# Patient Record
Sex: MALE | Race: WHITE | Employment: OTHER | URBAN - METROPOLITAN AREA
[De-identification: names, ages, dates, MRNs, and addresses within clinical notes are randomized per-mention and may not be internally consistent; named-entity substitution may affect disease eponyms.]

---

## 2017-01-04 DIAGNOSIS — E66.09 NON MORBID OBESITY DUE TO EXCESS CALORIES: ICD-10-CM

## 2017-01-04 DIAGNOSIS — E11.9 TYPE 2 DIABETES MELLITUS, CONTROLLED (HCC): ICD-10-CM

## 2017-01-04 DIAGNOSIS — E03.9 HYPOTHYROIDISM, UNSPECIFIED TYPE: ICD-10-CM

## 2017-01-04 DIAGNOSIS — Z00.00 ENCOUNTER FOR ANNUAL HEALTH EXAMINATION: ICD-10-CM

## 2017-01-04 DIAGNOSIS — I48.20 CHRONIC ATRIAL FIBRILLATION (HCC): ICD-10-CM

## 2017-01-04 DIAGNOSIS — G57.01 PIRIFORMIS SYNDROME OF RIGHT SIDE: ICD-10-CM

## 2017-01-04 DIAGNOSIS — K21.00 REFLUX ESOPHAGITIS: ICD-10-CM

## 2017-01-04 DIAGNOSIS — N40.1 BENIGN PROSTATIC HYPERPLASIA WITH LOWER URINARY TRACT SYMPTOMS, UNSPECIFIED MORPHOLOGY: ICD-10-CM

## 2017-01-04 DIAGNOSIS — Z13.31 DEPRESSION SCREENING: ICD-10-CM

## 2017-01-04 DIAGNOSIS — E78.2 MIXED HYPERLIPIDEMIA: ICD-10-CM

## 2017-01-04 DIAGNOSIS — I10 ESSENTIAL HYPERTENSION: ICD-10-CM

## 2017-01-05 RX ORDER — FINASTERIDE 5 MG/1
5 TABLET, FILM COATED ORAL
Qty: 90 TABLET | Refills: 0 | Status: SHIPPED | OUTPATIENT
Start: 2017-01-05 | End: 2017-03-28

## 2017-01-05 NOTE — TELEPHONE ENCOUNTER
From: Kenneth Kumar  To: Martina Bose MD  Sent: 1/4/2017 6:47 PM CST  Subject: Medication Renewal Request    Original authorizing provider: MD Kenneth Lara would like a refill of the following medications:  finasteride 5 MG Oral Tab [

## 2017-01-09 DIAGNOSIS — E78.2 MIXED HYPERLIPIDEMIA: ICD-10-CM

## 2017-01-09 DIAGNOSIS — I10 ESSENTIAL HYPERTENSION: ICD-10-CM

## 2017-01-09 DIAGNOSIS — E03.9 HYPOTHYROIDISM, UNSPECIFIED TYPE: Primary | ICD-10-CM

## 2017-01-09 DIAGNOSIS — E11.9 CONTROLLED TYPE 2 DIABETES MELLITUS WITHOUT COMPLICATION, WITHOUT LONG-TERM CURRENT USE OF INSULIN (HCC): ICD-10-CM

## 2017-01-11 RX ORDER — LEVOTHYROXINE SODIUM 0.07 MG/1
75 TABLET ORAL
Qty: 90 TABLET | Refills: 1 | Status: SHIPPED | OUTPATIENT
Start: 2017-01-11 | End: 2017-07-22

## 2017-01-11 RX ORDER — LOSARTAN POTASSIUM AND HYDROCHLOROTHIAZIDE 25; 100 MG/1; MG/1
1 TABLET ORAL DAILY
Qty: 90 TABLET | Refills: 1 | Status: SHIPPED | OUTPATIENT
Start: 2017-01-11 | End: 2017-07-19

## 2017-01-11 NOTE — TELEPHONE ENCOUNTER
From: Miladis Espinal  To: Brandon Riley MD  Sent: 1/9/2017 2:48 PM CST  Subject: Medication Renewal Request    Original authorizing provider: MD Miladis Day would like a refill of the following medications:  Levothyroxine Sodium 75 MCG

## 2017-01-12 NOTE — TELEPHONE ENCOUNTER
From: Josue Cisneros  To: Aggie Freitas MD  Sent: 1/12/2017 12:25 PM CST  Subject: Medication Renewal Request    Original authorizing provider: MD Josue Roblero would like a refill of the following medications:  TraMADol HCl 50 MG Oral T

## 2017-01-13 RX ORDER — TRAMADOL HYDROCHLORIDE 50 MG/1
50 TABLET ORAL 2 TIMES DAILY PRN
Qty: 30 TABLET | Refills: 5 | Status: SHIPPED | OUTPATIENT
Start: 2017-01-13 | End: 2017-01-16

## 2017-02-20 ENCOUNTER — APPOINTMENT (OUTPATIENT)
Dept: LAB | Age: 82
End: 2017-02-20
Attending: INTERNAL MEDICINE
Payer: MEDICARE

## 2017-02-20 DIAGNOSIS — E55.9 VITAMIN D DEFICIENCY: ICD-10-CM

## 2017-02-20 DIAGNOSIS — E03.9 HYPOTHYROIDISM, UNSPECIFIED TYPE: ICD-10-CM

## 2017-02-20 DIAGNOSIS — I10 ESSENTIAL HYPERTENSION: ICD-10-CM

## 2017-02-20 DIAGNOSIS — E11.9 CONTROLLED TYPE 2 DIABETES MELLITUS WITHOUT COMPLICATION, WITHOUT LONG-TERM CURRENT USE OF INSULIN (HCC): ICD-10-CM

## 2017-02-20 DIAGNOSIS — I10 ESSENTIAL HYPERTENSION WITH GOAL BLOOD PRESSURE LESS THAN 140/90: ICD-10-CM

## 2017-02-20 DIAGNOSIS — I48.20 CHRONIC ATRIAL FIBRILLATION (HCC): ICD-10-CM

## 2017-02-20 DIAGNOSIS — E78.2 MIXED HYPERLIPIDEMIA: ICD-10-CM

## 2017-02-20 LAB
ALT SERPL-CCNC: 38 U/L (ref 17–63)
AST SERPL-CCNC: 22 U/L (ref 15–41)
BUN BLD-MCNC: 20 MG/DL (ref 8–20)
CALCIUM BLD-MCNC: 9.8 MG/DL (ref 8.3–10.3)
CHLORIDE: 101 MMOL/L (ref 101–111)
CO2: 33 MMOL/L (ref 22–32)
CREAT BLD-MCNC: 0.89 MG/DL (ref 0.7–1.3)
CREAT UR-SCNC: 52.5 MG/DL
EST. AVERAGE GLUCOSE BLD GHB EST-MCNC: 157 MG/DL (ref 68–126)
GLUCOSE BLD-MCNC: 123 MG/DL (ref 70–99)
HBA1C MFR BLD HPLC: 7.1 % (ref ?–5.7)
MICROALBUMIN UR-MCNC: <0.5 MG/DL
POTASSIUM SERPL-SCNC: 4.1 MMOL/L (ref 3.6–5.1)
SODIUM SERPL-SCNC: 141 MMOL/L (ref 136–144)
TSI SER-ACNC: 3.43 MIU/ML (ref 0.35–5.5)

## 2017-02-20 PROCEDURE — 80048 BASIC METABOLIC PNL TOTAL CA: CPT

## 2017-02-20 PROCEDURE — 84443 ASSAY THYROID STIM HORMONE: CPT

## 2017-02-20 PROCEDURE — 83036 HEMOGLOBIN GLYCOSYLATED A1C: CPT

## 2017-02-20 PROCEDURE — 82043 UR ALBUMIN QUANTITATIVE: CPT

## 2017-02-20 PROCEDURE — 36415 COLL VENOUS BLD VENIPUNCTURE: CPT

## 2017-02-20 PROCEDURE — 82570 ASSAY OF URINE CREATININE: CPT

## 2017-02-20 PROCEDURE — 82306 VITAMIN D 25 HYDROXY: CPT

## 2017-02-20 PROCEDURE — 84460 ALANINE AMINO (ALT) (SGPT): CPT

## 2017-02-20 PROCEDURE — 84450 TRANSFERASE (AST) (SGOT): CPT

## 2017-02-21 LAB — 25-HYDROXYVITAMIN D (TOTAL): 35.5 NG/ML (ref 30–100)

## 2017-02-27 RX ORDER — DOCUSATE SODIUM 100 MG/1
100 CAPSULE, LIQUID FILLED ORAL 2 TIMES DAILY PRN
Qty: 60 CAPSULE | Refills: 1 | Status: SHIPPED | OUTPATIENT
Start: 2017-02-27 | End: 2018-01-15

## 2017-03-06 ENCOUNTER — TELEPHONE (OUTPATIENT)
Dept: INTERNAL MEDICINE CLINIC | Facility: CLINIC | Age: 82
End: 2017-03-06

## 2017-03-27 RX ORDER — GLIPIZIDE 5 MG/1
TABLET ORAL
Qty: 90 TABLET | Refills: 0 | Status: SHIPPED | OUTPATIENT
Start: 2017-03-27 | End: 2017-07-07

## 2017-03-28 DIAGNOSIS — I10 ESSENTIAL HYPERTENSION: ICD-10-CM

## 2017-03-28 DIAGNOSIS — E78.2 MIXED HYPERLIPIDEMIA: ICD-10-CM

## 2017-03-28 DIAGNOSIS — E66.09 NON MORBID OBESITY DUE TO EXCESS CALORIES: ICD-10-CM

## 2017-03-28 DIAGNOSIS — G57.01 PIRIFORMIS SYNDROME OF RIGHT SIDE: ICD-10-CM

## 2017-03-28 DIAGNOSIS — E03.9 HYPOTHYROIDISM, UNSPECIFIED TYPE: ICD-10-CM

## 2017-03-28 DIAGNOSIS — K21.00 REFLUX ESOPHAGITIS: ICD-10-CM

## 2017-03-28 DIAGNOSIS — I48.20 CHRONIC ATRIAL FIBRILLATION (HCC): ICD-10-CM

## 2017-03-28 DIAGNOSIS — Z13.31 DEPRESSION SCREENING: ICD-10-CM

## 2017-03-28 DIAGNOSIS — N40.1 BENIGN PROSTATIC HYPERPLASIA WITH LOWER URINARY TRACT SYMPTOMS, UNSPECIFIED MORPHOLOGY: ICD-10-CM

## 2017-03-28 DIAGNOSIS — Z00.00 ENCOUNTER FOR ANNUAL HEALTH EXAMINATION: ICD-10-CM

## 2017-03-28 DIAGNOSIS — E11.9 TYPE 2 DIABETES MELLITUS, CONTROLLED (HCC): ICD-10-CM

## 2017-03-28 RX ORDER — FINASTERIDE 5 MG/1
5 TABLET, FILM COATED ORAL
Qty: 90 TABLET | Refills: 1 | Status: SHIPPED | OUTPATIENT
Start: 2017-03-28 | End: 2017-09-25

## 2017-03-28 NOTE — TELEPHONE ENCOUNTER
From: Kenneth Kumar  To: Martina Bose MD  Sent: 3/28/2017 12:01 PM CDT  Subject: Medication Renewal Request    Original authorizing provider: MD Kenneth Lara would like a refill of the following medications:  finasteride 5 MG Oral Tab

## 2017-04-24 ENCOUNTER — OFFICE VISIT (OUTPATIENT)
Dept: INTERNAL MEDICINE CLINIC | Facility: CLINIC | Age: 82
End: 2017-04-24

## 2017-04-24 VITALS
SYSTOLIC BLOOD PRESSURE: 152 MMHG | BODY MASS INDEX: 33.45 KG/M2 | DIASTOLIC BLOOD PRESSURE: 70 MMHG | TEMPERATURE: 98 F | RESPIRATION RATE: 13 BRPM | HEART RATE: 60 BPM | HEIGHT: 69.5 IN | WEIGHT: 231 LBS | OXYGEN SATURATION: 94 %

## 2017-04-24 DIAGNOSIS — E78.2 MIXED HYPERLIPIDEMIA: ICD-10-CM

## 2017-04-24 DIAGNOSIS — I48.20 CHRONIC ATRIAL FIBRILLATION (HCC): ICD-10-CM

## 2017-04-24 DIAGNOSIS — I10 ESSENTIAL HYPERTENSION: ICD-10-CM

## 2017-04-24 DIAGNOSIS — G47.33 OSA (OBSTRUCTIVE SLEEP APNEA): ICD-10-CM

## 2017-04-24 DIAGNOSIS — E11.9 CONTROLLED TYPE 2 DIABETES MELLITUS WITHOUT COMPLICATION, WITHOUT LONG-TERM CURRENT USE OF INSULIN (HCC): ICD-10-CM

## 2017-04-24 DIAGNOSIS — Z13.31 DEPRESSION SCREENING: ICD-10-CM

## 2017-04-24 DIAGNOSIS — N40.1 BENIGN PROSTATIC HYPERPLASIA WITH LOWER URINARY TRACT SYMPTOMS, UNSPECIFIED MORPHOLOGY: ICD-10-CM

## 2017-04-24 DIAGNOSIS — M48.061 SPINAL STENOSIS OF LUMBAR REGION: ICD-10-CM

## 2017-04-24 DIAGNOSIS — E66.01 MORBID OBESITY DUE TO EXCESS CALORIES (HCC): ICD-10-CM

## 2017-04-24 DIAGNOSIS — Z00.00 ENCOUNTER FOR ANNUAL HEALTH EXAMINATION: Primary | ICD-10-CM

## 2017-04-24 DIAGNOSIS — K21.00 REFLUX ESOPHAGITIS: ICD-10-CM

## 2017-04-24 DIAGNOSIS — E03.9 HYPOTHYROIDISM, UNSPECIFIED TYPE: ICD-10-CM

## 2017-04-24 PROCEDURE — G0444 DEPRESSION SCREEN ANNUAL: HCPCS | Performed by: INTERNAL MEDICINE

## 2017-04-24 PROCEDURE — G0439 PPPS, SUBSEQ VISIT: HCPCS | Performed by: INTERNAL MEDICINE

## 2017-04-24 PROCEDURE — 99214 OFFICE O/P EST MOD 30 MIN: CPT | Performed by: INTERNAL MEDICINE

## 2017-04-24 NOTE — PROGRESS NOTES
HPI:   Geoff Lorenzo is a 80year old male who presents for a medicare wellness exam and addditional issues noted below. 1. HTN: home SBP are 140-150s over past few months.  He has also gained about 10 lbs but not sure why he gained the weight as no hypertension     Obesity     Spinal stenosis of lumbar region     Encounter for monitoring coumadin therapy     S/P lumbar spinal fusion     Piriformis syndrome of right side     Monitoring for long-term anticoagulant use     BPH (benign prostatic hyperpla (N/A, 4/27/2015); fluor gid & loclzj ndl/cath spi dx/ther njx (N/A, 4/27/2015); drain/inject large joint/bursa (N/A, 4/27/2015); fluoroscopic guidance needle placement (N/A, 4/27/2015); patient withough preoperative order for iv antibiotic surgical site in history includes Cancer in his father and mother. There is no history of Heart Disease or Stroke. SOCIAL HISTORY:   He  reports that he quit smoking about 36 years ago. His smoking use included Cigarettes. He has a 80 pack-year smoking history.  He has ne and the appearance is normal.  Bilateral monofilament/sensation of both feet is normal.  Pulsation pedal pulse exam of both lower legs/feet is normal as well.          Visual Acuity  Right Eye Visual Acuity: Corrected Right Eye Chart Acuity: 20/40   Left Ey Fair    How do you maintain positive mental well-being?: Social Interaction; Visiting Friends; Visiting Family    If you are a male age 38-65 or a female age 47-67, do you take aspirin?: No    Have you had any immunizations at another office such as Josep Stephenson progress note to see your input here.   Cognitive Assessment     What day of the week is this?: Correct    What month is it?: Correct    What year is it?: Correct    Recall \"Ball\": Correct    Recall \"Flag\": Correct    Recall \"Tree\": Correct       This or any previous visit. Tetanus No orders found for this or any previous visit.          SPECIFIC DISEASE MONITORING Internal Lab or Procedure External Lab or Procedure   Annual Monitoring of Persistent     Medications (ACE/ARB, digoxin diuretics, antico 2/2017 but his home FBS are slightly high on glipizide 5 mg once daily, metformin 500 BID. Repeat A1C in May and further titration if needed. Discussed importance of weight loss.    - no diabetic retinopathy b/l by Dr. En Lima on 3/6/2017  - Foot Exam: 4/

## 2017-04-24 NOTE — PATIENT INSTRUCTIONS
Please consider further memory evaluation through neurology and then likely neuro-psych testing. Please bring in a copy of your healthcare living will and medical power of .      If you do not hear from me in 1 week regarding your blood pressure, clutter. ? Make sure carpets and area rugs have skid proof backing. ? Do not use slippery wax on bare floors. ? Keep furniture in its accustomed place. ? If you have pets, be careful that you don’t trip over them. OUTSIDE SAFETY TIPS  ?  Always wear

## 2017-05-08 RX ORDER — OMEPRAZOLE 20 MG/1
20 CAPSULE, DELAYED RELEASE ORAL EVERY MORNING
Qty: 90 CAPSULE | Refills: 1 | Status: SHIPPED | OUTPATIENT
Start: 2017-05-08 | End: 2017-08-14

## 2017-05-08 RX ORDER — TRAMADOL HYDROCHLORIDE 50 MG/1
50 TABLET ORAL 2 TIMES DAILY PRN
Qty: 90 TABLET | Refills: 1 | Status: SHIPPED | OUTPATIENT
Start: 2017-05-08 | End: 2017-08-14

## 2017-05-08 NOTE — TELEPHONE ENCOUNTER
From: Jian Tan  To: Megan Aleman MD  Sent: 5/5/2017 10:22 PM CDT  Subject: Medication Renewal Request    Original authorizing provider: MD Jian Medina would like a refill of the following medications:  omeprazole 20 MG Oral Caps

## 2017-05-09 DIAGNOSIS — M48.00 SPINAL STENOSIS, UNSPECIFIED SPINAL REGION: Primary | ICD-10-CM

## 2017-05-09 RX ORDER — CELECOXIB 200 MG/1
200 CAPSULE ORAL DAILY
Qty: 90 CAPSULE | Refills: 1 | Status: SHIPPED | COMMUNITY
Start: 2017-05-09 | End: 2017-10-26 | Stop reason: ALTCHOICE

## 2017-05-22 RX ORDER — PRAVASTATIN SODIUM 40 MG
TABLET ORAL
Qty: 90 TABLET | Refills: 3 | Status: SHIPPED | OUTPATIENT
Start: 2017-05-22 | End: 2018-02-08

## 2017-06-16 ENCOUNTER — APPOINTMENT (OUTPATIENT)
Dept: LAB | Age: 82
End: 2017-06-16
Attending: INTERNAL MEDICINE
Payer: MEDICARE

## 2017-06-16 DIAGNOSIS — E66.9 OBESITY (BMI 30-39.9): ICD-10-CM

## 2017-06-16 DIAGNOSIS — Z13.31 DEPRESSION SCREENING: ICD-10-CM

## 2017-06-16 DIAGNOSIS — I10 ESSENTIAL HYPERTENSION: ICD-10-CM

## 2017-06-16 DIAGNOSIS — G47.33 OSA (OBSTRUCTIVE SLEEP APNEA): ICD-10-CM

## 2017-06-16 DIAGNOSIS — E03.9 HYPOTHYROIDISM, UNSPECIFIED TYPE: ICD-10-CM

## 2017-06-16 DIAGNOSIS — E11.9 CONTROLLED TYPE 2 DIABETES MELLITUS WITHOUT COMPLICATION, WITHOUT LONG-TERM CURRENT USE OF INSULIN (HCC): ICD-10-CM

## 2017-06-16 DIAGNOSIS — I48.20 CHRONIC ATRIAL FIBRILLATION (HCC): ICD-10-CM

## 2017-06-16 DIAGNOSIS — E78.2 MIXED HYPERLIPIDEMIA: ICD-10-CM

## 2017-06-16 DIAGNOSIS — Z00.00 ENCOUNTER FOR ANNUAL HEALTH EXAMINATION: ICD-10-CM

## 2017-06-16 PROCEDURE — 83036 HEMOGLOBIN GLYCOSYLATED A1C: CPT

## 2017-06-16 PROCEDURE — 80053 COMPREHEN METABOLIC PANEL: CPT

## 2017-06-16 PROCEDURE — 36415 COLL VENOUS BLD VENIPUNCTURE: CPT

## 2017-06-20 PROBLEM — E87.6 HYPOKALEMIA: Status: ACTIVE | Noted: 2017-06-20

## 2017-07-10 RX ORDER — GLIPIZIDE 5 MG/1
TABLET ORAL
Qty: 90 TABLET | Refills: 3 | Status: SHIPPED | OUTPATIENT
Start: 2017-07-10 | End: 2018-01-13

## 2017-07-19 ENCOUNTER — TELEPHONE (OUTPATIENT)
Dept: INTERNAL MEDICINE CLINIC | Facility: CLINIC | Age: 82
End: 2017-07-19

## 2017-07-19 DIAGNOSIS — E11.9 CONTROLLED TYPE 2 DIABETES MELLITUS WITHOUT COMPLICATION, WITHOUT LONG-TERM CURRENT USE OF INSULIN (HCC): ICD-10-CM

## 2017-07-19 DIAGNOSIS — E78.2 MIXED HYPERLIPIDEMIA: ICD-10-CM

## 2017-07-19 DIAGNOSIS — I10 ESSENTIAL HYPERTENSION: ICD-10-CM

## 2017-07-19 DIAGNOSIS — E03.9 HYPOTHYROIDISM, UNSPECIFIED TYPE: ICD-10-CM

## 2017-07-19 NOTE — TELEPHONE ENCOUNTER
Pt called in stating that he started potassium chloride and since then has been experiencing constipation. Thins it could be d/t medication. Informed that potassium chloride can actually cause diarrhea not constipation.  Advised to increased water, fiber

## 2017-07-21 RX ORDER — LOSARTAN POTASSIUM AND HYDROCHLOROTHIAZIDE 25; 100 MG/1; MG/1
1 TABLET ORAL DAILY
Qty: 90 TABLET | Refills: 0 | Status: SHIPPED | OUTPATIENT
Start: 2017-07-21 | End: 2017-11-14

## 2017-07-22 DIAGNOSIS — I10 ESSENTIAL HYPERTENSION: ICD-10-CM

## 2017-07-22 DIAGNOSIS — E03.9 HYPOTHYROIDISM, UNSPECIFIED TYPE: ICD-10-CM

## 2017-07-22 DIAGNOSIS — E11.9 CONTROLLED TYPE 2 DIABETES MELLITUS WITHOUT COMPLICATION, WITHOUT LONG-TERM CURRENT USE OF INSULIN (HCC): ICD-10-CM

## 2017-07-22 DIAGNOSIS — E78.2 MIXED HYPERLIPIDEMIA: ICD-10-CM

## 2017-07-24 RX ORDER — LEVOTHYROXINE SODIUM 0.07 MG/1
75 TABLET ORAL
Qty: 90 TABLET | Refills: 1 | Status: SHIPPED
Start: 2017-07-24 | End: 2018-01-16

## 2017-07-24 NOTE — TELEPHONE ENCOUNTER
From: Geoff Lorenzo  Sent: 7/22/2017 10:48 AM CDT  Subject: Medication Renewal Request    PEDRO Wells would like a refill of the following medications:  Levothyroxine Sodium 75 MCG Oral Tab Rudolph Baumgarten, MD]    Preferred pharmacy: [place] [address]

## 2017-08-03 PROBLEM — K59.00 CONSTIPATION: Status: ACTIVE | Noted: 2017-08-03

## 2017-08-14 NOTE — TELEPHONE ENCOUNTER
From: Barrera Roth  Sent: 8/14/2017 11:42 AM CDT  Subject: Medication Renewal Request    PEDRO Gutierrez would like a refill of the following medications:  TraMADol HCl 50 MG Oral Tab Karishma Reyes MD]    Preferred pharmacy: [place] [id number] -

## 2017-08-14 NOTE — TELEPHONE ENCOUNTER
From: Josue Cisneros  Sent: 8/14/2017 11:38 AM CDT  Subject: Medication Renewal Request    PEDRO Jimenez would like a refill of the following medications:  omeprazole 20 MG Oral Capsule Delayed Release Sylvia Martinez MD]    Preferred pharmacy: [facility]

## 2017-08-15 RX ORDER — TRAMADOL HYDROCHLORIDE 50 MG/1
50 TABLET ORAL 2 TIMES DAILY PRN
Qty: 90 TABLET | Refills: 1
Start: 2017-08-15 | End: 2017-09-25

## 2017-08-16 RX ORDER — TRAMADOL HYDROCHLORIDE 50 MG/1
50 TABLET ORAL 2 TIMES DAILY PRN
Qty: 90 TABLET | Refills: 1
Start: 2017-08-16

## 2017-08-16 NOTE — TELEPHONE ENCOUNTER
From: Danni Quijano  Sent: 8/15/2017 2:57 PM CDT  Subject: Medication Renewal Request    PEDRO Melitonlisa Nievess would like a refill of the following medications:  TraMADol HCl 50 MG Oral Tab Fina Leon MD]    Preferred pharmacy: [place] [id number] -

## 2017-08-19 RX ORDER — OMEPRAZOLE 20 MG/1
20 CAPSULE, DELAYED RELEASE ORAL EVERY MORNING
Qty: 90 CAPSULE | Refills: 1 | Status: SHIPPED
Start: 2017-08-19 | End: 2017-11-20

## 2017-09-21 ENCOUNTER — TELEPHONE (OUTPATIENT)
Dept: INTERNAL MEDICINE CLINIC | Facility: CLINIC | Age: 82
End: 2017-09-21

## 2017-09-21 NOTE — TELEPHONE ENCOUNTER
We do not carry the high dose flu vaccine in our clinic. He needs to get the high dose one since he is older than 72. He can get this from any other pharmacy and some [facility] also carry the high dose flu shot.

## 2017-09-25 DIAGNOSIS — K21.00 REFLUX ESOPHAGITIS: ICD-10-CM

## 2017-09-25 DIAGNOSIS — E11.9 TYPE 2 DIABETES MELLITUS, CONTROLLED (HCC): ICD-10-CM

## 2017-09-25 DIAGNOSIS — E78.2 MIXED HYPERLIPIDEMIA: ICD-10-CM

## 2017-09-25 DIAGNOSIS — E66.09 NON MORBID OBESITY DUE TO EXCESS CALORIES: ICD-10-CM

## 2017-09-25 DIAGNOSIS — I48.20 CHRONIC ATRIAL FIBRILLATION (HCC): ICD-10-CM

## 2017-09-25 DIAGNOSIS — Z00.00 ENCOUNTER FOR ANNUAL HEALTH EXAMINATION: ICD-10-CM

## 2017-09-25 DIAGNOSIS — G57.01 PIRIFORMIS SYNDROME OF RIGHT SIDE: ICD-10-CM

## 2017-09-25 DIAGNOSIS — Z13.31 DEPRESSION SCREENING: ICD-10-CM

## 2017-09-25 DIAGNOSIS — E03.9 HYPOTHYROIDISM, UNSPECIFIED TYPE: ICD-10-CM

## 2017-09-25 DIAGNOSIS — I10 ESSENTIAL HYPERTENSION: ICD-10-CM

## 2017-09-25 NOTE — TELEPHONE ENCOUNTER
From: Geoff Lorenzo  Sent: 9/25/2017 3:08 PM CDT  Subject: Medication Renewal Request    PEDRO Wells would like a refill of the following medications:     finasteride 5 MG Oral Tab Sue Pedersen MD]     TraMADol HCl 50 MG Oral Tab Sue Pedersen MD]

## 2017-09-26 RX ORDER — FINASTERIDE 5 MG/1
5 TABLET, FILM COATED ORAL
Qty: 90 TABLET | Refills: 1 | Status: SHIPPED
Start: 2017-09-26 | End: 2018-01-13

## 2017-09-27 RX ORDER — TRAMADOL HYDROCHLORIDE 50 MG/1
50 TABLET ORAL 2 TIMES DAILY PRN
Qty: 90 TABLET | Refills: 1 | Status: SHIPPED | OUTPATIENT
Start: 2017-09-27 | End: 2017-11-14

## 2017-09-27 RX ORDER — TRAMADOL HYDROCHLORIDE 50 MG/1
50 TABLET ORAL 2 TIMES DAILY PRN
Qty: 90 TABLET | Refills: 1
Start: 2017-09-27 | End: 2017-09-27

## 2017-10-29 ENCOUNTER — PATIENT MESSAGE (OUTPATIENT)
Dept: INTERNAL MEDICINE CLINIC | Facility: CLINIC | Age: 82
End: 2017-10-29

## 2017-10-30 NOTE — TELEPHONE ENCOUNTER
From: Yesenia Levy  To: Jeremy Lugo MD  Sent: 10/29/2017 11:25 AM CDT  Subject: Non-Urgent Medical Question    My Chart. Giovany Goins FIT test overdue. .I don't remember discussing this. Giovany Goins Do we need a face to face discussion?

## 2017-11-14 DIAGNOSIS — E03.9 HYPOTHYROIDISM, UNSPECIFIED TYPE: ICD-10-CM

## 2017-11-14 DIAGNOSIS — I10 ESSENTIAL HYPERTENSION: ICD-10-CM

## 2017-11-14 DIAGNOSIS — E78.2 MIXED HYPERLIPIDEMIA: ICD-10-CM

## 2017-11-14 DIAGNOSIS — E11.9 CONTROLLED TYPE 2 DIABETES MELLITUS WITHOUT COMPLICATION, WITHOUT LONG-TERM CURRENT USE OF INSULIN (HCC): ICD-10-CM

## 2017-11-15 RX ORDER — LOSARTAN POTASSIUM AND HYDROCHLOROTHIAZIDE 25; 100 MG/1; MG/1
1 TABLET ORAL DAILY
Qty: 90 TABLET | Refills: 0
Start: 2017-11-15 | End: 2017-11-16

## 2017-11-15 RX ORDER — TRAMADOL HYDROCHLORIDE 50 MG/1
50 TABLET ORAL 2 TIMES DAILY PRN
Qty: 45 TABLET | Refills: 0
Start: 2017-11-15 | End: 2017-12-03

## 2017-11-15 NOTE — TELEPHONE ENCOUNTER
From: Nadya Luna  Sent: 11/14/2017 10:06 PM CST  Subject: Medication Renewal Request    PEDRO Graham would like a refill of the following medications:     LOSARTAN POTASSIUM-HCTZ 100-25 MG Oral Tab Ruperto Zarco MD]   Patient Comment: 90 day supply

## 2017-11-16 ENCOUNTER — TELEPHONE (OUTPATIENT)
Dept: INTERNAL MEDICINE CLINIC | Facility: CLINIC | Age: 82
End: 2017-11-16

## 2017-11-16 DIAGNOSIS — E11.9 CONTROLLED TYPE 2 DIABETES MELLITUS WITHOUT COMPLICATION, WITHOUT LONG-TERM CURRENT USE OF INSULIN (HCC): ICD-10-CM

## 2017-11-16 DIAGNOSIS — I10 ESSENTIAL HYPERTENSION: ICD-10-CM

## 2017-11-16 DIAGNOSIS — E78.2 MIXED HYPERLIPIDEMIA: ICD-10-CM

## 2017-11-16 DIAGNOSIS — E03.9 HYPOTHYROIDISM, UNSPECIFIED TYPE: ICD-10-CM

## 2017-11-16 RX ORDER — LOSARTAN POTASSIUM AND HYDROCHLOROTHIAZIDE 25; 100 MG/1; MG/1
1 TABLET ORAL DAILY
Qty: 90 TABLET | Refills: 0 | Status: SHIPPED | OUTPATIENT
Start: 2017-11-16 | End: 2018-02-08

## 2017-11-22 RX ORDER — OMEPRAZOLE 20 MG/1
20 CAPSULE, DELAYED RELEASE ORAL EVERY MORNING
Qty: 90 CAPSULE | Refills: 1 | Status: SHIPPED
Start: 2017-11-22 | End: 2018-01-10

## 2017-11-22 NOTE — TELEPHONE ENCOUNTER
From: Dillon Maldonado  Sent: 11/20/2017 10:10 PM CST  Subject: Medication Renewal Request    PEDRO Benson would like a refill of the following medications:     omeprazole 20 MG Oral Capsule Delayed Release Davis Edgar MD]   Patient Comment: 90 daysupp

## 2017-12-05 RX ORDER — TRAMADOL HYDROCHLORIDE 50 MG/1
50 TABLET ORAL 2 TIMES DAILY PRN
Qty: 180 TABLET | Refills: 0
Start: 2017-12-05 | End: 2017-12-05

## 2017-12-05 RX ORDER — TRAMADOL HYDROCHLORIDE 50 MG/1
50 TABLET ORAL 2 TIMES DAILY PRN
Qty: 180 TABLET | Refills: 0 | Status: SHIPPED | OUTPATIENT
Start: 2017-12-05 | End: 2018-03-04

## 2017-12-05 NOTE — TELEPHONE ENCOUNTER
Requesting Tramadol  LOV: 04/24/17  RTC: 04/24/2018  Last Relevant Labs: 2/20/17  Filled: 11/15/17 #45 with 0 refills    Future Appointments  Date Time Provider Pam Mandujano   5/17/2018 8:30 AM [facility]

## 2017-12-05 NOTE — TELEPHONE ENCOUNTER
From: Gianna Case  Sent: 12/3/2017 9:58 PM CST  Subject: Medication Renewal Request    PEDRO Calloway would like a refill of the following medications:     TraMADol HCl 50 MG Oral Tab Boone South MD]   Patient Comment: Itake 2 pr day Qty45 MAKES NO

## 2017-12-06 ENCOUNTER — TELEPHONE (OUTPATIENT)
Dept: INTERNAL MEDICINE CLINIC | Facility: CLINIC | Age: 82
End: 2017-12-06

## 2017-12-06 NOTE — TELEPHONE ENCOUNTER
Patient left message for triage nurse, returned call but no answer or voice mail.  Tramadol script faxed to [facility] @ 11:22 am

## 2018-01-04 ENCOUNTER — OFFICE VISIT (OUTPATIENT)
Dept: SLEEP CENTER | Facility: HOSPITAL | Age: 83
End: 2018-01-04
Attending: INTERNAL MEDICINE
Payer: MEDICARE

## 2018-01-04 PROCEDURE — 95806 SLEEP STUDY UNATT&RESP EFFT: CPT

## 2018-01-05 ENCOUNTER — LAB ENCOUNTER (OUTPATIENT)
Dept: LAB | Age: 83
End: 2018-01-05
Attending: INTERNAL MEDICINE
Payer: MEDICARE

## 2018-01-05 DIAGNOSIS — E03.9 HYPOTHYROIDISM, UNSPECIFIED TYPE: ICD-10-CM

## 2018-01-05 DIAGNOSIS — I48.91 ATRIAL FIBRILLATION, UNSPECIFIED TYPE (HCC): ICD-10-CM

## 2018-01-05 DIAGNOSIS — E87.6 HYPOKALEMIA: ICD-10-CM

## 2018-01-05 DIAGNOSIS — E11.9 CONTROLLED TYPE 2 DIABETES MELLITUS WITHOUT COMPLICATION, WITHOUT LONG-TERM CURRENT USE OF INSULIN (HCC): ICD-10-CM

## 2018-01-05 DIAGNOSIS — E78.2 MIXED HYPERLIPIDEMIA: ICD-10-CM

## 2018-01-05 DIAGNOSIS — I10 ESSENTIAL HYPERTENSION: ICD-10-CM

## 2018-01-05 LAB
ALBUMIN SERPL-MCNC: 3.7 G/DL (ref 3.5–4.8)
ALP LIVER SERPL-CCNC: 71 U/L (ref 45–117)
ALT SERPL-CCNC: 36 U/L (ref 17–63)
AST SERPL-CCNC: 24 U/L (ref 15–41)
BILIRUB SERPL-MCNC: 0.4 MG/DL (ref 0.1–2)
BUN BLD-MCNC: 17 MG/DL (ref 8–20)
CALCIUM BLD-MCNC: 9.5 MG/DL (ref 8.3–10.3)
CHLORIDE: 103 MMOL/L (ref 101–111)
CHOLEST SMN-MCNC: 195 MG/DL (ref ?–200)
CO2: 29 MMOL/L (ref 22–32)
CREAT BLD-MCNC: 0.88 MG/DL (ref 0.7–1.3)
CREAT UR-SCNC: 91.6 MG/DL
EST. AVERAGE GLUCOSE BLD GHB EST-MCNC: 143 MG/DL (ref 68–126)
FREE T4: 1 NG/DL (ref 0.9–1.8)
GLUCOSE BLD-MCNC: 131 MG/DL (ref 70–99)
HBA1C MFR BLD HPLC: 6.6 % (ref ?–5.7)
HDLC SERPL-MCNC: 45 MG/DL (ref 45–?)
HDLC SERPL: 4.33 {RATIO} (ref ?–4.97)
LDLC SERPL CALC-MCNC: 104 MG/DL (ref ?–130)
M PROTEIN MFR SERPL ELPH: 7.1 G/DL (ref 6.1–8.3)
MICROALBUMIN UR-MCNC: 0.86 MG/DL
MICROALBUMIN/CREAT 24H UR-RTO: 9.4 UG/MG (ref ?–30)
NONHDLC SERPL-MCNC: 150 MG/DL (ref ?–130)
POTASSIUM SERPL-SCNC: 4.1 MMOL/L (ref 3.6–5.1)
SODIUM SERPL-SCNC: 139 MMOL/L (ref 136–144)
TRIGL SERPL-MCNC: 230 MG/DL (ref ?–150)
TSI SER-ACNC: 5.79 MIU/ML (ref 0.35–5.5)
VLDLC SERPL CALC-MCNC: 46 MG/DL (ref 5–40)

## 2018-01-05 PROCEDURE — 80053 COMPREHEN METABOLIC PANEL: CPT

## 2018-01-05 PROCEDURE — 83036 HEMOGLOBIN GLYCOSYLATED A1C: CPT

## 2018-01-05 PROCEDURE — 84443 ASSAY THYROID STIM HORMONE: CPT

## 2018-01-05 PROCEDURE — 82570 ASSAY OF URINE CREATININE: CPT

## 2018-01-05 PROCEDURE — 84439 ASSAY OF FREE THYROXINE: CPT

## 2018-01-05 PROCEDURE — 80061 LIPID PANEL: CPT

## 2018-01-05 PROCEDURE — 36415 COLL VENOUS BLD VENIPUNCTURE: CPT

## 2018-01-05 PROCEDURE — 82043 UR ALBUMIN QUANTITATIVE: CPT

## 2018-01-10 ENCOUNTER — LAB ENCOUNTER (OUTPATIENT)
Dept: LAB | Age: 83
End: 2018-01-10
Attending: INTERNAL MEDICINE
Payer: MEDICARE

## 2018-01-10 ENCOUNTER — OFFICE VISIT (OUTPATIENT)
Dept: INTERNAL MEDICINE CLINIC | Facility: CLINIC | Age: 83
End: 2018-01-10

## 2018-01-10 VITALS
RESPIRATION RATE: 16 BRPM | WEIGHT: 216.5 LBS | DIASTOLIC BLOOD PRESSURE: 72 MMHG | BODY MASS INDEX: 31.34 KG/M2 | SYSTOLIC BLOOD PRESSURE: 132 MMHG | TEMPERATURE: 99 F | OXYGEN SATURATION: 94 % | HEIGHT: 69.5 IN | HEART RATE: 80 BPM

## 2018-01-10 DIAGNOSIS — K21.00 REFLUX ESOPHAGITIS: ICD-10-CM

## 2018-01-10 DIAGNOSIS — K59.03 DRUG-INDUCED CONSTIPATION: ICD-10-CM

## 2018-01-10 DIAGNOSIS — R26.81 GAIT INSTABILITY: ICD-10-CM

## 2018-01-10 DIAGNOSIS — I10 ESSENTIAL HYPERTENSION: ICD-10-CM

## 2018-01-10 DIAGNOSIS — E78.2 MIXED HYPERLIPIDEMIA: ICD-10-CM

## 2018-01-10 DIAGNOSIS — I48.91 ATRIAL FIBRILLATION, UNSPECIFIED TYPE (HCC): Primary | ICD-10-CM

## 2018-01-10 DIAGNOSIS — E03.9 HYPOTHYROIDISM, UNSPECIFIED TYPE: ICD-10-CM

## 2018-01-10 DIAGNOSIS — E11.9 CONTROLLED TYPE 2 DIABETES MELLITUS WITHOUT COMPLICATION, WITHOUT LONG-TERM CURRENT USE OF INSULIN (HCC): ICD-10-CM

## 2018-01-10 DIAGNOSIS — R29.898 WEAKNESS OF BOTH LOWER EXTREMITIES: ICD-10-CM

## 2018-01-10 DIAGNOSIS — I48.91 ATRIAL FIBRILLATION, UNSPECIFIED TYPE (HCC): ICD-10-CM

## 2018-01-10 LAB
FOLATE (FOLIC ACID), SERUM: 36.3 NG/ML (ref 8.7–24)
HAV AB SERPL IA-ACNC: 648 PG/ML (ref 193–986)

## 2018-01-10 PROCEDURE — 99214 OFFICE O/P EST MOD 30 MIN: CPT | Performed by: INTERNAL MEDICINE

## 2018-01-10 PROCEDURE — 82746 ASSAY OF FOLIC ACID SERUM: CPT

## 2018-01-10 PROCEDURE — 82607 VITAMIN B-12: CPT

## 2018-01-10 PROCEDURE — 36415 COLL VENOUS BLD VENIPUNCTURE: CPT

## 2018-01-10 RX ORDER — POTASSIUM CHLORIDE 750 MG/1
10 TABLET, FILM COATED, EXTENDED RELEASE ORAL DAILY
Qty: 90 TABLET | Refills: 0 | COMMUNITY
Start: 2018-01-10 | End: 2018-02-08

## 2018-01-10 RX ORDER — RANITIDINE 150 MG/1
150 TABLET ORAL EVERY MORNING
Qty: 90 TABLET | Refills: 1 | Status: SHIPPED | OUTPATIENT
Start: 2018-01-10 | End: 2018-05-01

## 2018-01-10 NOTE — PATIENT INSTRUCTIONS
For your heartburn, please HOLD the omeprazole.  If you develop symptoms of heartburn off the omeprazole, please try ranitidine (zantac) 150 mg 30 minutes prior to breakfast. If this is not effective you CAN take ranitidine (zantac) 150 mg 30 minutes prior

## 2018-01-10 NOTE — PROGRESS NOTES
Mikey Sanchez is a 80year old male. HPI:   Patient presents for the following issues. 1. HTN: home pressures are showing systolics in 963O but here, they are much better. 2. DM: home FBS average is 139. No hypoglycemia.   3. Afib: doing well on war denies edema     Wt Readings from Last 6 Encounters:  01/10/18 : 216 lb 8 oz  11/17/17 : 212 lb  10/26/17 : 211 lb  08/03/17 : 220 lb  06/07/17 : 221 lb  05/16/17 : 227 lb        Hydralazine             Other (See Comments)    Comment:Constipation  Lovasta LOCLZJ NDL/CATH SPI DX/THER UFR N/A      Comment: Procedure: LUMBAR EPIDURAL;  Surgeon:                Candice Harvey MD;  Location: [place] [address] MANAGEMENT  4/7/2015: Riana HERRING & Devoria Talbot NDL/CATH SPI DX/THER GLV N/A      Comment: Pr Right      Comment: Procedure: PIRIFORMIS/SCIATIC NERVE INJECTION;               Surgeon: Vito Lovelace MD;  Location: [facility] [address] FOR PAIN MANAGEMENT  10/2003: Alisa Carter      Comment: L3-L4  4/2013: OTHER SURGICAL HISTORY      C Paola Garrison MD;                 Location: [address] [place] [address] [place] MANAGEMENT  6/9/2015: PATIENT Berthoud Miguel A PREOPERATIVE ORDER FOR IV ANT* Left      Comment: Procedure: SI JOINT INJECTION;  Surgeon:                Suellen Lombardo MD;  Location: [address] [place] [address]  barefoot skin diabetic exam is normal, visualized feet and the appearance is normal.  Bilateral monofilament/sensation of both feet is decreased over pads of R toe only. Sensation is normal otherwise. No wounds or ulcers.    Pulsation pedal pulse exam of clotilde GERD: stopping omeprazole, dietary discretion discussed, and will try zantac if sx recur. # Health Maintenance: medicare wellness exam 4/24/2017  Colon Cancer Screening: colonoscopy in 3/2014 with hyperplastic and adenomatous polyps.  He declines repeatin

## 2018-01-12 NOTE — PROCEDURES
[address] [place] [address]       Accredited by the [facility] of [facility] ([facility])    PATIENT'S NAME:        Toi Harry  ATTENDING PHYSICIAN:   Carroll Briggs M.D.   REFERRING PHYSICIAN:   Carroll Briggs M.D.  PA overnight oximetry test on room air as this oximetry suggested potential hypoxemia, but we should reconfirm that with a repeat overnight oximetry.     Dictated By Olaf Murphy M.D.  d: 01/11/2018 16:26:42  t: 01/11/2018 18:02:56  Job [id number]  RN/

## 2018-01-13 DIAGNOSIS — E11.9 TYPE 2 DIABETES MELLITUS, CONTROLLED (HCC): ICD-10-CM

## 2018-01-13 DIAGNOSIS — E78.2 MIXED HYPERLIPIDEMIA: ICD-10-CM

## 2018-01-13 DIAGNOSIS — I10 ESSENTIAL HYPERTENSION: ICD-10-CM

## 2018-01-13 DIAGNOSIS — E03.9 HYPOTHYROIDISM, UNSPECIFIED TYPE: ICD-10-CM

## 2018-01-13 DIAGNOSIS — E11.9 CONTROLLED TYPE 2 DIABETES MELLITUS WITHOUT COMPLICATION, WITHOUT LONG-TERM CURRENT USE OF INSULIN (HCC): ICD-10-CM

## 2018-01-13 DIAGNOSIS — Z00.00 ENCOUNTER FOR ANNUAL HEALTH EXAMINATION: ICD-10-CM

## 2018-01-13 DIAGNOSIS — G57.01 PIRIFORMIS SYNDROME OF RIGHT SIDE: ICD-10-CM

## 2018-01-13 DIAGNOSIS — Z13.31 DEPRESSION SCREENING: ICD-10-CM

## 2018-01-13 DIAGNOSIS — K21.00 REFLUX ESOPHAGITIS: ICD-10-CM

## 2018-01-13 DIAGNOSIS — I48.20 CHRONIC ATRIAL FIBRILLATION (HCC): ICD-10-CM

## 2018-01-13 DIAGNOSIS — E66.09 NON MORBID OBESITY DUE TO EXCESS CALORIES: ICD-10-CM

## 2018-01-13 RX ORDER — LEVOTHYROXINE SODIUM 0.07 MG/1
75 TABLET ORAL
Qty: 90 TABLET | Refills: 1 | Status: CANCELLED
Start: 2018-01-13

## 2018-01-13 RX ORDER — DOCUSATE SODIUM 100 MG/1
100 CAPSULE, LIQUID FILLED ORAL 2 TIMES DAILY PRN
Qty: 60 CAPSULE | Refills: 1 | Status: CANCELLED
Start: 2018-01-13

## 2018-01-13 RX ORDER — FINASTERIDE 5 MG/1
5 TABLET, FILM COATED ORAL
Qty: 90 TABLET | Refills: 1
Start: 2018-01-13

## 2018-01-16 DIAGNOSIS — E11.9 CONTROLLED TYPE 2 DIABETES MELLITUS WITHOUT COMPLICATION, WITHOUT LONG-TERM CURRENT USE OF INSULIN (HCC): ICD-10-CM

## 2018-01-16 DIAGNOSIS — E78.2 MIXED HYPERLIPIDEMIA: ICD-10-CM

## 2018-01-16 DIAGNOSIS — E03.9 HYPOTHYROIDISM, UNSPECIFIED TYPE: ICD-10-CM

## 2018-01-16 DIAGNOSIS — I10 ESSENTIAL HYPERTENSION: ICD-10-CM

## 2018-01-16 RX ORDER — LEVOTHYROXINE SODIUM 0.07 MG/1
75 TABLET ORAL
Qty: 90 TABLET | Refills: 1 | OUTPATIENT
Start: 2018-01-16

## 2018-01-16 RX ORDER — LEVOTHYROXINE SODIUM 0.07 MG/1
75 TABLET ORAL
Qty: 90 TABLET | Refills: 0 | Status: SHIPPED
Start: 2018-01-16 | End: 2018-02-08

## 2018-01-16 RX ORDER — FINASTERIDE 5 MG/1
5 TABLET, FILM COATED ORAL
Qty: 90 TABLET | Refills: 1 | Status: SHIPPED
Start: 2018-01-16 | End: 2018-02-08

## 2018-01-16 RX ORDER — GLIPIZIDE 5 MG/1
5 TABLET ORAL
Qty: 90 TABLET | Refills: 3 | Status: SHIPPED
Start: 2018-01-16 | End: 2018-02-08

## 2018-01-16 RX ORDER — DOCUSATE SODIUM 100 MG/1
100 CAPSULE, LIQUID FILLED ORAL 2 TIMES DAILY PRN
Qty: 60 CAPSULE | Refills: 1 | Status: SHIPPED | OUTPATIENT
Start: 2018-01-16 | End: 2018-07-06

## 2018-01-16 NOTE — TELEPHONE ENCOUNTER
From: Nasima Key  Sent: 1/16/2018 4:12 PM CST  Subject: Medication Renewal Request    J Amelia Paula would like a refill of the following medications:     Levothyroxine Sodium 75 MCG Oral Tab Adan Núñez MD]    Preferred pharmacy: [address]

## 2018-01-17 RX ORDER — LEVOTHYROXINE SODIUM 0.07 MG/1
TABLET ORAL
Qty: 90 TABLET | Refills: 0 | OUTPATIENT
Start: 2018-01-17

## 2018-01-23 NOTE — TELEPHONE ENCOUNTER
Patient calling in stated his blood sugar has been high and needs his medication as soon as possible.     Patient is in [place] and will like his medication to be sent to: [facility] [address]

## 2018-02-08 ENCOUNTER — TELEPHONE (OUTPATIENT)
Dept: INTERNAL MEDICINE CLINIC | Facility: CLINIC | Age: 83
End: 2018-02-08

## 2018-02-08 DIAGNOSIS — I48.91 ATRIAL FIBRILLATION, UNSPECIFIED TYPE (HCC): ICD-10-CM

## 2018-02-08 DIAGNOSIS — K59.03 DRUG-INDUCED CONSTIPATION: ICD-10-CM

## 2018-02-08 DIAGNOSIS — Z00.00 ENCOUNTER FOR ANNUAL HEALTH EXAMINATION: ICD-10-CM

## 2018-02-08 DIAGNOSIS — E03.9 HYPOTHYROIDISM, UNSPECIFIED TYPE: ICD-10-CM

## 2018-02-08 DIAGNOSIS — E11.9 CONTROLLED TYPE 2 DIABETES MELLITUS WITHOUT COMPLICATION, WITHOUT LONG-TERM CURRENT USE OF INSULIN (HCC): ICD-10-CM

## 2018-02-08 DIAGNOSIS — G57.01 PIRIFORMIS SYNDROME OF RIGHT SIDE: ICD-10-CM

## 2018-02-08 DIAGNOSIS — K21.00 REFLUX ESOPHAGITIS: ICD-10-CM

## 2018-02-08 DIAGNOSIS — E66.09 NON MORBID OBESITY DUE TO EXCESS CALORIES: ICD-10-CM

## 2018-02-08 DIAGNOSIS — Z13.31 DEPRESSION SCREENING: ICD-10-CM

## 2018-02-08 DIAGNOSIS — R29.898 WEAKNESS OF BOTH LOWER EXTREMITIES: ICD-10-CM

## 2018-02-08 DIAGNOSIS — E78.2 MIXED HYPERLIPIDEMIA: ICD-10-CM

## 2018-02-08 DIAGNOSIS — R26.81 GAIT INSTABILITY: ICD-10-CM

## 2018-02-08 DIAGNOSIS — I10 ESSENTIAL HYPERTENSION: ICD-10-CM

## 2018-02-08 DIAGNOSIS — I48.20 CHRONIC ATRIAL FIBRILLATION (HCC): ICD-10-CM

## 2018-02-08 DIAGNOSIS — E11.9 DIABETES MELLITUS WITHOUT COMPLICATION (HCC): Primary | ICD-10-CM

## 2018-02-08 NOTE — TELEPHONE ENCOUNTER
Patient was calling about RX refill if he gets snowed in from [place] - then pt call dropped /disconnected no further information

## 2018-02-08 NOTE — TELEPHONE ENCOUNTER
Patient in [place] , possibly delay in returning home on Saturday due to weather. Has enough medication until Saturday, he is concerned if he is unable to return, please advise.  Patient instructed if weather delay occurs call derik for a courtesy draw

## 2018-02-08 NOTE — TELEPHONE ENCOUNTER
Patient calling in inquiring about (all)  Medications he is currently taking. Patient is currently in [place] and was wondering if he does not take his meds for a couple days, will there be any health conflicts that occur. Patient will be back in town Saturday. Please call patient to discuss his question/concern.

## 2018-02-08 NOTE — TELEPHONE ENCOUNTER
Please find the pharmacy, pend the medications with the amount they want, and PEND the orders to me for more efficient refills.

## 2018-02-09 DIAGNOSIS — E78.2 MIXED HYPERLIPIDEMIA: ICD-10-CM

## 2018-02-09 DIAGNOSIS — E03.9 HYPOTHYROIDISM, UNSPECIFIED TYPE: ICD-10-CM

## 2018-02-09 DIAGNOSIS — E11.9 CONTROLLED TYPE 2 DIABETES MELLITUS WITHOUT COMPLICATION, WITHOUT LONG-TERM CURRENT USE OF INSULIN (HCC): ICD-10-CM

## 2018-02-09 DIAGNOSIS — I10 ESSENTIAL HYPERTENSION: ICD-10-CM

## 2018-02-09 RX ORDER — LEVOTHYROXINE SODIUM 0.07 MG/1
75 TABLET ORAL
Qty: 5 TABLET | Refills: 0 | Status: SHIPPED | OUTPATIENT
Start: 2018-02-09 | End: 2018-04-27

## 2018-02-09 RX ORDER — PRAVASTATIN SODIUM 40 MG
TABLET ORAL
Qty: 5 TABLET | Refills: 0 | Status: SHIPPED | OUTPATIENT
Start: 2018-02-09 | End: 2018-06-02

## 2018-02-09 RX ORDER — LOSARTAN POTASSIUM AND HYDROCHLOROTHIAZIDE 25; 100 MG/1; MG/1
1 TABLET ORAL DAILY
Qty: 5 TABLET | Refills: 0 | Status: SHIPPED | OUTPATIENT
Start: 2018-02-09 | End: 2018-02-17

## 2018-02-09 RX ORDER — GLIPIZIDE 5 MG/1
5 TABLET ORAL
Qty: 5 TABLET | Refills: 3 | Status: SHIPPED | OUTPATIENT
Start: 2018-02-09 | End: 2018-07-06

## 2018-02-09 RX ORDER — POTASSIUM CHLORIDE 750 MG/1
10 TABLET, FILM COATED, EXTENDED RELEASE ORAL DAILY
Qty: 5 TABLET | Refills: 0 | Status: SHIPPED | OUTPATIENT
Start: 2018-02-09 | End: 2018-05-26

## 2018-02-09 RX ORDER — FINASTERIDE 5 MG/1
5 TABLET, FILM COATED ORAL
Qty: 5 TABLET | Refills: 0 | Status: SHIPPED | OUTPATIENT
Start: 2018-02-09 | End: 2018-05-17

## 2018-02-16 RX ORDER — LEVOTHYROXINE SODIUM 0.07 MG/1
TABLET ORAL
Qty: 90 TABLET | Refills: 0 | OUTPATIENT
Start: 2018-02-16

## 2018-02-16 RX ORDER — LOSARTAN POTASSIUM AND HYDROCHLOROTHIAZIDE 25; 100 MG/1; MG/1
1 TABLET ORAL DAILY
Qty: 90 TABLET | Refills: 0 | OUTPATIENT
Start: 2018-02-16

## 2018-02-16 RX ORDER — POTASSIUM CHLORIDE 750 MG/1
TABLET, FILM COATED, EXTENDED RELEASE ORAL
Qty: 90 TABLET | Refills: 0 | OUTPATIENT
Start: 2018-02-16

## 2018-02-16 RX ORDER — PRAVASTATIN SODIUM 40 MG
TABLET ORAL
Qty: 90 TABLET | Refills: 0 | OUTPATIENT
Start: 2018-02-16

## 2018-02-16 RX ORDER — LOSARTAN POTASSIUM AND HYDROCHLOROTHIAZIDE 25; 100 MG/1; MG/1
TABLET ORAL
Qty: 90 TABLET | Refills: 0 | OUTPATIENT
Start: 2018-02-16

## 2018-02-16 NOTE — TELEPHONE ENCOUNTER
Losartan hctz 100-25 mg 1 tab daily filled     Levothyroxine 75 mcg 1 tab daily filled 2-9-18 5     potassium 10 1 tab daily filled     Pravastatin 40 mg 1 tab daily filled     Per patient not requesting refills at this time.

## 2018-02-17 DIAGNOSIS — I10 ESSENTIAL HYPERTENSION: ICD-10-CM

## 2018-02-19 RX ORDER — LOSARTAN POTASSIUM AND HYDROCHLOROTHIAZIDE 25; 100 MG/1; MG/1
1 TABLET ORAL DAILY
Qty: 90 TABLET | Refills: 0 | Status: SHIPPED
Start: 2018-02-19 | End: 2018-05-14

## 2018-02-19 NOTE — TELEPHONE ENCOUNTER
From: Tushar Covington  Sent: 2/17/2018 2:10 PM CST  Subject: Medication Renewal Request    J Mary Richard would like a refill of the following medications:     Losartan Potassium-HCTZ 100-25 MG Oral Tab Li Leong MD]    Preferred pharmacy: [facility]

## 2018-03-05 ENCOUNTER — PATIENT MESSAGE (OUTPATIENT)
Dept: INTERNAL MEDICINE CLINIC | Facility: CLINIC | Age: 83
End: 2018-03-05

## 2018-03-05 RX ORDER — TRAMADOL HYDROCHLORIDE 50 MG/1
50 TABLET ORAL 2 TIMES DAILY PRN
Qty: 180 TABLET | Refills: 0
Start: 2018-03-05 | End: 2018-06-10

## 2018-03-05 NOTE — TELEPHONE ENCOUNTER
Requesting Tramadol  LOV: 1/10/18  RTC: 5/10/18  Last Relevant Labs: 1/1018  Filled: 12/5/17 #180 with Erna Lobo    Future Appointments  Date Time Provider Pam Mandujano   5/17/2018 8:30 AM Cristino Berry MD [facility]

## 2018-03-05 NOTE — TELEPHONE ENCOUNTER
From: Jared Willett  Sent: 3/4/2018 3:34 PM CST  Subject: Medication Renewal Request    PEDRO Shea would like a refill of the following medications:     TraMADol HCl 50 MG Oral Tab Suzie Pacheco MD]    Preferred pharmacy: [place] [phone]

## 2018-04-21 DIAGNOSIS — E03.9 HYPOTHYROIDISM, UNSPECIFIED TYPE: ICD-10-CM

## 2018-04-21 RX ORDER — LEVOTHYROXINE SODIUM 0.07 MG/1
75 TABLET ORAL
Qty: 5 TABLET | Refills: 0 | Status: CANCELLED
Start: 2018-04-21

## 2018-04-23 NOTE — TELEPHONE ENCOUNTER
Medication(s) to Refill:   Pending Prescriptions Disp Refills    Levothyroxine Sodium 75 MCG Oral Tab 5 tablet 0     Sig: Take 1 tablet (75 mcg total) by mouth before breakfast.             Reason for Medication Refill being sent to Provider / Reason Protocol Failed:  [] 90 day lissy period ended  [] Blood Pressure out of range  [] Labs Abnormal  [] Medication not previously prescribed by Provider  [] Non-Protocol Medication  [] Prescription needs to be printed at site and faxed to pharmacy  [] Controlled Substance - needs to be printed at the site, site to notify patient when ready    Last Time Medication was Filled:  2-9-18      Last Office Visit with PCP: 1/10/2018    When Patient was Due Back to the Office:  5-10-18  (from when PCP last addressed condition)    Future Appointments:  Future Appointments  Date Time Provider Pam Mandujano   5/17/2018 8:30 AM Avinash Mccain MD [facility]         Last Blood Pressures:  BP Readings from Last 2 Encounters:  01/10/18 : 132/72  11/17/17 : 126/68        Recent Labs: 1-5-18

## 2018-04-23 NOTE — TELEPHONE ENCOUNTER
From: Margarito Gould  Sent: 4/21/2018 2:16 PM CDT  Subject: Medication Renewal Request    PEDRO Charles would like a refill of the following medications:     Levothyroxine Sodium 75 MCG Oral Tab Claritza Tello MD]    Preferred pharmacy: [address] [phone], [phone]

## 2018-04-25 ENCOUNTER — APPOINTMENT (OUTPATIENT)
Dept: LAB | Age: 83
End: 2018-04-25
Attending: INTERNAL MEDICINE
Payer: MEDICARE

## 2018-04-25 DIAGNOSIS — I48.91 ATRIAL FIBRILLATION, UNSPECIFIED TYPE (HCC): ICD-10-CM

## 2018-04-25 DIAGNOSIS — E03.9 HYPOTHYROIDISM, UNSPECIFIED TYPE: ICD-10-CM

## 2018-04-25 DIAGNOSIS — I10 ESSENTIAL HYPERTENSION: ICD-10-CM

## 2018-04-25 DIAGNOSIS — R29.898 WEAKNESS OF BOTH LOWER EXTREMITIES: ICD-10-CM

## 2018-04-25 DIAGNOSIS — R26.81 GAIT INSTABILITY: ICD-10-CM

## 2018-04-25 DIAGNOSIS — K59.03 DRUG-INDUCED CONSTIPATION: ICD-10-CM

## 2018-04-25 DIAGNOSIS — E11.9 CONTROLLED TYPE 2 DIABETES MELLITUS WITHOUT COMPLICATION, WITHOUT LONG-TERM CURRENT USE OF INSULIN (HCC): ICD-10-CM

## 2018-04-25 DIAGNOSIS — E78.2 MIXED HYPERLIPIDEMIA: ICD-10-CM

## 2018-04-25 PROCEDURE — 84439 ASSAY OF FREE THYROXINE: CPT

## 2018-04-25 PROCEDURE — 36415 COLL VENOUS BLD VENIPUNCTURE: CPT

## 2018-04-25 PROCEDURE — 83036 HEMOGLOBIN GLYCOSYLATED A1C: CPT

## 2018-04-25 PROCEDURE — 84443 ASSAY THYROID STIM HORMONE: CPT

## 2018-04-25 PROCEDURE — 84132 ASSAY OF SERUM POTASSIUM: CPT

## 2018-04-27 ENCOUNTER — OFFICE VISIT (OUTPATIENT)
Dept: INTERNAL MEDICINE CLINIC | Facility: CLINIC | Age: 83
End: 2018-04-27

## 2018-04-27 ENCOUNTER — TELEPHONE (OUTPATIENT)
Dept: INTERNAL MEDICINE CLINIC | Facility: CLINIC | Age: 83
End: 2018-04-27

## 2018-04-27 VITALS
OXYGEN SATURATION: 98 % | HEART RATE: 82 BPM | SYSTOLIC BLOOD PRESSURE: 130 MMHG | DIASTOLIC BLOOD PRESSURE: 60 MMHG | RESPIRATION RATE: 16 BRPM | WEIGHT: 221 LBS | HEIGHT: 69.5 IN | TEMPERATURE: 98 F | BODY MASS INDEX: 32 KG/M2

## 2018-04-27 DIAGNOSIS — Z13.31 SCREENING FOR DEPRESSION: ICD-10-CM

## 2018-04-27 DIAGNOSIS — K21.00 REFLUX ESOPHAGITIS: ICD-10-CM

## 2018-04-27 DIAGNOSIS — E78.2 MIXED HYPERLIPIDEMIA: ICD-10-CM

## 2018-04-27 DIAGNOSIS — E11.9 CONTROLLED TYPE 2 DIABETES MELLITUS WITHOUT COMPLICATION, WITHOUT LONG-TERM CURRENT USE OF INSULIN (HCC): ICD-10-CM

## 2018-04-27 DIAGNOSIS — G47.33 OSA (OBSTRUCTIVE SLEEP APNEA): ICD-10-CM

## 2018-04-27 DIAGNOSIS — I10 ESSENTIAL HYPERTENSION: ICD-10-CM

## 2018-04-27 DIAGNOSIS — Z00.00 ROUTINE GENERAL MEDICAL EXAMINATION AT A HEALTH CARE FACILITY: Primary | ICD-10-CM

## 2018-04-27 DIAGNOSIS — D12.6 TUBULAR ADENOMA OF COLON: ICD-10-CM

## 2018-04-27 DIAGNOSIS — M48.062 SPINAL STENOSIS OF LUMBAR REGION WITH NEUROGENIC CLAUDICATION: ICD-10-CM

## 2018-04-27 DIAGNOSIS — N40.1 BENIGN PROSTATIC HYPERPLASIA WITH URINARY OBSTRUCTION: ICD-10-CM

## 2018-04-27 DIAGNOSIS — N13.8 BENIGN PROSTATIC HYPERPLASIA WITH URINARY OBSTRUCTION: ICD-10-CM

## 2018-04-27 DIAGNOSIS — R26.89 NEED FOR ASSISTANCE DUE TO UNSTEADY GAIT: ICD-10-CM

## 2018-04-27 DIAGNOSIS — E03.9 HYPOTHYROIDISM, UNSPECIFIED TYPE: ICD-10-CM

## 2018-04-27 DIAGNOSIS — Z12.11 SCREEN FOR COLON CANCER: ICD-10-CM

## 2018-04-27 DIAGNOSIS — I48.91 ATRIAL FIBRILLATION, UNSPECIFIED TYPE (HCC): ICD-10-CM

## 2018-04-27 PROBLEM — E87.6 HYPOKALEMIA: Status: RESOLVED | Noted: 2017-06-20 | Resolved: 2018-04-27

## 2018-04-27 PROCEDURE — G0444 DEPRESSION SCREEN ANNUAL: HCPCS | Performed by: INTERNAL MEDICINE

## 2018-04-27 PROCEDURE — G0439 PPPS, SUBSEQ VISIT: HCPCS | Performed by: INTERNAL MEDICINE

## 2018-04-27 PROCEDURE — 99214 OFFICE O/P EST MOD 30 MIN: CPT | Performed by: INTERNAL MEDICINE

## 2018-04-27 RX ORDER — GABAPENTIN 300 MG/1
300 CAPSULE ORAL SEE ADMIN INSTRUCTIONS
Qty: 60 CAPSULE | Refills: 1 | Status: SHIPPED | OUTPATIENT
Start: 2018-04-27 | End: 2018-06-11

## 2018-04-27 RX ORDER — LEVOTHYROXINE SODIUM 0.07 MG/1
75 TABLET ORAL
Qty: 90 TABLET | Refills: 3 | Status: SHIPPED | OUTPATIENT
Start: 2018-04-27 | End: 2019-02-22

## 2018-04-27 NOTE — PATIENT INSTRUCTIONS
For your heartburn, please STOP the omeprazole and change to ranitidine 150 mg 30 minutes prior to breakfast and dinner. For your spinal stenosis and leg aching, please start gabapentin 300 mg nightly for 7 nights then increase to 300 mg twice daily.  O

## 2018-04-27 NOTE — PROGRESS NOTES
Angelica Lopez is a 80year old male. HPI:   Patient presents for medicare wellness exam and additional issues noted below. 1. HTN: doing well on losartan-hctz. 2. GERD: he forgot to trial ranitidine instead of omeprazole. willing to try.    3. Chroni SYSTEMS:   GENERAL HEALTH: feels well otherwise.  No f/c  NEURO: denies any headaches, LH, dizzyness, LOC  VISION: denies any blurred or double vision  RESPIRATORY: denies shortness of breath, cough, or congestion  CARDIOVASCULAR: denies chest pain, pressur DRAIN/INJECT LARGE JOINT/BURSA N/A      Comment: Procedure: LUMBAR EPIDURAL;  Surgeon:                Isabela Bacon MD;  Location: [address] [place] [address] FOR               PAIN MANAGEMENT  7/7/2015: DRAIN/INJECT LARGE JOINT/BURSA Right      Comment: Procedure: SI EPIDURAL;  Surgeon:                Suellen Lombardo MD;  Location:  [address] MANAGEMENT  4/7/2015: INJECTION, W/WO CONTRAST, DX/THERAPEUTIC SUBST* N/A      Comment: Procedure: LUMBAR EPIDURAL;  Surgeon:                Kristin Sanchez Location: [facility] FOR PAIN [facility]NAGEMENT  8/24/2015: PATIENT DOCUMENTED NOT TO HAVE EXPERIENCED ANY* Right      Comment: Procedure: PIRIFORMIS/SCIATIC NERVE INJECTION;               Surgeon: Dylon Soriano MD;  Location: [facility] (Oral)   Resp 16   Ht 69.5\"   Wt 221 lb   SpO2 98%   BMI 32.17 kg/m²   Medicare Hearing Evaluation: b/l rub test is equal  GENERAL: A&O well developed, well nourished,in no apparent distress  SKIN: no rashes,no suspicious lesions  HEENT: atraumatic, MMM, 0 in 1/2018  - Not on ASA b/c on coumadin  # HTN: controlled. Cont with losartan/hctz. # Hypothyroidism: normal TSH in 4/2018. Cont levothyroxine.    # HLP: well controlled on pravastatin  # GERD: stopping omeprazole, dietary discretion discussed, and stanislaw

## 2018-04-30 ENCOUNTER — TELEPHONE (OUTPATIENT)
Dept: INTERNAL MEDICINE CLINIC | Facility: CLINIC | Age: 83
End: 2018-04-30

## 2018-05-01 ENCOUNTER — TELEPHONE (OUTPATIENT)
Dept: INTERNAL MEDICINE CLINIC | Facility: CLINIC | Age: 83
End: 2018-05-01

## 2018-05-01 DIAGNOSIS — E78.2 MIXED HYPERLIPIDEMIA: ICD-10-CM

## 2018-05-01 DIAGNOSIS — I48.91 ATRIAL FIBRILLATION, UNSPECIFIED TYPE (HCC): ICD-10-CM

## 2018-05-01 DIAGNOSIS — E11.9 CONTROLLED TYPE 2 DIABETES MELLITUS WITHOUT COMPLICATION, WITHOUT LONG-TERM CURRENT USE OF INSULIN (HCC): ICD-10-CM

## 2018-05-01 DIAGNOSIS — R29.898 WEAKNESS OF BOTH LOWER EXTREMITIES: ICD-10-CM

## 2018-05-01 DIAGNOSIS — I10 ESSENTIAL HYPERTENSION: ICD-10-CM

## 2018-05-01 DIAGNOSIS — R26.81 GAIT INSTABILITY: ICD-10-CM

## 2018-05-01 DIAGNOSIS — K59.03 DRUG-INDUCED CONSTIPATION: ICD-10-CM

## 2018-05-01 RX ORDER — RANITIDINE 150 MG/1
150 TABLET ORAL EVERY MORNING
Qty: 90 TABLET | Refills: 1 | Status: SHIPPED | OUTPATIENT
Start: 2018-05-01 | End: 2018-06-11

## 2018-05-06 ENCOUNTER — APPOINTMENT (OUTPATIENT)
Dept: LAB | Age: 83
End: 2018-05-06
Attending: INTERNAL MEDICINE
Payer: MEDICARE

## 2018-05-06 PROCEDURE — 82272 OCCULT BLD FECES 1-3 TESTS: CPT

## 2018-05-07 ENCOUNTER — LAB ENCOUNTER (OUTPATIENT)
Dept: LAB | Age: 83
End: 2018-05-07
Attending: INTERNAL MEDICINE
Payer: MEDICARE

## 2018-05-07 DIAGNOSIS — D12.6 BENIGN NEOPLASM OF COLON: ICD-10-CM

## 2018-05-07 DIAGNOSIS — Z12.11 SPECIAL SCREENING FOR MALIGNANT NEOPLASMS, COLON: ICD-10-CM

## 2018-05-07 PROCEDURE — 82272 OCCULT BLD FECES 1-3 TESTS: CPT

## 2018-05-08 ENCOUNTER — LAB ENCOUNTER (OUTPATIENT)
Dept: LAB | Age: 83
End: 2018-05-08
Attending: INTERNAL MEDICINE
Payer: MEDICARE

## 2018-05-08 DIAGNOSIS — Z12.11 SPECIAL SCREENING FOR MALIGNANT NEOPLASMS, COLON: ICD-10-CM

## 2018-05-08 DIAGNOSIS — D12.6 BENIGN NEOPLASM OF COLON: ICD-10-CM

## 2018-05-08 PROCEDURE — 82272 OCCULT BLD FECES 1-3 TESTS: CPT

## 2018-05-14 DIAGNOSIS — I10 ESSENTIAL HYPERTENSION: ICD-10-CM

## 2018-05-14 RX ORDER — LOSARTAN POTASSIUM AND HYDROCHLOROTHIAZIDE 25; 100 MG/1; MG/1
1 TABLET ORAL DAILY
Qty: 90 TABLET | Refills: 0 | Status: SHIPPED | OUTPATIENT
Start: 2018-05-14 | End: 2018-08-04

## 2018-05-14 NOTE — TELEPHONE ENCOUNTER
Refill requested: Ronaldo HCTZ 100-25     Passed protocol      Last refill: 2/19/18 #90 NR  Relevant Labs: - Complete labs end of July      BP Readings from Last 3 Encounters:  04/27/18 : 130/60  01/10/18 : 132/72  11/17/17 : 126/68      Last OV / RTC advi

## 2018-05-17 PROBLEM — Z87.448 HISTORY OF URETHRAL STRICTURE: Status: ACTIVE | Noted: 2018-05-17

## 2018-05-23 ENCOUNTER — TELEPHONE (OUTPATIENT)
Dept: INTERNAL MEDICINE CLINIC | Facility: CLINIC | Age: 83
End: 2018-05-23

## 2018-05-23 NOTE — TELEPHONE ENCOUNTER
Patient seen his urologist (Dr. Jasbir Van) and he requested patient have a PSA level, what is Dr. Alana Isbell opinion?

## 2018-05-24 NOTE — TELEPHONE ENCOUNTER
This is not something I can answer over phone. PSA testing at his age is complex. I can discuss in person. This is NON urgent.

## 2018-05-26 DIAGNOSIS — I10 ESSENTIAL HYPERTENSION: ICD-10-CM

## 2018-05-29 NOTE — TELEPHONE ENCOUNTER
From: Carry Close  Sent: 5/26/2018 3:25 PM CDT  Subject: Medication Renewal Request    PEDRO Ruano would like a refill of the following medications:     Potassium Chloride ER 10 MEQ Oral Tab CR Gali Claudio MD]    Preferred pharmacy: [facility]

## 2018-05-30 RX ORDER — POTASSIUM CHLORIDE 750 MG/1
10 TABLET, FILM COATED, EXTENDED RELEASE ORAL DAILY
Qty: 90 TABLET | Refills: 1 | Status: SHIPPED
Start: 2018-05-30 | End: 2018-11-24

## 2018-05-30 NOTE — TELEPHONE ENCOUNTER
Potassium er 10 meq 1 tab daily filled 2/9/18 5 tab with 0 refills     LOV 4/27/18     return in 6 months for DM.     Next apt 6/29/18    Labs 4/25/18    Potassium 3.6 - 5.1 mmol/L 3.7

## 2018-06-02 DIAGNOSIS — E78.2 MIXED HYPERLIPIDEMIA: ICD-10-CM

## 2018-06-04 RX ORDER — PRAVASTATIN SODIUM 40 MG
40 TABLET ORAL DAILY
Qty: 90 TABLET | Refills: 0 | Status: SHIPPED | OUTPATIENT
Start: 2018-06-04 | End: 2018-08-04

## 2018-06-04 NOTE — TELEPHONE ENCOUNTER
Refill requested: Pravastatin 40 mg     Passed protocol    Last refill: 2/9/18 #5 NR   Relevant Labs:  1/5/18 Lipid Panel   Last OV / RTC advised: 4/27/18 RTC in 6 months for DM   Appt Scheduled: yes   Your appointments     Date & Time Appointment Departme

## 2018-06-04 NOTE — TELEPHONE ENCOUNTER
From: Gianna Case  Sent: 6/2/2018 8:55 AM CDT  Subject: Medication Renewal Request    PEDRO Calloway would like a refill of the following medications:     Pravastatin Sodium 40 MG Oral Tab Boone South MD]    Preferred pharmacy: [place] [address]

## 2018-06-10 ENCOUNTER — PATIENT MESSAGE (OUTPATIENT)
Dept: INTERNAL MEDICINE CLINIC | Facility: CLINIC | Age: 83
End: 2018-06-10

## 2018-06-11 RX ORDER — NICOTINE POLACRILEX 4 MG/1
20 GUM, CHEWING ORAL DAILY
Qty: 30 TABLET | Refills: 0 | COMMUNITY
Start: 2018-06-11 | End: 2018-06-18

## 2018-06-11 NOTE — TELEPHONE ENCOUNTER
From: Miladis Espinal  Sent: 6/10/2018 10:16 AM CDT  Subject: Medication Renewal Request    PEDRO Swain would like a refill of the following medications:     TraMADol HCl 50 MG Oral Tab Rebeca Mckenzie MD]    Preferred pharmacy: [place] [phone]

## 2018-06-11 NOTE — TELEPHONE ENCOUNTER
From: Sara Bingham  To: Sydney Montes MD  Sent: 6/10/2018 10:29 AM CDT  Subject: Prescription Question    You agreed with my request to discontinue ranitidine and gabapentin please remove them from my active drug list and add omeprazole 20 mg

## 2018-06-11 NOTE — TELEPHONE ENCOUNTER
TraMADol HCl 50 MG Oral Tab 180 tablet 0 3/5/2018    Sig :  Take 1 tablet (50 mg total) by mouth 2 (two) times daily as needed for Pain. Route:   Oral       Patient has appointment 6/24/18.

## 2018-06-13 NOTE — TELEPHONE ENCOUNTER
Spoke to pt and pt states he has enough until Tramadol to hold him off until the 18th. Pt wants to restart Tramadol and Omeprazole and dc Gabapentin and Ranitidine.

## 2018-06-18 RX ORDER — TRAMADOL HYDROCHLORIDE 50 MG/1
50 TABLET ORAL 2 TIMES DAILY PRN
Qty: 180 TABLET | Refills: 0 | Status: SHIPPED | OUTPATIENT
Start: 2018-06-18 | End: 2018-09-16

## 2018-06-18 NOTE — TELEPHONE ENCOUNTER
From: Shawn Larsen  Sent: 6/18/2018 2:31 PM CDT  Subject: Medication Renewal Request    PEDRO Purvis would like a refill of the following medications:     Omeprazole 20 MG Oral Tab EC    Preferred pharmacy: [place] [address] AT [address], [phone], [phone]

## 2018-06-21 ENCOUNTER — PATIENT MESSAGE (OUTPATIENT)
Dept: INTERNAL MEDICINE CLINIC | Facility: CLINIC | Age: 83
End: 2018-06-21

## 2018-06-21 RX ORDER — OMEPRAZOLE 20 MG/1
CAPSULE, DELAYED RELEASE ORAL
Qty: 90 CAPSULE | Refills: 0 | OUTPATIENT
Start: 2018-06-21

## 2018-06-21 RX ORDER — NICOTINE POLACRILEX 4 MG/1
20 GUM, CHEWING ORAL DAILY
Qty: 30 TABLET | Refills: 0 | Status: SHIPPED
Start: 2018-06-21 | End: 2018-06-21

## 2018-06-21 RX ORDER — NICOTINE POLACRILEX 4 MG/1
20 GUM, CHEWING ORAL DAILY
Qty: 30 TABLET | Refills: 0
Start: 2018-06-21

## 2018-06-21 RX ORDER — NICOTINE POLACRILEX 4 MG/1
20 GUM, CHEWING ORAL DAILY
Qty: 90 TABLET | Refills: 3 | Status: SHIPPED | OUTPATIENT
Start: 2018-06-21 | End: 2018-07-10

## 2018-06-21 NOTE — TELEPHONE ENCOUNTER
Omeprazole 20 mg 1 cap daily filled 6-21-18 30 with 0 refills     LOV 4-27-18     GERD: he forgot to trial ranitidine instead of omeprazole. willing to try     GERD: stopping omeprazole, dietary discretion discussed, and will try zantac if sx recur.

## 2018-06-21 NOTE — TELEPHONE ENCOUNTER
Omeprazole 20 MG Oral Tab EC 30 tablet 0 6/11/2018     Sig - Route:  Take 20 mg by mouth daily. - Oral      Pharmacy did not receive script

## 2018-06-29 ENCOUNTER — OFFICE VISIT (OUTPATIENT)
Dept: INTERNAL MEDICINE CLINIC | Facility: CLINIC | Age: 83
End: 2018-06-29

## 2018-06-29 VITALS
HEIGHT: 69 IN | TEMPERATURE: 98 F | DIASTOLIC BLOOD PRESSURE: 80 MMHG | SYSTOLIC BLOOD PRESSURE: 126 MMHG | RESPIRATION RATE: 16 BRPM | OXYGEN SATURATION: 96 % | HEART RATE: 60 BPM | WEIGHT: 218.75 LBS | BODY MASS INDEX: 32.4 KG/M2

## 2018-06-29 DIAGNOSIS — M48.062 SPINAL STENOSIS OF LUMBAR REGION WITH NEUROGENIC CLAUDICATION: Primary | ICD-10-CM

## 2018-06-29 DIAGNOSIS — E78.2 MIXED HYPERLIPIDEMIA: ICD-10-CM

## 2018-06-29 DIAGNOSIS — E03.9 HYPOTHYROIDISM, UNSPECIFIED TYPE: ICD-10-CM

## 2018-06-29 DIAGNOSIS — E11.9 CONTROLLED TYPE 2 DIABETES MELLITUS WITHOUT COMPLICATION, WITHOUT LONG-TERM CURRENT USE OF INSULIN (HCC): ICD-10-CM

## 2018-06-29 DIAGNOSIS — K21.00 REFLUX ESOPHAGITIS: ICD-10-CM

## 2018-06-29 DIAGNOSIS — I48.91 ATRIAL FIBRILLATION, UNSPECIFIED TYPE (HCC): ICD-10-CM

## 2018-06-29 PROCEDURE — 99214 OFFICE O/P EST MOD 30 MIN: CPT | Performed by: INTERNAL MEDICINE

## 2018-06-29 NOTE — PROGRESS NOTES
Kevon Allen is a 80year old male. HPI:   Patient presents for the following issues. 1. BPH with stricture: rapaflo was very expensive so Dr. Robb Brooke changed him to finasteride and flomax. Symptoms are manageable and more affordable med regimen.   2. OTHER (SEE COMMENTS)    Comment:Constipation  Lovastatin              MYALGIA    Family History   Problem Relation Age of Onset   • Cancer Father    • Cancer Mother    • Heart Disease Neg    • Stroke Neg       Past Medical History:   Diagnosis Date   • Arr & LOCLZJ NDL/CATH SPI DX/THER HHJ N/A      Comment: Procedure: LUMBAR EPIDURAL;  Surgeon:                Tobin Mares MD;  Location: [place] [address] MANAGEMENT  4/27/2015: FLUOROSCOPIC GUIDANCE NEEDLE PLACEMENT N/A      Comment: Proce Comment: L3-L4  4/2013: OTHER SURGICAL HISTORY      Comment: right lumbar facet steroid injection   8/28/2014: OTHER SURGICAL HISTORY      Comment: L3-5 post LIF  4/27/2015: PATIENT DOCUMENTED NOT TO HAVE EXPERIENCED ANY* N/A      Comment: Procedure: Nikia German Mu Adames MD;  Location: [address] [place] [address] Drive MANAGEMENT  7/7/2015: PATIENT Elly Lind PREOPERATIVE ORDER FOR IV ANT* Right      Comment: Procedure: SI JOINT INJECTION;  Surgeon:                Mu Adames MD;  Location: [address] [place] [address] chiropractic care. Using tramadol. His joint ortho surgeon, Dr. Rima Cloud, has ordered MRI lumbar spine for further evaluation.   - gabapentin was not effective.   - Dr. Joseph Lindo recommended surgery but patient declines.  Wants to focus on quality of life Afsaneh Wong is medical POA. Next would be son, Lev Espinoza. Full Code, but no prolonged life support.   Healthcare Living Will: has one, will bring in copy     Care Team:  Dr. Keshav Meza (cardiology)  Dr. Louisa Villalba (Eye)  Dr. Charlotte Israel (pain)  Dr. Siomara Pastor

## 2018-07-02 ENCOUNTER — LAB ENCOUNTER (OUTPATIENT)
Dept: LAB | Age: 83
End: 2018-07-02
Attending: INTERNAL MEDICINE
Payer: MEDICARE

## 2018-07-02 DIAGNOSIS — M48.062 SPINAL STENOSIS OF LUMBAR REGION WITH NEUROGENIC CLAUDICATION: ICD-10-CM

## 2018-07-02 DIAGNOSIS — D64.9 NORMOCYTIC ANEMIA: ICD-10-CM

## 2018-07-02 DIAGNOSIS — E03.9 HYPOTHYROIDISM, UNSPECIFIED TYPE: ICD-10-CM

## 2018-07-02 DIAGNOSIS — Z79.01 MONITORING FOR LONG-TERM ANTICOAGULANT USE: ICD-10-CM

## 2018-07-02 DIAGNOSIS — I10 ESSENTIAL HYPERTENSION: ICD-10-CM

## 2018-07-02 DIAGNOSIS — E78.2 MIXED HYPERLIPIDEMIA: ICD-10-CM

## 2018-07-02 DIAGNOSIS — Z51.81 MONITORING FOR LONG-TERM ANTICOAGULANT USE: ICD-10-CM

## 2018-07-02 DIAGNOSIS — Z00.00 ROUTINE GENERAL MEDICAL EXAMINATION AT A HEALTH CARE FACILITY: ICD-10-CM

## 2018-07-02 DIAGNOSIS — R26.89 NEED FOR ASSISTANCE DUE TO UNSTEADY GAIT: ICD-10-CM

## 2018-07-02 DIAGNOSIS — E11.9 CONTROLLED TYPE 2 DIABETES MELLITUS WITHOUT COMPLICATION, WITHOUT LONG-TERM CURRENT USE OF INSULIN (HCC): ICD-10-CM

## 2018-07-02 DIAGNOSIS — I48.91 ATRIAL FIBRILLATION, UNSPECIFIED TYPE (HCC): ICD-10-CM

## 2018-07-02 LAB
ALT SERPL-CCNC: 38 U/L (ref 17–63)
AST SERPL-CCNC: 22 U/L (ref 15–41)
BASOPHILS # BLD AUTO: 0.06 X10(3) UL (ref 0–0.1)
BASOPHILS NFR BLD AUTO: 0.8 %
BUN BLD-MCNC: 19 MG/DL (ref 8–20)
CALCIUM BLD-MCNC: 9.1 MG/DL (ref 8.3–10.3)
CHLORIDE: 103 MMOL/L (ref 101–111)
CO2: 29 MMOL/L (ref 22–32)
CREAT BLD-MCNC: 0.9 MG/DL (ref 0.7–1.3)
DEPRECATED HBV CORE AB SER IA-ACNC: 79.1 NG/ML (ref 30–530)
EOSINOPHIL # BLD AUTO: 0.15 X10(3) UL (ref 0–0.3)
EOSINOPHIL NFR BLD AUTO: 2.1 %
ERYTHROCYTE [DISTWIDTH] IN BLOOD BY AUTOMATED COUNT: 13.7 % (ref 11.5–16)
GLUCOSE BLD-MCNC: 190 MG/DL (ref 70–99)
HCT VFR BLD AUTO: 39.5 % (ref 37–53)
HGB BLD-MCNC: 12.8 G/DL (ref 13–17)
IMMATURE GRANULOCYTE COUNT: 0.04 X10(3) UL (ref 0–1)
IMMATURE GRANULOCYTE RATIO %: 0.6 %
IRON SATURATION: 18 % (ref 20–50)
IRON: 51 UG/DL (ref 45–182)
LYMPHOCYTES # BLD AUTO: 1.35 X10(3) UL (ref 0.9–4)
LYMPHOCYTES NFR BLD AUTO: 18.9 %
MCH RBC QN AUTO: 30.2 PG (ref 27–33.2)
MCHC RBC AUTO-ENTMCNC: 32.4 G/DL (ref 31–37)
MCV RBC AUTO: 93.2 FL (ref 80–99)
MONOCYTES # BLD AUTO: 0.57 X10(3) UL (ref 0.1–1)
MONOCYTES NFR BLD AUTO: 8 %
NEUTROPHIL ABS PRELIM: 4.97 X10 (3) UL (ref 1.3–6.7)
NEUTROPHILS # BLD AUTO: 4.97 X10(3) UL (ref 1.3–6.7)
NEUTROPHILS NFR BLD AUTO: 69.6 %
PLATELET # BLD AUTO: 228 10(3)UL (ref 150–450)
POTASSIUM SERPL-SCNC: 4 MMOL/L (ref 3.6–5.1)
RBC # BLD AUTO: 4.24 X10(6)UL (ref 3.8–5.8)
RED CELL DISTRIBUTION WIDTH-SD: 46.5 FL (ref 35.1–46.3)
SODIUM SERPL-SCNC: 139 MMOL/L (ref 136–144)
TOTAL IRON BINDING CAPACITY: 286 UG/DL (ref 240–450)
TRANSFERRIN: 192 MG/DL (ref 200–360)
WBC # BLD AUTO: 7.1 X10(3) UL (ref 4–13)

## 2018-07-02 PROCEDURE — 84460 ALANINE AMINO (ALT) (SGPT): CPT

## 2018-07-02 PROCEDURE — 85025 COMPLETE CBC W/AUTO DIFF WBC: CPT

## 2018-07-02 PROCEDURE — 83550 IRON BINDING TEST: CPT

## 2018-07-02 PROCEDURE — 80048 BASIC METABOLIC PNL TOTAL CA: CPT

## 2018-07-02 PROCEDURE — 84450 TRANSFERASE (AST) (SGOT): CPT

## 2018-07-02 PROCEDURE — 36415 COLL VENOUS BLD VENIPUNCTURE: CPT

## 2018-07-02 PROCEDURE — 82728 ASSAY OF FERRITIN: CPT

## 2018-07-02 PROCEDURE — 83540 ASSAY OF IRON: CPT

## 2018-07-02 NOTE — PROGRESS NOTES
Dr. Sabine Garrett,  [facility], this was ordered per [facility] protocol for annual CBC.

## 2018-07-06 DIAGNOSIS — M48.062 SPINAL STENOSIS OF LUMBAR REGION WITH NEUROGENIC CLAUDICATION: ICD-10-CM

## 2018-07-06 DIAGNOSIS — E11.9 CONTROLLED TYPE 2 DIABETES MELLITUS WITHOUT COMPLICATION, WITHOUT LONG-TERM CURRENT USE OF INSULIN (HCC): ICD-10-CM

## 2018-07-06 DIAGNOSIS — E11.9 DIABETES MELLITUS WITHOUT COMPLICATION (HCC): ICD-10-CM

## 2018-07-06 DIAGNOSIS — E03.9 HYPOTHYROIDISM, UNSPECIFIED TYPE: ICD-10-CM

## 2018-07-06 DIAGNOSIS — I48.91 ATRIAL FIBRILLATION, UNSPECIFIED TYPE (HCC): ICD-10-CM

## 2018-07-06 DIAGNOSIS — I10 ESSENTIAL HYPERTENSION: ICD-10-CM

## 2018-07-06 DIAGNOSIS — Z00.00 ROUTINE GENERAL MEDICAL EXAMINATION AT A HEALTH CARE FACILITY: ICD-10-CM

## 2018-07-06 DIAGNOSIS — E78.2 MIXED HYPERLIPIDEMIA: ICD-10-CM

## 2018-07-06 DIAGNOSIS — R26.89 NEED FOR ASSISTANCE DUE TO UNSTEADY GAIT: ICD-10-CM

## 2018-07-06 RX ORDER — LEVOTHYROXINE SODIUM 0.07 MG/1
75 TABLET ORAL
Qty: 90 TABLET | Refills: 3 | Status: CANCELLED
Start: 2018-07-06

## 2018-07-06 RX ORDER — NICOTINE POLACRILEX 4 MG/1
20 GUM, CHEWING ORAL DAILY
Qty: 90 TABLET | Refills: 3 | Status: CANCELLED
Start: 2018-07-06

## 2018-07-09 RX ORDER — DOCUSATE SODIUM 100 MG/1
100 CAPSULE, LIQUID FILLED ORAL 2 TIMES DAILY PRN
Qty: 180 CAPSULE | Refills: 3 | Status: SHIPPED | OUTPATIENT
Start: 2018-07-09 | End: 2018-11-26

## 2018-07-09 RX ORDER — GLIPIZIDE 5 MG/1
5 TABLET ORAL
Qty: 90 TABLET | Refills: 1 | Status: SHIPPED | OUTPATIENT
Start: 2018-07-09 | End: 2018-12-31

## 2018-07-09 NOTE — TELEPHONE ENCOUNTER
Refill requested: Docusate - Glipizide - Levothyroxine - Omeprazole     Failed protocol - Omeprazole + Docusate   Passed protocol - Glipizide + Levothyroxine       Last refill:  6/21/2018 Omeprezole #90 3R         01/16/2018 Docusate 100 mg #60 1R        0

## 2018-07-09 NOTE — TELEPHONE ENCOUNTER
From: Soledad Lilly  Sent: 7/6/2018 9:54 PM CDT  Subject: Medication Renewal Request    PEDRO Mccord would like a refill of the following medications:     docusate sodium (DOCQLACE) 100 MG Oral Cap Marly Arechiga MD]     glipiZIDE 5 MG Oral Tab Zuleika Mckenzie

## 2018-07-10 ENCOUNTER — APPOINTMENT (OUTPATIENT)
Dept: LAB | Age: 83
End: 2018-07-10
Attending: INTERNAL MEDICINE
Payer: MEDICARE

## 2018-07-10 DIAGNOSIS — D64.9 NORMOCYTIC ANEMIA: ICD-10-CM

## 2018-07-10 PROCEDURE — 83021 HEMOGLOBIN CHROMOTOGRAPHY: CPT

## 2018-07-10 PROCEDURE — 36415 COLL VENOUS BLD VENIPUNCTURE: CPT

## 2018-07-12 LAB
HEMOGLOBIN - OTHER: 0 %
HEMOGLOBIN A2: 2.5 %
HEMOGLOBIN A: 97 %
HEMOGLOBIN C: 0 %
HEMOGLOBIN E: 0 %
HEMOGLOBIN F: 0.5 %
HEMOGLOBIN S: 0 %

## 2018-07-27 ENCOUNTER — HOSPITAL ENCOUNTER (OUTPATIENT)
Dept: MRI IMAGING | Age: 83
Discharge: HOME | End: 2018-07-27
Attending: ORTHOPAEDIC SURGERY
Payer: MEDICARE

## 2018-07-27 DIAGNOSIS — M48.061 LUMBAR STENOSIS: ICD-10-CM

## 2018-07-27 PROCEDURE — 72158 MRI LUMBAR SPINE W/O & W/DYE: CPT | Performed by: ORTHOPAEDIC SURGERY

## 2018-07-27 PROCEDURE — A9576 INJ PROHANCE MULTIPACK: HCPCS | Performed by: RADIOLOGY

## 2018-08-04 DIAGNOSIS — I10 ESSENTIAL HYPERTENSION: ICD-10-CM

## 2018-08-04 DIAGNOSIS — E78.2 MIXED HYPERLIPIDEMIA: ICD-10-CM

## 2018-08-04 DIAGNOSIS — E11.9 DIABETES MELLITUS WITHOUT COMPLICATION (HCC): ICD-10-CM

## 2018-08-06 RX ORDER — LOSARTAN POTASSIUM AND HYDROCHLOROTHIAZIDE 25; 100 MG/1; MG/1
1 TABLET ORAL DAILY
Qty: 90 TABLET | Refills: 0 | Status: SHIPPED
Start: 2018-08-06 | End: 2018-11-10

## 2018-08-06 RX ORDER — PRAVASTATIN SODIUM 40 MG
40 TABLET ORAL DAILY
Qty: 90 TABLET | Refills: 0 | Status: SHIPPED
Start: 2018-08-06 | End: 2018-11-10

## 2018-08-06 NOTE — TELEPHONE ENCOUNTER
From: Treva Chisholm  Sent: 8/4/2018 3:32 PM CDT  Subject: Medication Renewal Request    PEDRO Pardo would like a refill of the following medications:     MetFORMIN HCl 500 MG Oral Tab Leeann Pike MD]     Losartan Potassium-HCTZ 100-25 MG Oral Tab [

## 2018-08-18 DIAGNOSIS — I10 ESSENTIAL HYPERTENSION: ICD-10-CM

## 2018-08-20 RX ORDER — POTASSIUM CHLORIDE 750 MG/1
10 TABLET, FILM COATED, EXTENDED RELEASE ORAL DAILY
Qty: 90 TABLET | Refills: 1 | OUTPATIENT
Start: 2018-08-20

## 2018-08-20 NOTE — TELEPHONE ENCOUNTER
From: Gianna Case  Sent: 8/18/2018 9:50 PM CDT  Subject: Medication Renewal Request    PEDRO Calloway would like a refill of the following medications:     Potassium Chloride ER 10 MEQ Oral Tab CR Navjot Adame MD]    Preferred pharmacy: [facility]

## 2018-08-20 NOTE — TELEPHONE ENCOUNTER
Declined to pharmacy - rx not due for refill until 11/2018.     Refill requested: Potassium Chloride ER 10 meq    Failed protocol    Last refill: 5/30/18 #90 1R - Due 11/2018 (Dex [place])    Relevant Labs:  Last OV / RTC advised: 6/29/18 Dr Laurie Nova

## 2018-09-11 ENCOUNTER — TELEPHONE (OUTPATIENT)
Dept: INTERNAL MEDICINE CLINIC | Facility: CLINIC | Age: 83
End: 2018-09-11

## 2018-09-11 NOTE — TELEPHONE ENCOUNTER
Patient c/o chronic constipation and flatus, patient does have GI (Dr. Chicho Lin) recommended patient contact GI, patient verbalized

## 2018-09-17 RX ORDER — TRAMADOL HYDROCHLORIDE 50 MG/1
50 TABLET ORAL 2 TIMES DAILY PRN
Qty: 180 TABLET | Refills: 0 | Status: SHIPPED | OUTPATIENT
Start: 2018-09-17 | End: 2018-10-29

## 2018-09-17 NOTE — TELEPHONE ENCOUNTER
Refill requested:   Requested Prescriptions     Pending Prescriptions Disp Refills   • TraMADol HCl 50 MG Oral Tab 180 tablet 0     Sig: Take 1 tablet (50 mg total) by mouth 2 (two) times daily as needed for Pain.      Passed protocol    Last refill: 6/18/1

## 2018-09-24 ENCOUNTER — PATIENT MESSAGE (OUTPATIENT)
Dept: INTERNAL MEDICINE CLINIC | Facility: CLINIC | Age: 83
End: 2018-09-24

## 2018-09-25 RX ORDER — OMEPRAZOLE 20 MG/1
20 CAPSULE, DELAYED RELEASE ORAL
Qty: 90 CAPSULE | Refills: 3 | Status: SHIPPED | OUTPATIENT
Start: 2018-09-25 | End: 2019-09-24

## 2018-09-25 NOTE — TELEPHONE ENCOUNTER
Omeprazole oral tab last filled 7/10/18 #90 with 3 refills -     Please see TE from 7/9/18 - No sure which medication should be filled Tablet or capsule

## 2018-10-19 ENCOUNTER — TELEPHONE (OUTPATIENT)
Dept: INTERNAL MEDICINE CLINIC | Facility: CLINIC | Age: 83
End: 2018-10-19

## 2018-10-23 NOTE — TELEPHONE ENCOUNTER
Spoke to patient has appointment with Dr. Song Hidalgo, Friday 10/26/18 for consultation re: constipation but patient concerned about colonoscopy date as Dr. Sharonda Bolaños schedule is very difficult to schedule time, assured patient if colonoscopy needed soon Dr. Alisha Chao

## 2018-11-10 DIAGNOSIS — E78.2 MIXED HYPERLIPIDEMIA: ICD-10-CM

## 2018-11-10 DIAGNOSIS — I10 ESSENTIAL HYPERTENSION: ICD-10-CM

## 2018-11-12 RX ORDER — LOSARTAN POTASSIUM AND HYDROCHLOROTHIAZIDE 25; 100 MG/1; MG/1
1 TABLET ORAL DAILY
Qty: 90 TABLET | Refills: 0 | Status: SHIPPED | OUTPATIENT
Start: 2018-11-12 | End: 2019-02-11

## 2018-11-12 RX ORDER — PRAVASTATIN SODIUM 40 MG
TABLET ORAL
Qty: 90 TABLET | Refills: 0 | Status: SHIPPED | OUTPATIENT
Start: 2018-11-12 | End: 2019-02-11

## 2018-11-12 NOTE — TELEPHONE ENCOUNTER
Refill requested:   Requested Prescriptions     Pending Prescriptions Disp Refills   • PRAVASTATIN SODIUM 40 MG Oral Tab [Pharmacy Med Name: PRAVASTATIN 40MG TABLETS] 90 tablet 0     Sig: TAKE 1 TABLET(40 MG) BY MOUTH DAILY   • LOSARTAN POTASSIUM-HCTZ 100-

## 2018-11-19 ENCOUNTER — OFFICE VISIT (OUTPATIENT)
Dept: INTERNAL MEDICINE CLINIC | Facility: CLINIC | Age: 83
End: 2018-11-19
Payer: MEDICARE

## 2018-11-19 VITALS
DIASTOLIC BLOOD PRESSURE: 62 MMHG | RESPIRATION RATE: 14 BRPM | SYSTOLIC BLOOD PRESSURE: 124 MMHG | HEIGHT: 68.5 IN | WEIGHT: 224 LBS | TEMPERATURE: 98 F | HEART RATE: 62 BPM | OXYGEN SATURATION: 96 % | BODY MASS INDEX: 33.56 KG/M2

## 2018-11-19 DIAGNOSIS — K59.04 CHRONIC IDIOPATHIC CONSTIPATION: ICD-10-CM

## 2018-11-19 DIAGNOSIS — I10 ESSENTIAL HYPERTENSION: ICD-10-CM

## 2018-11-19 DIAGNOSIS — E03.9 ACQUIRED HYPOTHYROIDISM: ICD-10-CM

## 2018-11-19 DIAGNOSIS — M48.062 SPINAL STENOSIS OF LUMBAR REGION WITH NEUROGENIC CLAUDICATION: Primary | ICD-10-CM

## 2018-11-19 DIAGNOSIS — E11.9 CONTROLLED TYPE 2 DIABETES MELLITUS WITHOUT COMPLICATION, WITHOUT LONG-TERM CURRENT USE OF INSULIN (HCC): ICD-10-CM

## 2018-11-19 PROCEDURE — 83036 HEMOGLOBIN GLYCOSYLATED A1C: CPT | Performed by: INTERNAL MEDICINE

## 2018-11-19 PROCEDURE — 99215 OFFICE O/P EST HI 40 MIN: CPT | Performed by: INTERNAL MEDICINE

## 2018-11-19 RX ORDER — MULTIVIT-MIN/IRON/FOLIC ACID/K 18-600-40
2000 CAPSULE ORAL DAILY
Qty: 90 CAPSULE | Refills: 3 | Status: SHIPPED | OUTPATIENT
Start: 2018-11-19 | End: 2018-12-19

## 2018-11-19 RX ORDER — TRAMADOL HYDROCHLORIDE 50 MG/1
50 TABLET ORAL 2 TIMES DAILY PRN
Qty: 60 TABLET | Refills: 0 | Status: SHIPPED | OUTPATIENT
Start: 2018-11-19 | End: 2018-12-10

## 2018-11-19 RX ORDER — TRAMADOL HYDROCHLORIDE 50 MG/1
50 TABLET ORAL 2 TIMES DAILY PRN
Qty: 60 TABLET | Refills: 0 | Status: SHIPPED | OUTPATIENT
Start: 2018-11-19 | End: 2018-11-19

## 2018-11-19 NOTE — PROGRESS NOTES
Sara Bingham is a 80year old male. HPI:   Patient presents for the following issues. 1. Lumbar spinal stenosis wit neurogenic claudication: pain is well controlled on tramadol however, he is concerned about side effects.  He did get another opinion abdominal pain, diarrhea, n/v, BRBPR, melena. : no dysuria, frequency, or hematuria  PSYCH: mood is stable. Denies depression or anxiety.      Wt Readings from Last 6 Encounters:  11/19/18 : 224 lb  10/26/18 : 221 lb  06/29/18 : 218 lb 12 oz  04/27/18 : L3-5 post LIF   • PIRIFORMIS/SCIATIC NERVE INJECTION Right 8/24/2015    Performed by Kathy Tai MD at [address] [facility]   • PIRIFORMIS/SCIATIC NERVE INJECTION Right 4/7/2015    Performed by Tobin Mares MD at [address] [facility] Difficulties: repeat MMSE today is 27/30 (it was 24/30 in 4/2017). We discussed options but he agrees with continued monitoring for now.    # Lumbar Spinal Stenosis s/p L3-5 fusion (8/28/14) with neurogenic claudication: chronic, pain is very well controlle for quality of life. # Health Maintenance: medicare wellness exam 4/27/2018  Colon Cancer Screening: colonoscopy in 3/2014 with hyperplastic and adenomatous polyps. He declines repeating a colonoscopy. FIT testing negative in 2018.    Bone Health: Does no

## 2018-11-19 NOTE — PATIENT INSTRUCTIONS
Please bring your machine into your next office visit to ensure it is accurate. Please measure your blood pressure and pulse daily and record these values.  Please measure your blood pressure in the afternoon or evening when you have been relaxing for 15 m

## 2018-11-24 DIAGNOSIS — I10 ESSENTIAL HYPERTENSION: ICD-10-CM

## 2018-11-26 RX ORDER — POTASSIUM CHLORIDE 750 MG/1
TABLET, FILM COATED, EXTENDED RELEASE ORAL
Qty: 90 TABLET | Refills: 3 | Status: SHIPPED | OUTPATIENT
Start: 2018-11-26 | End: 2019-02-14

## 2018-11-26 NOTE — TELEPHONE ENCOUNTER
Refill requested:   Requested Prescriptions     Pending Prescriptions Disp Refills   • POTASSIUM CHLORIDE ER 10 MEQ Oral Tab CR [Pharmacy Med Name: POTASSIUM CL 10MEQ ER TABLETS] 90 tablet 0     Sig: TAKE 1 TABLET BY MOUTH DAILY     Non protocol medication

## 2018-11-27 RX ORDER — DOCUSATE SODIUM 100 MG/1
100 CAPSULE, LIQUID FILLED ORAL 2 TIMES DAILY PRN
Qty: 180 CAPSULE | Refills: 3 | Status: SHIPPED | OUTPATIENT
Start: 2018-11-27 | End: 2019-09-21

## 2018-11-28 NOTE — TELEPHONE ENCOUNTER
Refill requested:   Requested Prescriptions     Pending Prescriptions Disp Refills   • docusate sodium (DOCQLACE) 100 MG Oral Cap 180 capsule 3     Sig: Take 1 capsule (100 mg total) by mouth 2 (two) times daily as needed for constipation.        Non protoc

## 2018-12-10 ENCOUNTER — PATIENT MESSAGE (OUTPATIENT)
Dept: INTERNAL MEDICINE CLINIC | Facility: CLINIC | Age: 83
End: 2018-12-10

## 2018-12-10 DIAGNOSIS — M48.062 SPINAL STENOSIS OF LUMBAR REGION WITH NEUROGENIC CLAUDICATION: ICD-10-CM

## 2018-12-10 DIAGNOSIS — K59.04 CHRONIC IDIOPATHIC CONSTIPATION: ICD-10-CM

## 2018-12-10 DIAGNOSIS — I10 ESSENTIAL HYPERTENSION: ICD-10-CM

## 2018-12-10 DIAGNOSIS — E11.9 CONTROLLED TYPE 2 DIABETES MELLITUS WITHOUT COMPLICATION, WITHOUT LONG-TERM CURRENT USE OF INSULIN (HCC): ICD-10-CM

## 2018-12-10 DIAGNOSIS — E03.9 ACQUIRED HYPOTHYROIDISM: ICD-10-CM

## 2018-12-10 RX ORDER — TRAMADOL HYDROCHLORIDE 50 MG/1
50 TABLET ORAL 2 TIMES DAILY PRN
Qty: 180 TABLET | Refills: 0 | Status: SHIPPED | OUTPATIENT
Start: 2018-12-10 | End: 2019-03-24

## 2018-12-10 NOTE — TELEPHONE ENCOUNTER
From: Morteza Leonard  To: Hiren Scott MD  Sent: 12/10/2018 12:39 PM CST  Subject: Prescription Question    DR Kimmy Voss. ASs discussed on my last visit [address] [facility] is limiting Tramadol refills to a 30 day supply effective   January 1 2019.  You gave me

## 2018-12-31 DIAGNOSIS — E11.9 DIABETES MELLITUS WITHOUT COMPLICATION (HCC): ICD-10-CM

## 2019-01-01 RX ORDER — GLIPIZIDE 5 MG/1
TABLET ORAL
Qty: 90 TABLET | Refills: 0 | Status: SHIPPED | OUTPATIENT
Start: 2019-01-01 | End: 2019-03-25

## 2019-01-01 NOTE — TELEPHONE ENCOUNTER
Refill requested:   Requested Prescriptions     Pending Prescriptions Disp Refills   • GLIPIZIDE 5 MG Oral Tab [Pharmacy Med Name: GLIPIZIDE 5MG TABLETS] 90 tablet 0     Sig: TAKE 1 TABLET(5 MG) BY MOUTH EVERY MORNING BEFORE BREAKFAST          Passed stephy

## 2019-01-11 ENCOUNTER — PATIENT MESSAGE (OUTPATIENT)
Dept: INTERNAL MEDICINE CLINIC | Facility: CLINIC | Age: 84
End: 2019-01-11

## 2019-01-12 DIAGNOSIS — E11.9 DIABETES MELLITUS WITHOUT COMPLICATION (HCC): ICD-10-CM

## 2019-01-14 NOTE — TELEPHONE ENCOUNTER
Refill requested:   Requested Prescriptions     Pending Prescriptions Disp Refills   • MetFORMIN HCl 500 MG Oral Tab 180 tablet 1     Sig: TAKE 1 TABLET(500 MG) BY MOUTH TWICE DAILY WITH MEALS       Failed protocol    Diabetic Medication Protocol Failed1/1

## 2019-01-14 NOTE — TELEPHONE ENCOUNTER
From: Soledad Lilly  To: Gladis Hudson MD  Sent: 1/11/2019 10:23 PM CST  Subject: Visit Follow-up Question    DR Ambar Jara  per your instructions I have taken my blood pressure daily in the afternoon for :15to:20  Resting - 2 feet on the floor - Smooth music

## 2019-02-11 DIAGNOSIS — I10 ESSENTIAL HYPERTENSION: ICD-10-CM

## 2019-02-11 DIAGNOSIS — E78.2 MIXED HYPERLIPIDEMIA: ICD-10-CM

## 2019-02-11 RX ORDER — PRAVASTATIN SODIUM 40 MG
TABLET ORAL
Qty: 90 TABLET | Refills: 0 | Status: SHIPPED | OUTPATIENT
Start: 2019-02-11 | End: 2019-06-15

## 2019-02-11 RX ORDER — LOSARTAN POTASSIUM AND HYDROCHLOROTHIAZIDE 25; 100 MG/1; MG/1
1 TABLET ORAL DAILY
Qty: 90 TABLET | Refills: 0 | Status: SHIPPED | OUTPATIENT
Start: 2019-02-11 | End: 2019-03-21

## 2019-02-12 ENCOUNTER — OFFICE VISIT (OUTPATIENT)
Dept: INTERNAL MEDICINE CLINIC | Facility: CLINIC | Age: 84
End: 2019-02-12
Payer: MEDICARE

## 2019-02-12 VITALS
BODY MASS INDEX: 32.59 KG/M2 | OXYGEN SATURATION: 96 % | WEIGHT: 217.5 LBS | RESPIRATION RATE: 16 BRPM | DIASTOLIC BLOOD PRESSURE: 70 MMHG | HEART RATE: 80 BPM | HEIGHT: 68.5 IN | SYSTOLIC BLOOD PRESSURE: 126 MMHG | TEMPERATURE: 98 F

## 2019-02-12 DIAGNOSIS — I87.2 VENOUS INSUFFICIENCY OF BOTH LOWER EXTREMITIES: Primary | ICD-10-CM

## 2019-02-12 DIAGNOSIS — E11.42 CONTROLLED TYPE 2 DIABETES MELLITUS WITH DIABETIC POLYNEUROPATHY, WITHOUT LONG-TERM CURRENT USE OF INSULIN (HCC): ICD-10-CM

## 2019-02-12 DIAGNOSIS — I48.91 ATRIAL FIBRILLATION, UNSPECIFIED TYPE (HCC): ICD-10-CM

## 2019-02-12 DIAGNOSIS — I10 ESSENTIAL HYPERTENSION: ICD-10-CM

## 2019-02-12 PROCEDURE — 99214 OFFICE O/P EST MOD 30 MIN: CPT | Performed by: INTERNAL MEDICINE

## 2019-02-12 RX ORDER — ACETAMINOPHEN 160 MG
2000 TABLET,DISINTEGRATING ORAL DAILY
COMMUNITY

## 2019-02-12 NOTE — PATIENT INSTRUCTIONS
Please have your fasting labs completed as soon as possbile. You will also need to give a urine sample at that time. THe orders were placed on 11/19/2018.

## 2019-02-12 NOTE — PROGRESS NOTES
Austin Xiao is a 80year old male. HPI:   Patient presents for the following issues. 1. HTN and new LE swelling: we started norvasc at the end of 11/2018 and had mild swelling on it but it was controlling his HTN so he wanted to continue it.  Home Past Medical History:   Diagnosis Date   • Arrhythmia     HX AFIB DX 3 YRS AGO   • Arthritis    • Atrial fibrillation (HCC)    • Esophageal reflux    • HIGH BLOOD PRESSURE    • HIGH CHOLESTEROL    • Hypothyroid    • Obstructive sleep apnea (adult) (pedia Isabela Bacon MD at [address]   • SI JOINT INJECTION Right 5/19/2015    Performed by Isabela Bacon MD at [facility] History:    Social History    Tobacco Use      Smoking status: Former Smoker now.   # Lumbar Spinal Stenosis s/p L3-5 fusion (8/28/14) with neurogenic claudication: chronic, pain is very well controlled on tramadol.  - he did get another opinion from Dr. Shalini Cotton (surgeon) who also agrees with conservative therapy.     - again discusse Maintenance: medicare wellness exam 4/27/2018  Colon Cancer Screening: colonoscopy in 3/2014 with hyperplastic and adenomatous polyps. He declines repeating a colonoscopy. FIT testing negative in 2018. Bone Health: Does not like dairy.  Educated on [employer]

## 2019-02-14 ENCOUNTER — TELEPHONE (OUTPATIENT)
Dept: INTERNAL MEDICINE CLINIC | Facility: CLINIC | Age: 84
End: 2019-02-14

## 2019-02-14 NOTE — TELEPHONE ENCOUNTER
Brandon Riley MD  [facility] Clinical Staff             I sent him 2 [employer] messages regarding a change in his blood pressure regimen. Please call and make sure he understands the followin. STOP amlodipine (norvasc) and potassium supplement.    2. STAR

## 2019-02-22 ENCOUNTER — LAB ENCOUNTER (OUTPATIENT)
Dept: LAB | Age: 84
End: 2019-02-22
Attending: INTERNAL MEDICINE
Payer: MEDICARE

## 2019-02-22 DIAGNOSIS — E11.9 CONTROLLED TYPE 2 DIABETES MELLITUS WITHOUT COMPLICATION, WITHOUT LONG-TERM CURRENT USE OF INSULIN (HCC): ICD-10-CM

## 2019-02-22 DIAGNOSIS — E11.42 CONTROLLED TYPE 2 DIABETES MELLITUS WITH DIABETIC POLYNEUROPATHY, WITHOUT LONG-TERM CURRENT USE OF INSULIN (HCC): ICD-10-CM

## 2019-02-22 DIAGNOSIS — I10 ESSENTIAL HYPERTENSION: ICD-10-CM

## 2019-02-22 DIAGNOSIS — E03.9 ACQUIRED HYPOTHYROIDISM: ICD-10-CM

## 2019-02-22 DIAGNOSIS — Z00.00 ROUTINE GENERAL MEDICAL EXAMINATION AT A HEALTH CARE FACILITY: ICD-10-CM

## 2019-02-22 DIAGNOSIS — E03.9 HYPOTHYROIDISM, UNSPECIFIED TYPE: ICD-10-CM

## 2019-02-22 DIAGNOSIS — I48.91 ATRIAL FIBRILLATION, UNSPECIFIED TYPE (HCC): ICD-10-CM

## 2019-02-22 DIAGNOSIS — R26.89 NEED FOR ASSISTANCE DUE TO UNSTEADY GAIT: ICD-10-CM

## 2019-02-22 DIAGNOSIS — M48.062 SPINAL STENOSIS OF LUMBAR REGION WITH NEUROGENIC CLAUDICATION: ICD-10-CM

## 2019-02-22 DIAGNOSIS — E78.2 MIXED HYPERLIPIDEMIA: ICD-10-CM

## 2019-02-22 LAB
ALT SERPL-CCNC: 34 U/L (ref 16–61)
ANION GAP SERPL CALC-SCNC: 7 MMOL/L (ref 0–18)
AST SERPL-CCNC: 28 U/L (ref 15–37)
BASOPHILS # BLD AUTO: 0.03 X10(3) UL (ref 0–0.2)
BASOPHILS NFR BLD AUTO: 0.5 %
BUN BLD-MCNC: 17 MG/DL (ref 7–18)
BUN/CREAT SERPL: 17.9 (ref 10–20)
CALCIUM BLD-MCNC: 9.1 MG/DL (ref 8.5–10.1)
CHLORIDE SERPL-SCNC: 104 MMOL/L (ref 98–107)
CHOLEST SMN-MCNC: 166 MG/DL (ref ?–200)
CO2 SERPL-SCNC: 31 MMOL/L (ref 21–32)
CREAT BLD-MCNC: 0.95 MG/DL (ref 0.7–1.3)
CREAT UR-SCNC: 90.6 MG/DL
DEPRECATED RDW RBC AUTO: 48.2 FL (ref 35.1–46.3)
EOSINOPHIL # BLD AUTO: 0.15 X10(3) UL (ref 0–0.7)
EOSINOPHIL NFR BLD AUTO: 2.3 %
ERYTHROCYTE [DISTWIDTH] IN BLOOD BY AUTOMATED COUNT: 13.9 % (ref 11–15)
EST. AVERAGE GLUCOSE BLD GHB EST-MCNC: 160 MG/DL (ref 68–126)
GLUCOSE BLD-MCNC: 145 MG/DL (ref 70–99)
HBA1C MFR BLD HPLC: 7.2 % (ref ?–5.7)
HCT VFR BLD AUTO: 38.4 % (ref 39–53)
HDLC SERPL-MCNC: 41 MG/DL (ref 40–59)
HGB BLD-MCNC: 12.5 G/DL (ref 13–17.5)
IMM GRANULOCYTES # BLD AUTO: 0.05 X10(3) UL (ref 0–1)
IMM GRANULOCYTES NFR BLD: 0.8 %
LDLC SERPL CALC-MCNC: 80 MG/DL (ref ?–100)
LYMPHOCYTES # BLD AUTO: 1.22 X10(3) UL (ref 1–4)
LYMPHOCYTES NFR BLD AUTO: 18.4 %
MCH RBC QN AUTO: 30.8 PG (ref 26–34)
MCHC RBC AUTO-ENTMCNC: 32.6 G/DL (ref 31–37)
MCV RBC AUTO: 94.6 FL (ref 80–100)
MICROALBUMIN UR-MCNC: <0.5 MG/DL
MONOCYTES # BLD AUTO: 0.63 X10(3) UL (ref 0.1–1)
MONOCYTES NFR BLD AUTO: 9.5 %
NEUTROPHILS # BLD AUTO: 4.55 X10 (3) UL (ref 1.5–7.7)
NEUTROPHILS # BLD AUTO: 4.55 X10(3) UL (ref 1.5–7.7)
NEUTROPHILS NFR BLD AUTO: 68.5 %
NONHDLC SERPL-MCNC: 125 MG/DL (ref ?–130)
OSMOLALITY SERPL CALC.SUM OF ELEC: 298 MOSM/KG (ref 275–295)
PLATELET # BLD AUTO: 224 10(3)UL (ref 150–450)
POTASSIUM SERPL-SCNC: 4.1 MMOL/L (ref 3.5–5.1)
RBC # BLD AUTO: 4.06 X10(6)UL (ref 3.8–5.8)
SODIUM SERPL-SCNC: 142 MMOL/L (ref 136–145)
T4 FREE SERPL-MCNC: 1 NG/DL (ref 0.8–1.7)
TRIGL SERPL-MCNC: 223 MG/DL (ref 30–149)
TSI SER-ACNC: 5.87 MIU/ML (ref 0.36–3.74)
VLDLC SERPL CALC-MCNC: 45 MG/DL (ref 0–30)
WBC # BLD AUTO: 6.6 X10(3) UL (ref 4–11)

## 2019-02-22 PROCEDURE — 80061 LIPID PANEL: CPT

## 2019-02-22 PROCEDURE — 36415 COLL VENOUS BLD VENIPUNCTURE: CPT

## 2019-02-22 PROCEDURE — 84439 ASSAY OF FREE THYROXINE: CPT

## 2019-02-22 PROCEDURE — 80048 BASIC METABOLIC PNL TOTAL CA: CPT

## 2019-02-22 PROCEDURE — 85025 COMPLETE CBC W/AUTO DIFF WBC: CPT

## 2019-02-22 PROCEDURE — 83036 HEMOGLOBIN GLYCOSYLATED A1C: CPT

## 2019-02-22 PROCEDURE — 82570 ASSAY OF URINE CREATININE: CPT

## 2019-02-22 PROCEDURE — 84450 TRANSFERASE (AST) (SGOT): CPT

## 2019-02-22 PROCEDURE — 84443 ASSAY THYROID STIM HORMONE: CPT

## 2019-02-22 PROCEDURE — 84460 ALANINE AMINO (ALT) (SGPT): CPT

## 2019-02-22 PROCEDURE — 82043 UR ALBUMIN QUANTITATIVE: CPT

## 2019-02-25 RX ORDER — LEVOTHYROXINE SODIUM 88 UG/1
TABLET ORAL
Qty: 90 TABLET | Refills: 1 | OUTPATIENT
Start: 2019-02-25

## 2019-02-25 NOTE — TELEPHONE ENCOUNTER
Refill requested:   Requested Prescriptions     Pending Prescriptions Disp Refills   • LEVOTHYROXINE SODIUM 88 MCG Oral Tab [Pharmacy Med Name: LEVOTHYROXINE 0.088MG (88MCG) TAB] 90 tablet 1     Sig: TAKE 1 TABLET(88 MCG) BY MOUTH BEFORE BREAKFAST       Fa

## 2019-02-25 NOTE — TELEPHONE ENCOUNTER
We have to make sure the new dose of levothyroxine 88 mcg is effective. So I can only send 30 tablets with 1 refill to local pharmacy. He then needs to repeat his TSH in 6 weeks. 90 day supply is inappropriate.

## 2019-03-04 ENCOUNTER — TELEPHONE (OUTPATIENT)
Dept: INTERNAL MEDICINE CLINIC | Facility: CLINIC | Age: 84
End: 2019-03-04

## 2019-03-04 NOTE — TELEPHONE ENCOUNTER
Patient called responding to triage nurse call from Feb 14, 2019 regarding questions about his medications.   Patient scheduled his wellness for Medicare in April and still would like a call from nurse to discuss swelling in his legs and he is not sure why?

## 2019-03-06 ENCOUNTER — OFFICE VISIT (OUTPATIENT)
Dept: INTERNAL MEDICINE CLINIC | Facility: CLINIC | Age: 84
End: 2019-03-06
Payer: MEDICARE

## 2019-03-06 VITALS
RESPIRATION RATE: 12 BRPM | DIASTOLIC BLOOD PRESSURE: 60 MMHG | HEIGHT: 68.5 IN | WEIGHT: 209.25 LBS | HEART RATE: 72 BPM | SYSTOLIC BLOOD PRESSURE: 114 MMHG | TEMPERATURE: 97 F | BODY MASS INDEX: 31.35 KG/M2

## 2019-03-06 DIAGNOSIS — E03.9 ACQUIRED HYPOTHYROIDISM: ICD-10-CM

## 2019-03-06 DIAGNOSIS — E78.2 MIXED HYPERLIPIDEMIA: ICD-10-CM

## 2019-03-06 DIAGNOSIS — I48.91 ATRIAL FIBRILLATION, UNSPECIFIED TYPE (HCC): ICD-10-CM

## 2019-03-06 DIAGNOSIS — I10 ESSENTIAL HYPERTENSION: ICD-10-CM

## 2019-03-06 DIAGNOSIS — R22.43 LOCALIZED SWELLING OF BOTH LOWER LEGS: Primary | ICD-10-CM

## 2019-03-06 PROCEDURE — 99214 OFFICE O/P EST MOD 30 MIN: CPT | Performed by: INTERNAL MEDICINE

## 2019-03-06 NOTE — PROGRESS NOTES
Lorenza Mistry is a 80year old male. HPI:   Patient presents with: Follow - Up: HTN + bilateral lower leg swelling   Patient presents for follow up on several issues. Hypertension - high in the morning before he takes his medications.   By the after MetFORMIN HCl 500 MG Oral Tab, TAKE 1 TABLET(500 MG) BY MOUTH TWICE DAILY WITH MEALS, Disp: 180 tablet, Rfl: 1  •  GLIPIZIDE 5 MG Oral Tab, TAKE 1 TABLET(5 MG) BY MOUTH EVERY MORNING BEFORE BREAKFAST, Disp: 90 tablet, Rfl: 0  •  TraMADol HCl 50 MG Oral Tab laminectomy,lumbar (10/2003); hip replacement surgery (8/1/2005, 8/15/2008); back surgery (20004); back surgery (8/28/14); dil urethra stric,male,gen anesth (8/28/14); other surgical history (4/2013); other surgical history (8/28/2014);  PIRIFORMIS/SCIATIC previous notes. Switching off amlodipine has not helped symptoms much. Asymmetrical swelling as right leg more swollen than right - however, no calf tenderness, DVT seems less likely as he is chronically anticoagulated on Coumadin for afib.   Regardless,

## 2019-03-06 NOTE — PATIENT INSTRUCTIONS
- Blood pressure looks good. We will continue your current regimen.   - We will check an ultrasound of your legs to rule out blood clots in the leg as explanation of swelling  - If there are no blood clots, we may refer you to a vein specialist.    - Your

## 2019-03-12 ENCOUNTER — HOSPITAL ENCOUNTER (OUTPATIENT)
Dept: ULTRASOUND IMAGING | Age: 84
Discharge: HOME | End: 2019-03-12
Attending: INTERNAL MEDICINE
Payer: MEDICARE

## 2019-03-12 ENCOUNTER — PATIENT MESSAGE (OUTPATIENT)
Dept: INTERNAL MEDICINE CLINIC | Facility: CLINIC | Age: 84
End: 2019-03-12

## 2019-03-12 DIAGNOSIS — R22.43 LOCALIZED SWELLING OF BOTH LOWER LEGS: ICD-10-CM

## 2019-03-12 DIAGNOSIS — E03.9 ACQUIRED HYPOTHYROIDISM: Primary | ICD-10-CM

## 2019-03-12 PROCEDURE — 93970 EXTREMITY STUDY: CPT | Performed by: INTERNAL MEDICINE

## 2019-03-15 ENCOUNTER — HOSPITAL ENCOUNTER (OUTPATIENT)
Facility: HOSPITAL | Age: 84
Setting detail: OBSERVATION
Discharge: HOME | End: 2019-03-17
Attending: EMERGENCY MEDICINE | Admitting: INTERNAL MEDICINE
Payer: MEDICARE

## 2019-03-15 ENCOUNTER — APPOINTMENT (OUTPATIENT)
Dept: GENERAL RADIOLOGY | Facility: HOSPITAL | Age: 84
End: 2019-03-15
Payer: MEDICARE

## 2019-03-15 ENCOUNTER — APPOINTMENT (OUTPATIENT)
Dept: GENERAL RADIOLOGY | Facility: HOSPITAL | Age: 84
End: 2019-03-15
Attending: NURSE PRACTITIONER
Payer: MEDICARE

## 2019-03-15 DIAGNOSIS — R05.9 COUGH: ICD-10-CM

## 2019-03-15 DIAGNOSIS — B34.9 VIRAL SYNDROME: Primary | ICD-10-CM

## 2019-03-15 DIAGNOSIS — R53.1 WEAKNESS GENERALIZED: ICD-10-CM

## 2019-03-15 PROBLEM — R50.9 FEVER: Status: ACTIVE | Noted: 2019-03-15

## 2019-03-15 PROBLEM — E87.1 HYPONATREMIA: Status: ACTIVE | Noted: 2019-03-15

## 2019-03-15 PROBLEM — R73.9 HYPERGLYCEMIA: Status: ACTIVE | Noted: 2019-03-15

## 2019-03-15 PROCEDURE — 99220 INITIAL OBSERVATION CARE,LEVL III: CPT | Performed by: INTERNAL MEDICINE

## 2019-03-15 PROCEDURE — 71046 X-RAY EXAM CHEST 2 VIEWS: CPT | Performed by: NURSE PRACTITIONER

## 2019-03-15 RX ORDER — SPIRONOLACTONE 25 MG/1
25 TABLET ORAL DAILY
Status: DISCONTINUED | OUTPATIENT
Start: 2019-03-15 | End: 2019-03-17

## 2019-03-15 RX ORDER — SODIUM CHLORIDE 9 MG/ML
INJECTION, SOLUTION INTRAVENOUS CONTINUOUS
Status: DISCONTINUED | OUTPATIENT
Start: 2019-03-15 | End: 2019-03-17

## 2019-03-15 RX ORDER — ENOXAPARIN SODIUM 100 MG/ML
40 INJECTION SUBCUTANEOUS DAILY
Status: DISCONTINUED | OUTPATIENT
Start: 2019-03-15 | End: 2019-03-15

## 2019-03-15 RX ORDER — WARFARIN SODIUM 5 MG/1
5 TABLET ORAL
Status: DISCONTINUED | OUTPATIENT
Start: 2019-03-16 | End: 2019-03-16

## 2019-03-15 RX ORDER — PANTOPRAZOLE SODIUM 20 MG/1
20 TABLET, DELAYED RELEASE ORAL
Status: DISCONTINUED | OUTPATIENT
Start: 2019-03-16 | End: 2019-03-17

## 2019-03-15 RX ORDER — ACETAMINOPHEN 500 MG
1000 TABLET ORAL ONCE
Status: COMPLETED | OUTPATIENT
Start: 2019-03-15 | End: 2019-03-15

## 2019-03-15 RX ORDER — ACETAMINOPHEN 325 MG/1
650 TABLET ORAL EVERY 6 HOURS PRN
Status: DISCONTINUED | OUTPATIENT
Start: 2019-03-15 | End: 2019-03-17

## 2019-03-15 RX ORDER — WARFARIN SODIUM 7.5 MG/1
7.5 TABLET ORAL
Status: DISCONTINUED | OUTPATIENT
Start: 2019-03-15 | End: 2019-03-16

## 2019-03-15 RX ORDER — LEVOTHYROXINE SODIUM 88 UG/1
88 TABLET ORAL
Status: DISCONTINUED | OUTPATIENT
Start: 2019-03-15 | End: 2019-03-17

## 2019-03-15 RX ORDER — DEXTROSE MONOHYDRATE 25 G/50ML
50 INJECTION, SOLUTION INTRAVENOUS
Status: DISCONTINUED | OUTPATIENT
Start: 2019-03-15 | End: 2019-03-17

## 2019-03-15 RX ORDER — ONDANSETRON 2 MG/ML
4 INJECTION INTRAMUSCULAR; INTRAVENOUS EVERY 6 HOURS PRN
Status: DISCONTINUED | OUTPATIENT
Start: 2019-03-15 | End: 2019-03-17

## 2019-03-15 RX ORDER — FINASTERIDE 5 MG/1
5 TABLET, FILM COATED ORAL
Status: DISCONTINUED | OUTPATIENT
Start: 2019-03-16 | End: 2019-03-17

## 2019-03-15 RX ORDER — OSELTAMIVIR PHOSPHATE 30 MG/1
30 CAPSULE ORAL EVERY 12 HOURS SCHEDULED
Status: DISCONTINUED | OUTPATIENT
Start: 2019-03-15 | End: 2019-03-17

## 2019-03-15 RX ORDER — ALFUZOSIN HYDROCHLORIDE 10 MG/1
10 TABLET, EXTENDED RELEASE ORAL
Status: DISCONTINUED | OUTPATIENT
Start: 2019-03-16 | End: 2019-03-17

## 2019-03-15 RX ORDER — DOCUSATE SODIUM 100 MG/1
100 CAPSULE, LIQUID FILLED ORAL 2 TIMES DAILY PRN
Status: DISCONTINUED | OUTPATIENT
Start: 2019-03-15 | End: 2019-03-17

## 2019-03-15 RX ORDER — TRAMADOL HYDROCHLORIDE 50 MG/1
50 TABLET ORAL 2 TIMES DAILY PRN
Status: DISCONTINUED | OUTPATIENT
Start: 2019-03-15 | End: 2019-03-17

## 2019-03-15 NOTE — H&P
[facility]                                                               History & Physical         Josue Cisneros Patient Status:  Emergency    3/22/1935 MRN [id number]   Location [address] [facility] Attending Elias Belle MD at [facility] MAIN OR   [facility]  [facility]    L3-L4   • LUMBAR EPIDURAL N/A 4/27/2015    Performed by Candice Harvey MD at [address] [place] [address] N/A 4/7/2015    Performed by Becka Rain MD at [address] [place] [address] Temporal, resp. rate 22, height 5' 10\" (1.778 m), weight 219 lb (99.3 kg), SpO2 93 %. General: No acute distress. HEENT: Moist mucous membranes. EOM-I. PERRL. Nasal congestion present  Neck: No lymphadenopathy. No JVD. No carotid bruits.   Respiratory consolidation. CARDIAC:  Normal size cardiac silhouette. MEDIASTINUM:  Normal.  PLEURA:  No pneumothorax. No pleural effusions. BONES:  Normal for age.     =====  CONCLUSION:  No acute cardiopulmonary process.            Dictated by: Esvin Caruso MD o

## 2019-03-15 NOTE — ED INITIAL ASSESSMENT (HPI)
Patient brought in via EMS. Patient complaining of generalized weakness, fatigue, and dry cough that started last night. Denies chest pain or dyspnea. Unknown if fevers at home. Denies falls.

## 2019-03-16 PROBLEM — J10.1 INFLUENZA A (H1N1): Status: ACTIVE | Noted: 2019-03-16

## 2019-03-16 PROCEDURE — 99225 SUBSEQUENT OBSERVATION CARE: CPT | Performed by: HOSPITALIST

## 2019-03-16 RX ORDER — MAGNESIUM OXIDE 400 MG (241.3 MG MAGNESIUM) TABLET
400 TABLET ONCE
Status: COMPLETED | OUTPATIENT
Start: 2019-03-16 | End: 2019-03-16

## 2019-03-16 RX ORDER — WARFARIN SODIUM 7.5 MG/1
7.5 TABLET ORAL
Status: COMPLETED | OUTPATIENT
Start: 2019-03-16 | End: 2019-03-16

## 2019-03-16 RX ORDER — TRAZODONE HYDROCHLORIDE 50 MG/1
50 TABLET ORAL NIGHTLY PRN
Status: DISCONTINUED | OUTPATIENT
Start: 2019-03-16 | End: 2019-03-17

## 2019-03-16 RX ORDER — POTASSIUM CHLORIDE 20 MEQ/1
40 TABLET, EXTENDED RELEASE ORAL EVERY 4 HOURS
Status: COMPLETED | OUTPATIENT
Start: 2019-03-16 | End: 2019-03-16

## 2019-03-16 NOTE — ED PROVIDER NOTES
Patient Seen in: [facility] 3nw-a    History   Patient presents with:  Cough/URI  Fatigue (constitutional, neurologic)    Stated Complaint: generalized weakness and cough    80-year-old male who presents to the emergency room with complaints of gene ENDOSCOPY   • DIL URETHRA STRIC,MALE,GEN ANESTH  8/28/14    EDW, JESUSITA   • HIP REPLACEMENT SURGERY  8/1/2005, 8/15/2008    L in 2005, R in 2008   • Stefan 38 N/A 8/28/2014    Performed by Stevan Fowler MD at [address] [address] Negative. Gastrointestinal: Negative. Skin: Negative. Neurological: Positive for weakness. Hematological: Negative. Psychiatric/Behavioral: Negative. All other systems reviewed and are negative.       Positive for stated complaint: generali All other components within normal limits   PTT, ACTIVATED - Abnormal; Notable for the following components:    PTT 41.0 (*)     All other components within normal limits   RESPIRATORY PANEL FLU EXPANDED - Abnormal; Notable for the following components: Normal size cardiac silhouette. MEDIASTINUM:  Normal. PLEURA:  No pneumothorax. No pleural effusions. BONES:  Normal for age. CONCLUSION:  No acute cardiopulmonary process.     Dictated by: Bee Molina MD on 3/15/2019 at 17:42     Approved by: Kevon Oliveira Reviewed: 3/7/2019          ICD-10-CM Noted POA    * (Principal) Viral syndrome B34.9 3/15/2019 Unknown    Cough R05 3/15/2019     Fever R50.9 3/15/2019 Unknown    Hyperglycemia R73.9 3/15/2019 Yes    Hyponatremia E87.1 3/15/2019 Yes    Weakness generalize

## 2019-03-16 NOTE — RESPIRATORY THERAPY NOTE
Pt is on KRISTIAN protocol and does not wear cpap at home.  Will continue to monitor pt with pulse ox at night

## 2019-03-16 NOTE — PROGRESS NOTES
[facility]  Progress Note     Gianna Quick Patient Status:  Observation    3/22/1935 MRN [id number]   [facility] 3NW-A Attending Cindi Bender [address] Day # 0 PCP Navjot Adame MD     Chief Complaint: weakness    S: Shahnaz Cha TID AC and HS   • Oseltamivir Phosphate  30 mg Oral Q12H   • finasteride  5 mg Oral Daily   • Levothyroxine Sodium  88 mcg Oral Before breakfast   • losartan-hydrochlorothiazide (HYZAAR 100/12. 5) combination tablet ([facility] only)   Oral Daily   • Pantoprazole

## 2019-03-17 VITALS
BODY MASS INDEX: 32.21 KG/M2 | WEIGHT: 225 LBS | TEMPERATURE: 98 F | HEART RATE: 73 BPM | OXYGEN SATURATION: 96 % | RESPIRATION RATE: 18 BRPM | DIASTOLIC BLOOD PRESSURE: 75 MMHG | HEIGHT: 70 IN | SYSTOLIC BLOOD PRESSURE: 132 MMHG

## 2019-03-17 PROCEDURE — 99217 OBSERVATION CARE DISCHARGE: CPT | Performed by: HOSPITALIST

## 2019-03-17 RX ORDER — OSELTAMIVIR PHOSPHATE 30 MG/1
30 CAPSULE ORAL EVERY 12 HOURS SCHEDULED
Qty: 6 CAPSULE | Refills: 0 | Status: ON HOLD | OUTPATIENT
Start: 2019-03-17 | End: 2019-03-20

## 2019-03-17 RX ORDER — MAGNESIUM OXIDE 400 MG (241.3 MG MAGNESIUM) TABLET
400 TABLET ONCE
Status: COMPLETED | OUTPATIENT
Start: 2019-03-17 | End: 2019-03-17

## 2019-03-17 RX ORDER — IPRATROPIUM BROMIDE AND ALBUTEROL SULFATE 2.5; .5 MG/3ML; MG/3ML
3 SOLUTION RESPIRATORY (INHALATION) EVERY 6 HOURS PRN
Status: DISCONTINUED | OUTPATIENT
Start: 2019-03-17 | End: 2019-03-17

## 2019-03-17 RX ORDER — IPRATROPIUM BROMIDE AND ALBUTEROL SULFATE 2.5; .5 MG/3ML; MG/3ML
3 SOLUTION RESPIRATORY (INHALATION)
Status: DISCONTINUED | OUTPATIENT
Start: 2019-03-17 | End: 2019-03-17

## 2019-03-17 NOTE — PHYSICAL THERAPY NOTE
PHYSICAL THERAPY QUICK EVALUATION - INPATIENT    Room Number: 318/318-A  Evaluation Date: 3/17/2019  Presenting Problem: + influenza  Physician Order: PT Eval and Treat    Problem List  Principal Problem:    Influenza A (H1N1)  Active Problems:    Hypoth HISTORY  4/2013    right lumbar facet steroid injection    • OTHER SURGICAL HISTORY  8/28/2014    L3-5 post LIF   • PIRIFORMIS/SCIATIC NERVE INJECTION Right 8/24/2015    Performed by Lawson Mena MD at [address]   • PIRIFORMIS/SCI NEUROLOGICAL FINDINGS                      ACTIVITY TOLERANCE                         O2 WALK                  AM-PAC '6-Clicks' INPATIENT SHORT FORM - BASIC MOBILITY  How much difficulty does the patient currently have. ..  -   Turning over in bed (inc and presents with no skilled Physical Therapy needs at this time. Patient discharged from Physical Therapy services. Please re-order if a new functional limitation presents during this admission.     GOALS  Patient was able to achieve the following goals

## 2019-03-17 NOTE — PROGRESS NOTES
[facility]  Progress Note     Barrera Dense Patient Status:  Observation    3/22/1935 MRN [id number]   [facility] 3NW-A Attending Chasity Rounds [address] Day # 0 PCP Karishma Reyes MD     Chief Complaint: weakness    S: Naeem Oliveros Estimated Creatinine Clearance: 61.5 mL/min (based on SCr of 0.94 mg/dL).     Recent Labs   Lab  03/15/19   1713  03/16/19   0628   PTP  19.6*  17.7*   INR  1.57*  1.38*       Recent Labs   Lab  03/15/19   1713   TROP  <0.045            Imaging: Imagi to: Home    Plan of care discussed with pt at bedside    Kirti Otoole DO

## 2019-03-17 NOTE — PLAN OF CARE
Pt is a&o x4. VSS and afebrile. O2 sats WNL on 2 L O2 via nasal cannula. Expiratory wheezes and rales present on auscultation of lungs bilaterally. Droplet precautions maintained. RT called to provide neb tx, wheezing improved but still present.  Abdomen is

## 2019-03-18 ENCOUNTER — HOSPITAL ENCOUNTER (OUTPATIENT)
Facility: HOSPITAL | Age: 84
Setting detail: OBSERVATION
Discharge: HOME | End: 2019-03-20
Attending: EMERGENCY MEDICINE | Admitting: HOSPITALIST
Payer: MEDICARE

## 2019-03-18 ENCOUNTER — APPOINTMENT (OUTPATIENT)
Dept: GENERAL RADIOLOGY | Facility: HOSPITAL | Age: 84
End: 2019-03-18
Attending: EMERGENCY MEDICINE
Payer: MEDICARE

## 2019-03-18 ENCOUNTER — TELEPHONE (OUTPATIENT)
Dept: INTERNAL MEDICINE CLINIC | Facility: CLINIC | Age: 84
End: 2019-03-18

## 2019-03-18 ENCOUNTER — PATIENT MESSAGE (OUTPATIENT)
Dept: INTERNAL MEDICINE CLINIC | Facility: CLINIC | Age: 84
End: 2019-03-18

## 2019-03-18 DIAGNOSIS — J98.01 ACUTE BRONCHOSPASM: Primary | ICD-10-CM

## 2019-03-18 PROCEDURE — 99220 INITIAL OBSERVATION CARE,LEVL III: CPT | Performed by: HOSPITALIST

## 2019-03-18 PROCEDURE — 71045 X-RAY EXAM CHEST 1 VIEW: CPT | Performed by: EMERGENCY MEDICINE

## 2019-03-18 RX ORDER — OSELTAMIVIR PHOSPHATE 75 MG/1
75 CAPSULE ORAL EVERY 12 HOURS SCHEDULED
Status: DISCONTINUED | OUTPATIENT
Start: 2019-03-18 | End: 2019-03-20

## 2019-03-18 RX ORDER — ALFUZOSIN HYDROCHLORIDE 10 MG/1
10 TABLET, EXTENDED RELEASE ORAL
Status: DISCONTINUED | OUTPATIENT
Start: 2019-03-18 | End: 2019-03-20

## 2019-03-18 RX ORDER — SPIRONOLACTONE 25 MG/1
25 TABLET ORAL DAILY
Status: DISCONTINUED | OUTPATIENT
Start: 2019-03-18 | End: 2019-03-20

## 2019-03-18 RX ORDER — METHYLPREDNISOLONE SODIUM SUCCINATE 40 MG/ML
40 INJECTION, POWDER, LYOPHILIZED, FOR SOLUTION INTRAMUSCULAR; INTRAVENOUS EVERY 12 HOURS
Status: DISCONTINUED | OUTPATIENT
Start: 2019-03-18 | End: 2019-03-19

## 2019-03-18 RX ORDER — ONDANSETRON 2 MG/ML
4 INJECTION INTRAMUSCULAR; INTRAVENOUS EVERY 6 HOURS PRN
Status: DISCONTINUED | OUTPATIENT
Start: 2019-03-18 | End: 2019-03-20

## 2019-03-18 RX ORDER — IPRATROPIUM BROMIDE AND ALBUTEROL SULFATE 2.5; .5 MG/3ML; MG/3ML
3 SOLUTION RESPIRATORY (INHALATION)
Status: DISCONTINUED | OUTPATIENT
Start: 2019-03-18 | End: 2019-03-20

## 2019-03-18 RX ORDER — AMLODIPINE BESYLATE 5 MG/1
5 TABLET ORAL DAILY
COMMUNITY
End: 2019-03-27

## 2019-03-18 RX ORDER — PANTOPRAZOLE SODIUM 20 MG/1
20 TABLET, DELAYED RELEASE ORAL
Status: DISCONTINUED | OUTPATIENT
Start: 2019-03-18 | End: 2019-03-20

## 2019-03-18 RX ORDER — METHYLPREDNISOLONE SODIUM SUCCINATE 125 MG/2ML
80 INJECTION, POWDER, LYOPHILIZED, FOR SOLUTION INTRAMUSCULAR; INTRAVENOUS EVERY 8 HOURS
Status: DISCONTINUED | OUTPATIENT
Start: 2019-03-18 | End: 2019-03-18

## 2019-03-18 RX ORDER — WARFARIN SODIUM 7.5 MG/1
7.5 TABLET ORAL
Status: DISCONTINUED | OUTPATIENT
Start: 2019-03-19 | End: 2019-03-20

## 2019-03-18 RX ORDER — TRAMADOL HYDROCHLORIDE 50 MG/1
50 TABLET ORAL 2 TIMES DAILY PRN
Status: DISCONTINUED | OUTPATIENT
Start: 2019-03-18 | End: 2019-03-20

## 2019-03-18 RX ORDER — WARFARIN SODIUM 5 MG/1
5 TABLET ORAL
Status: DISCONTINUED | OUTPATIENT
Start: 2019-03-18 | End: 2019-03-20

## 2019-03-18 RX ORDER — LEVOTHYROXINE SODIUM 88 UG/1
88 TABLET ORAL
Status: DISCONTINUED | OUTPATIENT
Start: 2019-03-18 | End: 2019-03-20

## 2019-03-18 RX ORDER — GARLIC EXTRACT 500 MG
1 CAPSULE ORAL DAILY
Status: DISCONTINUED | OUTPATIENT
Start: 2019-03-18 | End: 2019-03-20

## 2019-03-18 RX ORDER — METOCLOPRAMIDE HYDROCHLORIDE 5 MG/ML
10 INJECTION INTRAMUSCULAR; INTRAVENOUS EVERY 8 HOURS PRN
Status: DISCONTINUED | OUTPATIENT
Start: 2019-03-18 | End: 2019-03-20

## 2019-03-18 RX ORDER — IPRATROPIUM BROMIDE AND ALBUTEROL SULFATE 2.5; .5 MG/3ML; MG/3ML
3 SOLUTION RESPIRATORY (INHALATION)
Status: DISCONTINUED | OUTPATIENT
Start: 2019-03-18 | End: 2019-03-18

## 2019-03-18 RX ORDER — METHYLPREDNISOLONE SODIUM SUCCINATE 125 MG/2ML
125 INJECTION, POWDER, LYOPHILIZED, FOR SOLUTION INTRAMUSCULAR; INTRAVENOUS ONCE
Status: COMPLETED | OUTPATIENT
Start: 2019-03-18 | End: 2019-03-18

## 2019-03-18 RX ORDER — FINASTERIDE 5 MG/1
5 TABLET, FILM COATED ORAL DAILY
Status: DISCONTINUED | OUTPATIENT
Start: 2019-03-18 | End: 2019-03-20

## 2019-03-18 RX ORDER — ATORVASTATIN CALCIUM 10 MG/1
10 TABLET, FILM COATED ORAL NIGHTLY
Status: DISCONTINUED | OUTPATIENT
Start: 2019-03-18 | End: 2019-03-20

## 2019-03-18 RX ORDER — IPRATROPIUM BROMIDE AND ALBUTEROL SULFATE 2.5; .5 MG/3ML; MG/3ML
3 SOLUTION RESPIRATORY (INHALATION) ONCE
Status: COMPLETED | OUTPATIENT
Start: 2019-03-18 | End: 2019-03-18

## 2019-03-18 RX ORDER — POTASSIUM CHLORIDE 750 MG/1
10 TABLET, EXTENDED RELEASE ORAL DAILY
COMMUNITY
End: 2020-01-16

## 2019-03-18 RX ORDER — ACETAMINOPHEN 325 MG/1
650 TABLET ORAL EVERY 6 HOURS PRN
Status: DISCONTINUED | OUTPATIENT
Start: 2019-03-18 | End: 2019-03-20

## 2019-03-18 NOTE — PROGRESS NOTES
Pharmacy Note:   [employer] approved dose adjustment for: Tamiflu    PEDRO Richard has been prescribed Tamiflu 30 mg twice daily. Estimated Creatinine Clearance: 67.4 mL/min (based on SCr of 0.83 mg/dL). Body mass index is 31.16 kg/m².  Wt Readings from [employer]

## 2019-03-18 NOTE — CM/SW NOTE
03/18/19 1600   CM/SW Screening   Referral Source    Information Source Chart review;Nursing rounds   Patient's Mental Status Alert;Oriented   Patient's [address]   Patient lives with Spouse   Patient Status Prior to Admission

## 2019-03-18 NOTE — PLAN OF CARE
Diabetes/Glucose Control    • Glucose maintained within prescribed range Progressing        Patient/Family Goals    • Patient/Family Long Term Goal Progressing    • Patient/Family Short Term Goal Progressing            Pt alert and oriented X4, [place].   Pt st

## 2019-03-18 NOTE — TELEPHONE ENCOUNTER
Pt calling from hospital bed. He was admitted last week and held till Sunday for observation and now is back in the hospital for observation. Calling to let Dr. Evelio Pratt know.

## 2019-03-18 NOTE — H&P
[facility]  History and Physical     Soledad Maxx Patient Status:  Observation    3/22/1935 MRN [id number]   [facility] 5NW-A Attending Henri Curry MD   Hosp Day # 0 PCP Med Pearson MD     Chief Complaint: SOB    History of 8/28/14    EDW, JESUSITA   • EYE SURGERY     • HIP REPLACEMENT SURGERY  8/1/2005, 8/15/2008    L in 2005, R in 2008   • Stefan 38 N/A 8/28/2014    Performed by Adele Jarquin MD at [facility] MAIN OR   • [address] [place] [address]  10/2003    L3-L4   • L No current facility-administered medications on file prior to encounter. Current Outpatient Medications on File Prior to Encounter:  Oseltamivir Phosphate 30 MG Oral Cap Take 1 capsule (30 mg total) by mouth every 12 (twelve) hours.  Disp: 6 capsule Rf Acidophilus/Pectin Oral Cap Take 1 capsule by mouth daily. Disp:  Rfl:    Multiple Vitamins-Minerals (PRESERVISION AREDS) Oral Tab Take 1 tablet by mouth 2 (two) times daily.    Disp:  Rfl:        Review of Systems:   A comprehensive 14 point review of sy --   80   AST  25   --    --   37   ALT  33   --    --   49   BILT  0.5   --    --   0.4   TP  7.4   --    --   6.8       Estimated Creatinine Clearance: 67.4 mL/min (based on SCr of 0.83 mg/dL).     Recent Labs   Lab  03/16/19   8152  03/17/19   0606  03/1

## 2019-03-18 NOTE — ED PROVIDER NOTES
Patient Seen in: [facility] Emergency Department    History   Patient presents with:  Hypertension (cardiovascular)  Dyspnea SHREE SOB (respiratory)    Stated Complaint: hypertension, SHREE, SOB    HPI      55-year-old gentleman comes in for evaluation OTHER SURGICAL HISTORY  8/28/2014    L3-5 post LIF   • PIRIFORMIS/SCIATIC NERVE INJECTION Right 8/24/2015    Performed by Candice Harvey MD at [address]   • PIRIFORMIS/SCIATIC NERVE INJECTION Right 4/7/2015    Performed by Little St[place], icterus. Neck: Normal range of motion. Neck supple. Cardiovascular: Normal rate and intact distal pulses. Pulmonary/Chest: Effort normal. No respiratory distress. He has decreased breath sounds. He has wheezes. Abdominal: Soft.  He exhibits no diste RAINBOW DRAW LIGHT GREEN   RAINBOW DRAW GOLD     EKG    Rate, intervals and axes as noted on EKG Report.   Rate: 96  Rhythm: Atrial Fibrillation  Reading: Atrial fibrillation, no ischemic changes              Chest X-ray negative for any acute disease pro

## 2019-03-18 NOTE — PROGRESS NOTES
NURSING ADMISSION NOTE      Patient  RECEIVED FROM THE ED  via Cart AND ADMITTED TO ROOM 519 WITH DX OF ACUTE BRONCHOSPASMS. Oriented to room. Safety precautions initiated. Bed in low position. Call light in reach. DROPLET PRECAUTIONS IN PLACE.

## 2019-03-18 NOTE — PROGRESS NOTES
UNABLE TO COMPLETE MED REC AT THE MOMENT. PATIENT NOT FAMILIAR WITH THE EXACT MEDS THAT HE TAKES. \"I HAVE A LIST OF THE MEDICATIONS THAT DR Janet Castellon SENT ME HOME WITH AT HOME, BUT I HAVE A PROBLEM WITH THAT LIST. I NEED TO SEE THE OUTPATIENT LIST.

## 2019-03-18 NOTE — PROGRESS NOTES
Multidisciplinary Discharge Rounds held 3/18/2019. Treatment team members present today include , , Charge Nurse,  Nurse, RT, PT and Pharmacy caring for [employer].      Other care providers present:    Patient Active Proble

## 2019-03-18 NOTE — ED INITIAL ASSESSMENT (HPI)
Pt here with high blood pressure starting tonight. Pt states recently admitted for flu. Pt also states shortness of breath.

## 2019-03-19 PROCEDURE — 99225 SUBSEQUENT OBSERVATION CARE: CPT | Performed by: HOSPITALIST

## 2019-03-19 RX ORDER — PREDNISONE 20 MG/1
40 TABLET ORAL
Status: DISCONTINUED | OUTPATIENT
Start: 2019-03-19 | End: 2019-03-20

## 2019-03-19 RX ORDER — AMLODIPINE BESYLATE 5 MG/1
5 TABLET ORAL DAILY
Status: DISCONTINUED | OUTPATIENT
Start: 2019-03-19 | End: 2019-03-20

## 2019-03-19 NOTE — HOME CARE LIAISON
Received referral from [place], [facility] Harrison Vasquez. Pt declining home health services at this time. Meenu updated.  Thank you for the referral.

## 2019-03-19 NOTE — PROGRESS NOTES
[facility]  Progress Note     Renae Leroy Patient Status:  Observation    3/22/1935 MRN [id number]   [facility] 5NW-A Attending Oswald Henry [address] Day # 0 PCP Ajay Narvaez MD     Chief Complaint: sob    S: Patient 134*  135*   --   139   K  3.7  3.6  4.1  3.9   CL  100  101   --   104   CO2  27.0  26.0   --   29.0   ALKPHO  79   --    --   80   AST  25   --    --   37   ALT  33   --    --   49   BILT  0.5   --    --   0.4   TP  7.4   --    --   6.8       Estimated C clinical improvement  At this point Mr. Ethan Winter is expected to be discharge to: Home    Plan of care discussed with pt at bedside.     Romie Hill DO

## 2019-03-19 NOTE — CM/SW NOTE
Patient declines Mari Rubio at this time. / to remain available for support and/or discharge planning.      Lily Flower RN   [address]  [phone]

## 2019-03-19 NOTE — PLAN OF CARE
Diabetes/Glucose Control    • Glucose maintained within prescribed range Progressing        Patient/Family Goals    • Patient/Family Long Term Goal Progressing    • Patient/Family Short Term Goal Progressing          Pt A/OX4. .  On RA maintaining O2 sat

## 2019-03-20 VITALS
OXYGEN SATURATION: 94 % | DIASTOLIC BLOOD PRESSURE: 87 MMHG | TEMPERATURE: 98 F | HEIGHT: 69 IN | BODY MASS INDEX: 31.25 KG/M2 | SYSTOLIC BLOOD PRESSURE: 149 MMHG | WEIGHT: 211 LBS | RESPIRATION RATE: 18 BRPM | HEART RATE: 96 BPM

## 2019-03-20 PROCEDURE — 99217 OBSERVATION CARE DISCHARGE: CPT | Performed by: HOSPITALIST

## 2019-03-20 RX ORDER — FUROSEMIDE 10 MG/ML
20 INJECTION INTRAMUSCULAR; INTRAVENOUS ONCE
Status: COMPLETED | OUTPATIENT
Start: 2019-03-20 | End: 2019-03-20

## 2019-03-20 RX ORDER — ALBUTEROL SULFATE 90 UG/1
2 AEROSOL, METERED RESPIRATORY (INHALATION) EVERY 6 HOURS PRN
Qty: 1 INHALER | Refills: 0 | Status: SHIPPED | OUTPATIENT
Start: 2019-03-20 | End: 2019-07-01

## 2019-03-20 RX ORDER — PREDNISONE 20 MG/1
TABLET ORAL
Qty: 9 TABLET | Refills: 0 | Status: SHIPPED | OUTPATIENT
Start: 2019-03-20 | End: 2019-03-27

## 2019-03-20 NOTE — PROGRESS NOTES
SPO2 % ON ROOM AIR 95%    SPO2% AMBULATION ON ROOM AIR 86%      Pt O2 sats dropped to 86% while walking. Stopped to deep breathe and recovered to 90%. MD notified. Wants 1 dose of IV lasix.  Informed pt who said he does not want to take lasix unless absolute

## 2019-03-20 NOTE — PROGRESS NOTES
MD DR Jared Carmen NOTIFIED OF BLOOD SUGAR 393. NEW INSULIN ORDERS RECEIVED AND CARRIED OUT. PLAN OF CARE UPDATED AND EXPLAINED TO PATIENT. WILL CONTINUE TO MONITOR.

## 2019-03-20 NOTE — PROGRESS NOTES
NURSING DISCHARGE NOTE    Discharged Home via Wheelchair. Accompanied by Support staff  Belongings Taken by patient/family. Discharge paperwork reviewed with patient. Scripts for medications given to patient.  Reviewed side effects of new medication

## 2019-03-20 NOTE — DISCHARGE SUMMARY
[facility] HOSPITAL[facility]  DISCHARGE SUMMARY     Heike Harper Patient Status:  Observation    3/22/1935 MRN [id number]   [facility] 3NW-A Attending No att. providers found   Hosp Day # 0 PCP Yusef Restrepo MD     Date of Admission: 3/15/2019  Mukund Phosphate 30 MG Caps  Commonly known as:  TAMIFLU      Take 1 capsule (30 mg total) by mouth every 12 (twelve) hours.    Quantity:  6 capsule  Refills:  0        CHANGE how you take these medications      Instructions Prescription details   Warfarin Sodium before breakfast.   Quantity:  90 capsule  Refills:  3     Pravastatin Sodium 40 MG Tabs  Commonly known as:  PRAVACHOL      TAKE 1 TABLET(40 MG) BY MOUTH DAILY   Quantity:  90 tablet  Refills:  0     PRESERVISION AREDS Tabs      Take 1 tablet by mouth 2 ( the visit as well as a list of current specialists (care team). Vital signs:       Physical Exam:    General: No acute distress. Respiratory: Clear to auscultation bilaterally. No wheezes. No rhonchi.   Cardiovascular: S1, S2. Regular r

## 2019-03-20 NOTE — PROGRESS NOTES
Assumed care of pt at this time  Resting comfortably in bed  Asking for blood glucose to be re-checked

## 2019-03-21 ENCOUNTER — PATIENT OUTREACH (OUTPATIENT)
Dept: CASE MANAGEMENT | Age: 84
End: 2019-03-21

## 2019-03-21 DIAGNOSIS — I10 ESSENTIAL HYPERTENSION: ICD-10-CM

## 2019-03-21 DIAGNOSIS — J98.01 ACUTE BRONCHOSPASM: ICD-10-CM

## 2019-03-21 DIAGNOSIS — Z02.9 ENCOUNTERS FOR UNSPECIFIED ADMINISTRATIVE PURPOSE: ICD-10-CM

## 2019-03-21 PROCEDURE — 1111F DSCHRG MED/CURRENT MED MERGE: CPT

## 2019-03-21 NOTE — DISCHARGE SUMMARY
[facility] HOSPITAL[facility]  DISCHARGE SUMMARY     Angelica Lopez Patient Status:  Observation    3/22/1935 MRN [id number]   [facility] 5NW-A Attending No att. providers found   Hosp Day # 0 PCP Zuleika Leon MD     Date of Admission: 3/18/2019  Mukund Albuterol Sulfate  (90 Base) MCG/ACT Aers      Inhale 2 puffs into the lungs every 6 (six) hours as needed for Wheezing or Shortness of Breath.    Quantity:  1 Inhaler  Refills:  0     predniSONE 20 MG Tabs  Commonly known as:  DELTASONE      40mg tablet  Refills:  0     metFORMIN HCl 500 MG Tabs  Commonly known as:  GLUCOPHAGE      TAKE 1 TABLET(500 MG) BY MOUTH TWICE DAILY WITH MEALS   Quantity:  180 tablet  Refills:  1     omeprazole 20 MG Cpdr  Commonly known as:  PRILOSEC      Take 1 capsule (2 PM  NON-[facility] HOSPITAL FOLLOW UP [5060] 30 min. Delonte Covington MD    Patient Instructions:      Please bring your medications bottles or an update list of all medications.                 4/9/2019 10:00 AM  MEDICAR

## 2019-03-21 NOTE — PROGRESS NOTES
Initial Post Discharge Follow Up   Discharge Date: 3/20/19  Contact Date: 3/21/2019    Consent Verification:  Assessment Completed With: Patient  HIPAA Verified?   Yes    Discharge Dx:     Acute bronchospasm due to recent viral URTI 9 hypoxia  Influenza can elevate blood sugar and will continue to monitor. Medications:   • When you were leaving the hospital were any medication changes discussed with you? yes  • If you were prescribed a new medication:   o Was the new medication’s purpose explained?  yes [address] [facility], [facility] [address] [place] [address]  [address] [facility] [address] [phone]          PCP TCM/HFU appointment: scheduled at D/C within 7-14 days  yes     NCM Reviewed/

## 2019-03-22 RX ORDER — LOSARTAN POTASSIUM AND HYDROCHLOROTHIAZIDE 25; 100 MG/1; MG/1
1 TABLET ORAL DAILY
Qty: 90 TABLET | Refills: 0 | Status: SHIPPED | OUTPATIENT
Start: 2019-03-22 | End: 2019-05-06

## 2019-03-22 NOTE — TELEPHONE ENCOUNTER
Refill requested:   Requested Prescriptions     Pending Prescriptions Disp Refills   • LOSARTAN POTASSIUM-HCTZ 100-25 MG Oral Tab [Pharmacy Med Name: LOSARTAN/HCTZ 100/25MG TABLETS] 90 tablet 0     Sig: TAKE 1 TABLET BY MOUTH DAILY       Passed protocol advised: 1 months for wellness     Appt Scheduled:   Your appointments     Date & Time Appointment Department [facility])    Mar 27, 2019  2:00 PM CDT Hospital Follow Up with Azar Amos MD [place] [address], [facility]

## 2019-03-24 DIAGNOSIS — M48.062 SPINAL STENOSIS OF LUMBAR REGION WITH NEUROGENIC CLAUDICATION: ICD-10-CM

## 2019-03-24 DIAGNOSIS — K59.04 CHRONIC IDIOPATHIC CONSTIPATION: ICD-10-CM

## 2019-03-24 DIAGNOSIS — E03.9 HYPOTHYROIDISM, UNSPECIFIED TYPE: ICD-10-CM

## 2019-03-24 DIAGNOSIS — E11.9 CONTROLLED TYPE 2 DIABETES MELLITUS WITHOUT COMPLICATION, WITHOUT LONG-TERM CURRENT USE OF INSULIN (HCC): ICD-10-CM

## 2019-03-24 DIAGNOSIS — Z00.00 ROUTINE GENERAL MEDICAL EXAMINATION AT A HEALTH CARE FACILITY: ICD-10-CM

## 2019-03-24 DIAGNOSIS — E78.2 MIXED HYPERLIPIDEMIA: ICD-10-CM

## 2019-03-24 DIAGNOSIS — I48.91 ATRIAL FIBRILLATION, UNSPECIFIED TYPE (HCC): ICD-10-CM

## 2019-03-24 DIAGNOSIS — R26.89 NEED FOR ASSISTANCE DUE TO UNSTEADY GAIT: ICD-10-CM

## 2019-03-24 DIAGNOSIS — I10 ESSENTIAL HYPERTENSION: ICD-10-CM

## 2019-03-24 DIAGNOSIS — E03.9 ACQUIRED HYPOTHYROIDISM: ICD-10-CM

## 2019-03-25 DIAGNOSIS — M48.062 SPINAL STENOSIS OF LUMBAR REGION WITH NEUROGENIC CLAUDICATION: ICD-10-CM

## 2019-03-25 DIAGNOSIS — K59.04 CHRONIC IDIOPATHIC CONSTIPATION: ICD-10-CM

## 2019-03-25 DIAGNOSIS — E11.9 DIABETES MELLITUS WITHOUT COMPLICATION (HCC): ICD-10-CM

## 2019-03-25 DIAGNOSIS — I10 ESSENTIAL HYPERTENSION: ICD-10-CM

## 2019-03-25 DIAGNOSIS — E03.9 ACQUIRED HYPOTHYROIDISM: ICD-10-CM

## 2019-03-25 DIAGNOSIS — E11.9 CONTROLLED TYPE 2 DIABETES MELLITUS WITHOUT COMPLICATION, WITHOUT LONG-TERM CURRENT USE OF INSULIN (HCC): ICD-10-CM

## 2019-03-25 RX ORDER — LEVOTHYROXINE SODIUM 88 UG/1
TABLET ORAL
Qty: 30 TABLET | Refills: 0 | Status: SHIPPED | OUTPATIENT
Start: 2019-03-25 | End: 2019-05-08

## 2019-03-25 RX ORDER — GLIPIZIDE 5 MG/1
5 TABLET ORAL
Qty: 90 TABLET | Refills: 0 | Status: SHIPPED | OUTPATIENT
Start: 2019-03-25 | End: 2019-07-25

## 2019-03-25 RX ORDER — TRAMADOL HYDROCHLORIDE 50 MG/1
50 TABLET ORAL 2 TIMES DAILY PRN
Qty: 180 TABLET | Refills: 0 | Status: SHIPPED | OUTPATIENT
Start: 2019-03-25 | End: 2019-03-30

## 2019-03-25 RX ORDER — TRAMADOL HYDROCHLORIDE 50 MG/1
50 TABLET ORAL 2 TIMES DAILY PRN
Qty: 180 TABLET | Refills: 0 | OUTPATIENT
Start: 2019-03-25

## 2019-03-25 NOTE — TELEPHONE ENCOUNTER
Refill requested:   Requested Prescriptions     Pending Prescriptions Disp Refills   • traMADol HCl 50 MG Oral Tab 180 tablet 0     Sig: Take 1 tablet (50 mg total) by mouth 2 (two) times daily as needed for Pain.      Non protocol medication      Last refi

## 2019-03-25 NOTE — TELEPHONE ENCOUNTER
Only 30 tabs of levothyroxine sent because he is due for a repeat TSH in mid-April. It was abnormal on 2/22/2019 labs.

## 2019-03-25 NOTE — TELEPHONE ENCOUNTER
Refill requested:   Requested Prescriptions     Pending Prescriptions Disp Refills   • traMADol HCl 50 MG Oral Tab 180 tablet 0     Sig: Take 1 tablet (50 mg total) by mouth 2 (two) times daily as needed for Pain.    • glipiZIDE 5 MG Oral Tab 90 tablet 0

## 2019-03-26 ENCOUNTER — TELEPHONE (OUTPATIENT)
Dept: INTERNAL MEDICINE CLINIC | Facility: CLINIC | Age: 84
End: 2019-03-26

## 2019-03-26 NOTE — TELEPHONE ENCOUNTER
Pt would like a call back would like advice on what he can do he is coming off of food poisoning and is having diarrhea has an apt with pcp tomorrow

## 2019-03-26 NOTE — TELEPHONE ENCOUNTER
Pt states he ate a fish sandwich from [employer] on 3/22/19 at 1pm and at 6pm developed severe diarrhea. He believes he has food poisoning.      He began to take imodium but spoke with Dr Dominga Cardoso on 3/24/19 (no documentation in his chart) whom instructed him to d

## 2019-03-26 NOTE — TELEPHONE ENCOUNTER
I agree with nurse's recommendations. (Definitely should not take immodium). He should hold his metformin and glipizide until he is eating more regularly. If his symptoms do not start to improve in the next 48 hours, he should be seen.

## 2019-03-26 NOTE — TELEPHONE ENCOUNTER
Pt notified of provider instructions. Pt stated he had appointment with Dr. Selwyn Hwang already scheduled for tomorrow. Offered to reschedule so he could monitor symptoms. Pt declined and stated he wanted to keep appointment.

## 2019-03-27 ENCOUNTER — OFFICE VISIT (OUTPATIENT)
Dept: INTERNAL MEDICINE CLINIC | Facility: CLINIC | Age: 84
End: 2019-03-27
Payer: MEDICARE

## 2019-03-27 VITALS
DIASTOLIC BLOOD PRESSURE: 62 MMHG | SYSTOLIC BLOOD PRESSURE: 118 MMHG | BODY MASS INDEX: 30.3 KG/M2 | OXYGEN SATURATION: 93 % | TEMPERATURE: 98 F | HEIGHT: 68.5 IN | WEIGHT: 202.25 LBS | HEART RATE: 95 BPM | RESPIRATION RATE: 16 BRPM

## 2019-03-27 DIAGNOSIS — G47.33 OSA (OBSTRUCTIVE SLEEP APNEA): ICD-10-CM

## 2019-03-27 DIAGNOSIS — E11.42 CONTROLLED TYPE 2 DIABETES MELLITUS WITH DIABETIC POLYNEUROPATHY, WITHOUT LONG-TERM CURRENT USE OF INSULIN (HCC): ICD-10-CM

## 2019-03-27 DIAGNOSIS — I10 ESSENTIAL HYPERTENSION: ICD-10-CM

## 2019-03-27 DIAGNOSIS — J10.1 INFLUENZA A (H1N1): Primary | ICD-10-CM

## 2019-03-27 PROCEDURE — 99495 TRANSJ CARE MGMT MOD F2F 14D: CPT | Performed by: INTERNAL MEDICINE

## 2019-03-27 NOTE — PROGRESS NOTES
Kenneth Kumar is a 80year old male. HPI:   Patient presents for hospital follow-up. He was initially admitted 3/15-3/17 for influenza A and readmitted on 3/18-3/20 for continued weakness. Comes in with wife.   1. Influenza: he did complete a full co Onset   • Cancer Father    • Cancer Mother    • Heart Disease Neg    • Stroke Neg       Past Medical History:   Diagnosis Date   • Arrhythmia     HX AFIB DX 3 YRS AGO   • Arthritis    • Atrial fibrillation (HCC)    • BPH (benign prostatic hyperplasia)    • Real Horton MD at [facility] MAIN OR   • SI JOINT INJECTION Right 7/7/2015    Performed by Suellen Lombardo MD at [address]   • SI JOINT INJECTION Left 6/9/2015    Performed by Suellen Lombardo MD at [address]   • SI JOINT back on CPAP mask. # Venous insufficiency of both lower extremities: norvasc was resumed in hospital but will stop it again. We replaced it with aldactone at last office visit. # Gait Instability: 2/2 spinal stenosis. Did improve with PT.  He has learne 2/2019  - Not on ASA b/c on coumadin  # HTN: cont losartan/hctz and aldactone. - did not hydralazine (swelling) and norvasc (swelling)  # Hypothyroidism: slightly high TSH in 2/2019, increased levo to 88 mcg and repeat TSH in 2 weeks.    # HLP: well contr

## 2019-03-28 ENCOUNTER — HOSPITAL ENCOUNTER (EMERGENCY)
Facility: HOSPITAL | Age: 84
Discharge: HOME | End: 2019-03-28
Attending: EMERGENCY MEDICINE
Payer: MEDICARE

## 2019-03-28 VITALS
HEART RATE: 70 BPM | SYSTOLIC BLOOD PRESSURE: 152 MMHG | DIASTOLIC BLOOD PRESSURE: 80 MMHG | RESPIRATION RATE: 20 BRPM | HEIGHT: 69 IN | BODY MASS INDEX: 29.92 KG/M2 | OXYGEN SATURATION: 98 % | TEMPERATURE: 98 F | WEIGHT: 202 LBS

## 2019-03-28 DIAGNOSIS — R79.1 ELEVATED INR: Primary | ICD-10-CM

## 2019-03-28 PROCEDURE — 85730 THROMBOPLASTIN TIME PARTIAL: CPT

## 2019-03-28 PROCEDURE — 85610 PROTHROMBIN TIME: CPT | Performed by: EMERGENCY MEDICINE

## 2019-03-28 PROCEDURE — 99283 EMERGENCY DEPT VISIT LOW MDM: CPT

## 2019-03-28 PROCEDURE — 85610 PROTHROMBIN TIME: CPT

## 2019-03-28 PROCEDURE — 85730 THROMBOPLASTIN TIME PARTIAL: CPT | Performed by: EMERGENCY MEDICINE

## 2019-03-28 PROCEDURE — 36415 COLL VENOUS BLD VENIPUNCTURE: CPT

## 2019-03-28 NOTE — ED PROVIDER NOTES
Patient Seen in: [facility] Emergency Department    History   Patient presents with:  Abnormal Result (metabolic, cardiac)    Stated Complaint: abn inr    HPI    72-year-old male who presents to the emergency department due to an elevated INR.   It w 2005, R in 2008   • [address] N/A 8/28/2014    Performed by Samson Kohler MD at [facility] MAIN OR   • Ellis Madrid  10/2003    L3-L4   • LUMBAR EPIDURAL N/A 4/27/2015    Performed by Marc Salomon MD at [address] [place] [address] 164/85   Pulse 79   Resp 20   Temp 97.8 °F (36.6 °C)   Temp src Temporal   SpO2 97 %   O2 Device None (Room air)       Current:BP (!) 164/85   Pulse 79   Temp 97.8 °F (36.6 °C) (Temporal)   Resp 20   Ht 175.3 cm (5' 9\")   Wt 91.6 kg   SpO2 97%   BMI 29.83 [id number]  [phone]    In 2 days          Medications Prescribed:  Current Discharge Medication List

## 2019-03-29 ENCOUNTER — PATIENT MESSAGE (OUTPATIENT)
Dept: INTERNAL MEDICINE CLINIC | Facility: CLINIC | Age: 84
End: 2019-03-29

## 2019-03-29 NOTE — TELEPHONE ENCOUNTER
LMTCB-it appear pts coumadin is being managed by coumadin clinic. He will need to contact coumadin clinic for advice regarding INR.

## 2019-03-29 NOTE — TELEPHONE ENCOUNTER
Pt would like a call back was in the ER yesterday and wanted to give condition update and make doctor aware

## 2019-03-29 NOTE — TELEPHONE ENCOUNTER
Clarified with pt that cardiologist/coumadin clinic manages his INR/coumadin. Pt instructed to contact cardiologist/coumadin clinic regarding most recent reading. Understanding verbalized.

## 2019-03-30 DIAGNOSIS — E03.9 ACQUIRED HYPOTHYROIDISM: ICD-10-CM

## 2019-03-30 DIAGNOSIS — E11.9 CONTROLLED TYPE 2 DIABETES MELLITUS WITHOUT COMPLICATION, WITHOUT LONG-TERM CURRENT USE OF INSULIN (HCC): ICD-10-CM

## 2019-03-30 DIAGNOSIS — M48.062 SPINAL STENOSIS OF LUMBAR REGION WITH NEUROGENIC CLAUDICATION: ICD-10-CM

## 2019-03-30 DIAGNOSIS — K59.04 CHRONIC IDIOPATHIC CONSTIPATION: ICD-10-CM

## 2019-03-30 DIAGNOSIS — I10 ESSENTIAL HYPERTENSION: ICD-10-CM

## 2019-04-01 NOTE — TELEPHONE ENCOUNTER
Refill requested:   Requested Prescriptions     Pending Prescriptions Disp Refills   • traMADol HCl 50 MG Oral Tab 180 tablet 0     Sig: Take 1 tablet (50 mg total) by mouth 2 (two) times daily as needed for Pain.        Failed protocol-non protocol med  [facility]  [place] at [facility] [address] [facility]  [phone]

## 2019-04-02 DIAGNOSIS — K59.04 CHRONIC IDIOPATHIC CONSTIPATION: ICD-10-CM

## 2019-04-02 DIAGNOSIS — E03.9 ACQUIRED HYPOTHYROIDISM: ICD-10-CM

## 2019-04-02 DIAGNOSIS — E11.9 CONTROLLED TYPE 2 DIABETES MELLITUS WITHOUT COMPLICATION, WITHOUT LONG-TERM CURRENT USE OF INSULIN (HCC): ICD-10-CM

## 2019-04-02 DIAGNOSIS — M48.062 SPINAL STENOSIS OF LUMBAR REGION WITH NEUROGENIC CLAUDICATION: ICD-10-CM

## 2019-04-02 DIAGNOSIS — I10 ESSENTIAL HYPERTENSION: ICD-10-CM

## 2019-04-03 RX ORDER — TRAMADOL HYDROCHLORIDE 50 MG/1
50 TABLET ORAL 2 TIMES DAILY PRN
Qty: 180 TABLET | Refills: 0 | Status: SHIPPED | OUTPATIENT
Start: 2019-04-03 | End: 2019-04-29

## 2019-04-03 RX ORDER — TRAMADOL HYDROCHLORIDE 50 MG/1
50 TABLET ORAL 2 TIMES DAILY PRN
Qty: 180 TABLET | Refills: 0 | Status: CANCELLED | OUTPATIENT
Start: 2019-04-03

## 2019-04-03 NOTE — TELEPHONE ENCOUNTER
Last OV: 3/27/19 with Dr. Hollie Corley   Last refill date: 3/25/19     #/refills: #180, 0 refills  When pt was asked to return for OV: April 2019  Upcoming appt/reason: 4/9/19 with Dr. Hollie Corley  Last labs March 2019

## 2019-04-09 ENCOUNTER — OFFICE VISIT (OUTPATIENT)
Dept: INTERNAL MEDICINE CLINIC | Facility: CLINIC | Age: 84
End: 2019-04-09
Payer: MEDICARE

## 2019-04-09 VITALS
SYSTOLIC BLOOD PRESSURE: 130 MMHG | OXYGEN SATURATION: 96 % | TEMPERATURE: 98 F | HEART RATE: 100 BPM | WEIGHT: 217.25 LBS | HEIGHT: 68 IN | DIASTOLIC BLOOD PRESSURE: 64 MMHG | RESPIRATION RATE: 16 BRPM | BODY MASS INDEX: 32.92 KG/M2

## 2019-04-09 DIAGNOSIS — E11.59 HYPERTENSION ASSOCIATED WITH DIABETES (HCC): ICD-10-CM

## 2019-04-09 DIAGNOSIS — E11.69 HYPERLIPIDEMIA DUE TO TYPE 2 DIABETES MELLITUS (HCC): ICD-10-CM

## 2019-04-09 DIAGNOSIS — I48.91 ATRIAL FIBRILLATION, UNSPECIFIED TYPE (HCC): ICD-10-CM

## 2019-04-09 DIAGNOSIS — E78.5 HYPERLIPIDEMIA DUE TO TYPE 2 DIABETES MELLITUS (HCC): ICD-10-CM

## 2019-04-09 DIAGNOSIS — R39.14 BENIGN PROSTATIC HYPERPLASIA WITH INCOMPLETE BLADDER EMPTYING: ICD-10-CM

## 2019-04-09 DIAGNOSIS — G47.33 OSA (OBSTRUCTIVE SLEEP APNEA): ICD-10-CM

## 2019-04-09 DIAGNOSIS — Z00.00 ROUTINE GENERAL MEDICAL EXAMINATION AT A HEALTH CARE FACILITY: Primary | ICD-10-CM

## 2019-04-09 DIAGNOSIS — M48.062 SPINAL STENOSIS OF LUMBAR REGION WITH NEUROGENIC CLAUDICATION: ICD-10-CM

## 2019-04-09 DIAGNOSIS — I15.2 HYPERTENSION ASSOCIATED WITH DIABETES (HCC): ICD-10-CM

## 2019-04-09 DIAGNOSIS — E11.42 CONTROLLED TYPE 2 DIABETES MELLITUS WITH DIABETIC POLYNEUROPATHY, WITHOUT LONG-TERM CURRENT USE OF INSULIN (HCC): ICD-10-CM

## 2019-04-09 DIAGNOSIS — E03.9 HYPOTHYROIDISM, UNSPECIFIED TYPE: ICD-10-CM

## 2019-04-09 DIAGNOSIS — N40.1 BENIGN PROSTATIC HYPERPLASIA WITH INCOMPLETE BLADDER EMPTYING: ICD-10-CM

## 2019-04-09 DIAGNOSIS — K21.00 REFLUX ESOPHAGITIS: ICD-10-CM

## 2019-04-09 DIAGNOSIS — E66.01 MORBIDLY OBESE (HCC): ICD-10-CM

## 2019-04-09 PROCEDURE — 99214 OFFICE O/P EST MOD 30 MIN: CPT | Performed by: INTERNAL MEDICINE

## 2019-04-09 PROCEDURE — G0439 PPPS, SUBSEQ VISIT: HCPCS | Performed by: INTERNAL MEDICINE

## 2019-04-09 PROCEDURE — G0444 DEPRESSION SCREEN ANNUAL: HCPCS | Performed by: INTERNAL MEDICINE

## 2019-04-09 NOTE — PATIENT INSTRUCTIONS
Please continue to email me your 14 day fasting blood sugar average every 2 weeks. Please repeat your TSH (thyroid function) in 3 weeks (first week of May).  You do NOT need to fast.     I do advise you get the TDAP (tetanus, diptheriae, pertussis) vacc

## 2019-04-09 NOTE — PROGRESS NOTES
Jared Willett is a 80year old male. HPI:   Patient presents for medicare wellness exam and additional issues noted below. 1. Elevated INR: no bleeding. INR is now down to 2.0 and he has resumed coumadin. All managed by coumadin clinic.   2. Chronic Age of Onset   • Cancer Father    • Cancer Mother    • Heart Disease Neg    • Stroke Neg       Past Medical History:   Diagnosis Date   • Arrhythmia     HX AFIB DX 3 YRS AGO   • Arthritis    • Atrial fibrillation (HCC)    • BPH (benign prostatic hyperplasi Nishant Martinez MD at [facility] MAIN OR   • SI JOINT INJECTION Right 7/7/2015    Performed by Nataly Lacey MD at [address]   • SI JOINT INJECTION Left 6/9/2015    Performed by Nataly Lacey MD at [address]   • SI fall prevention. # Memory Difficulties: repeat MMSE 11/2018 was 27/30 (it was 24/30 in 4/2017). He feels focusing is helping his attention to details.    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 # Health Maintenance: medicare wellness exam 4/9/2019   Colon Cancer Screening: colonoscopy in 3/2014 with hyperplastic and adenomatous polyps.  He declines repeating a colonoscopy

## 2019-04-15 ENCOUNTER — OFFICE VISIT (OUTPATIENT)
Dept: SLEEP CENTER | Facility: HOSPITAL | Age: 84
End: 2019-04-15
Attending: INTERNAL MEDICINE
Payer: MEDICARE

## 2019-04-15 DIAGNOSIS — G47.33 OSA (OBSTRUCTIVE SLEEP APNEA): ICD-10-CM

## 2019-04-15 PROCEDURE — 95811 POLYSOM 6/>YRS CPAP 4/> PARM: CPT

## 2019-04-19 NOTE — PROCEDURES
[address]       Accredited by the [facility] ([facility])    PATIENT'S NAME:        Jose A Tipton  ATTENDING PHYSICIAN:   Andie Manning M.D.   REFERRING PHYSICIAN:   ABISAI Weber 26 apneas and hypopneas were detected corresponding to an apnea-hypopnea index of 16. There was a clear REM predominance with a REM AHI of 51. The oxygen conrad is 83%. The patient spent 95% of the record with a saturation above 90%.     Because of the se

## 2019-04-20 DIAGNOSIS — M48.062 SPINAL STENOSIS OF LUMBAR REGION WITH NEUROGENIC CLAUDICATION: ICD-10-CM

## 2019-04-20 DIAGNOSIS — E11.9 DIABETES MELLITUS WITHOUT COMPLICATION (HCC): ICD-10-CM

## 2019-04-20 DIAGNOSIS — E03.9 ACQUIRED HYPOTHYROIDISM: ICD-10-CM

## 2019-04-20 DIAGNOSIS — I10 ESSENTIAL HYPERTENSION: ICD-10-CM

## 2019-04-20 DIAGNOSIS — E11.9 CONTROLLED TYPE 2 DIABETES MELLITUS WITHOUT COMPLICATION, WITHOUT LONG-TERM CURRENT USE OF INSULIN (HCC): ICD-10-CM

## 2019-04-20 DIAGNOSIS — K59.04 CHRONIC IDIOPATHIC CONSTIPATION: ICD-10-CM

## 2019-04-22 RX ORDER — TRAMADOL HYDROCHLORIDE 50 MG/1
50 TABLET ORAL 2 TIMES DAILY PRN
Qty: 180 TABLET | Refills: 0 | OUTPATIENT
Start: 2019-04-22

## 2019-04-22 NOTE — TELEPHONE ENCOUNTER
Refill requested:   Requested Prescriptions     Pending Prescriptions Disp Refills   • metFORMIN HCl 500 MG Oral Tab 180 tablet 1     Sig: TAKE 1 TABLET(500 MG) BY MOUTH TWICE DAILY WITH MEALS   Passed protocol    Last refill: 1- # 180 tabs with 1 r

## 2019-04-23 DIAGNOSIS — M48.062 SPINAL STENOSIS OF LUMBAR REGION WITH NEUROGENIC CLAUDICATION: ICD-10-CM

## 2019-04-23 DIAGNOSIS — E11.9 CONTROLLED TYPE 2 DIABETES MELLITUS WITHOUT COMPLICATION, WITHOUT LONG-TERM CURRENT USE OF INSULIN (HCC): ICD-10-CM

## 2019-04-23 DIAGNOSIS — I10 ESSENTIAL HYPERTENSION: ICD-10-CM

## 2019-04-23 DIAGNOSIS — E03.9 ACQUIRED HYPOTHYROIDISM: ICD-10-CM

## 2019-04-23 DIAGNOSIS — K59.04 CHRONIC IDIOPATHIC CONSTIPATION: ICD-10-CM

## 2019-04-24 RX ORDER — TRAMADOL HYDROCHLORIDE 50 MG/1
50 TABLET ORAL 2 TIMES DAILY PRN
Qty: 180 TABLET | Refills: 0 | OUTPATIENT
Start: 2019-04-24

## 2019-04-25 DIAGNOSIS — I10 ESSENTIAL HYPERTENSION: ICD-10-CM

## 2019-04-25 DIAGNOSIS — E11.42 CONTROLLED TYPE 2 DIABETES MELLITUS WITH DIABETIC POLYNEUROPATHY, WITHOUT LONG-TERM CURRENT USE OF INSULIN (HCC): ICD-10-CM

## 2019-04-26 RX ORDER — SPIRONOLACTONE 50 MG/1
50 TABLET, FILM COATED ORAL DAILY
Qty: 90 TABLET | Refills: 1 | Status: SHIPPED | OUTPATIENT
Start: 2019-04-26 | End: 2019-11-20

## 2019-04-26 NOTE — TELEPHONE ENCOUNTER
Refill requested:   Requested Prescriptions     Pending Prescriptions Disp Refills   • spironolactone 25 MG Oral Tab 90 tablet 3     Sig: Take 1 tablet (25 mg total) by mouth daily.      Passed protocol    Last refill: 2/14/2019 # 90 tabs with 3 refills

## 2019-04-27 ENCOUNTER — PATIENT MESSAGE (OUTPATIENT)
Dept: INTERNAL MEDICINE CLINIC | Facility: CLINIC | Age: 84
End: 2019-04-27

## 2019-04-29 DIAGNOSIS — M48.062 SPINAL STENOSIS OF LUMBAR REGION WITH NEUROGENIC CLAUDICATION: ICD-10-CM

## 2019-04-29 DIAGNOSIS — E03.9 ACQUIRED HYPOTHYROIDISM: ICD-10-CM

## 2019-04-29 DIAGNOSIS — E11.9 CONTROLLED TYPE 2 DIABETES MELLITUS WITHOUT COMPLICATION, WITHOUT LONG-TERM CURRENT USE OF INSULIN (HCC): ICD-10-CM

## 2019-04-29 DIAGNOSIS — I10 ESSENTIAL HYPERTENSION: ICD-10-CM

## 2019-04-29 DIAGNOSIS — K59.04 CHRONIC IDIOPATHIC CONSTIPATION: ICD-10-CM

## 2019-04-29 RX ORDER — TRAMADOL HYDROCHLORIDE 50 MG/1
50 TABLET ORAL 2 TIMES DAILY PRN
Qty: 180 TABLET | Refills: 0 | Status: SHIPPED | OUTPATIENT
Start: 2019-04-29 | End: 2019-07-01

## 2019-04-29 NOTE — TELEPHONE ENCOUNTER
From: Geoff Lorenzo  To: Audi Iverson MD  Sent: 4/27/2019 11:25 AM CDT  Subject: Prescription Question    There appears to be a problem ordering tramadol.  2 requests in the last 2 weeks resulted in [employer] approval) I now have a 5 day supply

## 2019-05-03 ENCOUNTER — APPOINTMENT (OUTPATIENT)
Dept: LAB | Age: 84
End: 2019-05-03
Attending: INTERNAL MEDICINE
Payer: MEDICARE

## 2019-05-03 DIAGNOSIS — E03.9 HYPOTHYROIDISM, UNSPECIFIED TYPE: ICD-10-CM

## 2019-05-03 DIAGNOSIS — E03.9 ACQUIRED HYPOTHYROIDISM: ICD-10-CM

## 2019-05-03 PROCEDURE — 84443 ASSAY THYROID STIM HORMONE: CPT

## 2019-05-03 PROCEDURE — 36415 COLL VENOUS BLD VENIPUNCTURE: CPT

## 2019-05-03 PROCEDURE — 84439 ASSAY OF FREE THYROXINE: CPT

## 2019-05-06 DIAGNOSIS — I10 ESSENTIAL HYPERTENSION: ICD-10-CM

## 2019-05-07 RX ORDER — LOSARTAN POTASSIUM AND HYDROCHLOROTHIAZIDE 25; 100 MG/1; MG/1
1 TABLET ORAL DAILY
Qty: 90 TABLET | Refills: 3 | Status: SHIPPED | OUTPATIENT
Start: 2019-05-07 | End: 2019-09-21

## 2019-05-07 NOTE — TELEPHONE ENCOUNTER
Last OV: 4/9/19 with Dr. Leander Christian  Last refill date: 3/22/19     #/refills: #90, 0 refills  When pt was asked to return for OV: 6 months  Upcoming appt/reason: no upcoming appt  Last labs February/March 2019

## 2019-05-08 DIAGNOSIS — E03.9 HYPOTHYROIDISM, UNSPECIFIED TYPE: ICD-10-CM

## 2019-05-08 DIAGNOSIS — E11.9 CONTROLLED TYPE 2 DIABETES MELLITUS WITHOUT COMPLICATION, WITHOUT LONG-TERM CURRENT USE OF INSULIN (HCC): ICD-10-CM

## 2019-05-08 DIAGNOSIS — M48.062 SPINAL STENOSIS OF LUMBAR REGION WITH NEUROGENIC CLAUDICATION: ICD-10-CM

## 2019-05-08 DIAGNOSIS — I48.91 ATRIAL FIBRILLATION, UNSPECIFIED TYPE (HCC): ICD-10-CM

## 2019-05-08 DIAGNOSIS — Z00.00 ROUTINE GENERAL MEDICAL EXAMINATION AT A HEALTH CARE FACILITY: ICD-10-CM

## 2019-05-08 DIAGNOSIS — I10 ESSENTIAL HYPERTENSION: ICD-10-CM

## 2019-05-08 DIAGNOSIS — E78.2 MIXED HYPERLIPIDEMIA: ICD-10-CM

## 2019-05-08 DIAGNOSIS — R26.89 NEED FOR ASSISTANCE DUE TO UNSTEADY GAIT: ICD-10-CM

## 2019-05-09 RX ORDER — LEVOTHYROXINE SODIUM 88 UG/1
88 TABLET ORAL
Qty: 90 TABLET | Refills: 0 | Status: SHIPPED | OUTPATIENT
Start: 2019-05-09 | End: 2019-08-24

## 2019-05-09 NOTE — TELEPHONE ENCOUNTER
Levothyroxine 88 mcg 1 tab daily filled 3/25/19 30 with 0 refills     LOV 4/9/19  increased levo to 88 mcg and repeat TSH in a few weeks. return in 6 months for HTN/DM.    No upcoming apt on file   Labs 5/3/19

## 2019-05-14 ENCOUNTER — OFFICE VISIT (OUTPATIENT)
Dept: PHYSICAL THERAPY | Age: 84
End: 2019-05-14
Attending: INTERNAL MEDICINE
Payer: MEDICARE

## 2019-05-14 PROCEDURE — 97161 PT EVAL LOW COMPLEX 20 MIN: CPT | Performed by: PHYSICAL THERAPIST

## 2019-05-14 PROCEDURE — 97140 MANUAL THERAPY 1/> REGIONS: CPT | Performed by: PHYSICAL THERAPIST

## 2019-05-14 PROCEDURE — 97530 THERAPEUTIC ACTIVITIES: CPT | Performed by: PHYSICAL THERAPIST

## 2019-05-16 ENCOUNTER — OFFICE VISIT (OUTPATIENT)
Dept: PHYSICAL THERAPY | Age: 84
End: 2019-05-16
Attending: INTERNAL MEDICINE
Payer: MEDICARE

## 2019-05-16 DIAGNOSIS — R60.0 BILATERAL LOWER EXTREMITY EDEMA: ICD-10-CM

## 2019-05-16 PROCEDURE — 97140 MANUAL THERAPY 1/> REGIONS: CPT | Performed by: PHYSICAL THERAPIST

## 2019-05-16 PROCEDURE — 97016 VASOPNEUMATIC DEVICE THERAPY: CPT | Performed by: PHYSICAL THERAPIST

## 2019-05-16 PROCEDURE — 97530 THERAPEUTIC ACTIVITIES: CPT | Performed by: PHYSICAL THERAPIST

## 2019-05-16 NOTE — PROGRESS NOTES
Dx: B LE Lymphedema/edema          Authorized # of Visits:  10         Next MD visit: none scheduled  Fall Risk: low        Precautions: Diabetes, HBP, spinal fusion (lumbar), blood thinner             Subjective:  Pt with slight increase in girth today ar 1-3cm and improve tissue quality to normal to allow pt to fit into proper fitting socks and shoes and to reduce pt's infection risk.  10 sessions   Pt will be independent in use of compression garments, self-manual lymph drainage, decongestive exercises and

## 2019-05-21 ENCOUNTER — OFFICE VISIT (OUTPATIENT)
Dept: PHYSICAL THERAPY | Age: 84
End: 2019-05-21
Attending: INTERNAL MEDICINE
Payer: MEDICARE

## 2019-05-21 PROCEDURE — 97016 VASOPNEUMATIC DEVICE THERAPY: CPT | Performed by: PHYSICAL THERAPIST

## 2019-05-21 PROCEDURE — 97530 THERAPEUTIC ACTIVITIES: CPT | Performed by: PHYSICAL THERAPIST

## 2019-05-21 PROCEDURE — 97140 MANUAL THERAPY 1/> REGIONS: CPT | Performed by: PHYSICAL THERAPIST

## 2019-05-21 NOTE — PROGRESS NOTES
Dx: B LE Lymphedema/edema          Authorized # of Visits:  10         Next MD visit: none scheduled  Fall Risk: low        Precautions: Diabetes, HBP, spinal fusion (lumbar), blood thinner             Subjective:  Pt reports that he is having difficulty w 1-3cm and improve tissue quality to normal to allow pt to fit into proper fitting socks and shoes and to reduce pt's infection risk.  10 sessions   Pt will be independent in use of compression garments, self-manual lymph drainage, decongestive exercises and

## 2019-05-23 ENCOUNTER — OFFICE VISIT (OUTPATIENT)
Dept: PHYSICAL THERAPY | Age: 84
End: 2019-05-23
Attending: INTERNAL MEDICINE
Payer: MEDICARE

## 2019-05-23 PROCEDURE — 97535 SELF CARE MNGMENT TRAINING: CPT | Performed by: PHYSICAL THERAPIST

## 2019-05-23 PROCEDURE — 97016 VASOPNEUMATIC DEVICE THERAPY: CPT | Performed by: PHYSICAL THERAPIST

## 2019-05-23 PROCEDURE — 97140 MANUAL THERAPY 1/> REGIONS: CPT | Performed by: PHYSICAL THERAPIST

## 2019-05-23 NOTE — PROGRESS NOTES
Dx: B LE Lymphedema/edema          Authorized # of Visits:  10         Next MD visit: none scheduled  Fall Risk: low        Precautions: Diabetes, HBP, spinal fusion (lumbar), blood thinner             Subjective:  Pt reports that he is having difficulty w sessions    Pt will tolerate B LE compression garments for 10-12 hours. 10 sessions   Pt/Caregiver will be independent in B LE short compression garments. 10 sessions    Reduce B LE lymphedema volume by 1-3cm to allow pt to proper fitting socks and shoes.

## 2019-05-28 ENCOUNTER — OFFICE VISIT (OUTPATIENT)
Dept: PHYSICAL THERAPY | Age: 84
End: 2019-05-28
Attending: INTERNAL MEDICINE
Payer: MEDICARE

## 2019-05-28 PROCEDURE — 97140 MANUAL THERAPY 1/> REGIONS: CPT | Performed by: PHYSICAL THERAPIST

## 2019-05-28 PROCEDURE — 97535 SELF CARE MNGMENT TRAINING: CPT | Performed by: PHYSICAL THERAPIST

## 2019-05-28 NOTE — PROGRESS NOTES
Dx: B LE Lymphedema/edema          Authorized # of Visits:  10         Next MD visit: none scheduled  Fall Risk: low        Precautions: Diabetes, HBP, spinal fusion (lumbar), blood thinner             Subjective:  Pt reports that he is now able to get the tolerate B LE compression garments for 10-12 hours. 10 sessions   Pt/Caregiver will be independent in B LE short compression garments. 10 sessions    Reduce B LE lymphedema volume by 1-3cm to allow pt to proper fitting socks and shoes.  10 sessions   Reduce

## 2019-05-30 ENCOUNTER — APPOINTMENT (OUTPATIENT)
Dept: PHYSICAL THERAPY | Age: 84
End: 2019-05-30
Attending: INTERNAL MEDICINE
Payer: MEDICARE

## 2019-05-31 DIAGNOSIS — K59.04 CHRONIC IDIOPATHIC CONSTIPATION: ICD-10-CM

## 2019-05-31 DIAGNOSIS — E03.9 ACQUIRED HYPOTHYROIDISM: ICD-10-CM

## 2019-05-31 DIAGNOSIS — M48.062 SPINAL STENOSIS OF LUMBAR REGION WITH NEUROGENIC CLAUDICATION: ICD-10-CM

## 2019-05-31 DIAGNOSIS — E11.9 CONTROLLED TYPE 2 DIABETES MELLITUS WITHOUT COMPLICATION, WITHOUT LONG-TERM CURRENT USE OF INSULIN (HCC): ICD-10-CM

## 2019-05-31 DIAGNOSIS — I10 ESSENTIAL HYPERTENSION: ICD-10-CM

## 2019-06-03 NOTE — TELEPHONE ENCOUNTER
Refill requested:   Requested Prescriptions     Pending Prescriptions Disp Refills   • traMADol HCl 50 MG Oral Tab 180 tablet 0     Sig: Take 1 tablet (50 mg total) by mouth 2 (two) times daily as needed for Pain.    non protocol medication    Last refill:

## 2019-06-03 NOTE — TELEPHONE ENCOUNTER
I sent a precription for tramadol on 4/29/2019. That should last him for 3 months. Please clarify if he is using tramadol more than 1 tablet twice daily. I will adjust prescription in this case.

## 2019-06-13 RX ORDER — TRAMADOL HYDROCHLORIDE 50 MG/1
50 TABLET ORAL 2 TIMES DAILY PRN
Qty: 180 TABLET | Refills: 0 | OUTPATIENT
Start: 2019-06-13

## 2019-06-13 NOTE — TELEPHONE ENCOUNTER
Spoke to pt - he states he gets #60 at a time. He will check his bottle to see if he has additional refills. He takes medication 2 x daily.

## 2019-06-15 DIAGNOSIS — E78.2 MIXED HYPERLIPIDEMIA: ICD-10-CM

## 2019-06-15 DIAGNOSIS — I10 ESSENTIAL HYPERTENSION: ICD-10-CM

## 2019-06-18 ENCOUNTER — OFFICE VISIT (OUTPATIENT)
Dept: PHYSICAL THERAPY | Age: 84
End: 2019-06-18
Attending: INTERNAL MEDICINE
Payer: MEDICARE

## 2019-06-18 DIAGNOSIS — I10 ESSENTIAL HYPERTENSION: ICD-10-CM

## 2019-06-18 PROCEDURE — 97140 MANUAL THERAPY 1/> REGIONS: CPT | Performed by: PHYSICAL THERAPIST

## 2019-06-18 PROCEDURE — 97016 VASOPNEUMATIC DEVICE THERAPY: CPT | Performed by: PHYSICAL THERAPIST

## 2019-06-18 PROCEDURE — 97535 SELF CARE MNGMENT TRAINING: CPT | Performed by: PHYSICAL THERAPIST

## 2019-06-18 RX ORDER — PRAVASTATIN SODIUM 40 MG
40 TABLET ORAL NIGHTLY
Qty: 90 TABLET | Refills: 0 | Status: SHIPPED | OUTPATIENT
Start: 2019-06-18 | End: 2019-09-04

## 2019-06-18 RX ORDER — LOSARTAN POTASSIUM AND HYDROCHLOROTHIAZIDE 25; 100 MG/1; MG/1
1 TABLET ORAL DAILY
Qty: 90 TABLET | Refills: 3 | OUTPATIENT
Start: 2019-06-18

## 2019-06-18 NOTE — TELEPHONE ENCOUNTER
Losartan hctz 100-25 mg 1 tab daily filled 5-7-19 90 with 3 refills   Pravastatin 40 mg 1 tab daily filled 2-11-19 90 with 0 refills     LOV 4-9-19   return in 6 months for HTN/DM.    No upcoming apt on file   Labs 2-22-19

## 2019-06-18 NOTE — PROGRESS NOTES
Discharge Summary  Pt has attended 6, cancelled 0, and no shown 0 visits in Physical Therapy. Subjective:  Pt reports that he is independent with CDT with SMLD, decongestive therex and compression garments.     Objective: 6/18/19      ASHLEY Fam fit into proper fitting socks and shoes and to reduce pt's infection risk. 10 sessions. met   Pt will be independent in use of compression garments, self-manual lymph drainage, decongestive exercises and lymphedema precautions  for life-long self-management vendor  Charges: Manual:3, Self care:1, Pump:1       Total Timed Treatment: 60 min  Total Treatment Time: 60 min

## 2019-06-20 ENCOUNTER — APPOINTMENT (OUTPATIENT)
Dept: PHYSICAL THERAPY | Age: 84
End: 2019-06-20
Attending: INTERNAL MEDICINE
Payer: MEDICARE

## 2019-06-20 RX ORDER — LOSARTAN POTASSIUM AND HYDROCHLOROTHIAZIDE 25; 100 MG/1; MG/1
1 TABLET ORAL DAILY
Qty: 90 TABLET | Refills: 3 | OUTPATIENT
Start: 2019-06-20

## 2019-06-20 NOTE — TELEPHONE ENCOUNTER
Called pharmacy regarding medication refill and per pharmacy patient picked up script on 5/12/19. Per patient he is stating he picked up script on 3/22/19 for a 90 day and they would not have given him medication in may since it would be too early.

## 2019-06-28 ENCOUNTER — TELEPHONE (OUTPATIENT)
Dept: INTERNAL MEDICINE CLINIC | Facility: CLINIC | Age: 84
End: 2019-06-28

## 2019-06-28 DIAGNOSIS — K59.04 CHRONIC IDIOPATHIC CONSTIPATION: ICD-10-CM

## 2019-06-28 DIAGNOSIS — E11.9 CONTROLLED TYPE 2 DIABETES MELLITUS WITHOUT COMPLICATION, WITHOUT LONG-TERM CURRENT USE OF INSULIN (HCC): ICD-10-CM

## 2019-06-28 DIAGNOSIS — I10 ESSENTIAL HYPERTENSION: ICD-10-CM

## 2019-06-28 DIAGNOSIS — E03.9 ACQUIRED HYPOTHYROIDISM: ICD-10-CM

## 2019-06-28 DIAGNOSIS — M48.062 SPINAL STENOSIS OF LUMBAR REGION WITH NEUROGENIC CLAUDICATION: ICD-10-CM

## 2019-06-28 RX ORDER — TRAMADOL HYDROCHLORIDE 50 MG/1
50 TABLET ORAL 2 TIMES DAILY PRN
Qty: 180 TABLET | Refills: 0 | Status: CANCELLED | OUTPATIENT
Start: 2019-06-28

## 2019-06-29 NOTE — TELEPHONE ENCOUNTER
Tramadol HCI 50 MG oral tab  Take 1 tab (50MG) by mouth 2 times daily as needed for pain.          Last OV relevant to medication: 04/09/2019    Last refill date: 04/29/2019       #/refills: #180, 0    When pt was asked to return for OV: 6 moths for HTN/DM

## 2019-07-01 RX ORDER — TRAMADOL HYDROCHLORIDE 50 MG/1
50 TABLET ORAL 2 TIMES DAILY PRN
Qty: 180 TABLET | Refills: 0 | Status: SHIPPED | OUTPATIENT
Start: 2019-07-01 | End: 2019-07-25

## 2019-07-25 DIAGNOSIS — E11.9 CONTROLLED TYPE 2 DIABETES MELLITUS WITHOUT COMPLICATION, WITHOUT LONG-TERM CURRENT USE OF INSULIN (HCC): ICD-10-CM

## 2019-07-25 DIAGNOSIS — E03.9 ACQUIRED HYPOTHYROIDISM: ICD-10-CM

## 2019-07-25 DIAGNOSIS — I10 ESSENTIAL HYPERTENSION: ICD-10-CM

## 2019-07-25 DIAGNOSIS — E11.9 DIABETES MELLITUS WITHOUT COMPLICATION (HCC): ICD-10-CM

## 2019-07-25 DIAGNOSIS — M48.062 SPINAL STENOSIS OF LUMBAR REGION WITH NEUROGENIC CLAUDICATION: ICD-10-CM

## 2019-07-25 DIAGNOSIS — K59.04 CHRONIC IDIOPATHIC CONSTIPATION: ICD-10-CM

## 2019-07-28 DIAGNOSIS — M48.062 SPINAL STENOSIS OF LUMBAR REGION WITH NEUROGENIC CLAUDICATION: ICD-10-CM

## 2019-07-28 DIAGNOSIS — E03.9 ACQUIRED HYPOTHYROIDISM: ICD-10-CM

## 2019-07-28 DIAGNOSIS — I10 ESSENTIAL HYPERTENSION: ICD-10-CM

## 2019-07-28 DIAGNOSIS — E11.9 CONTROLLED TYPE 2 DIABETES MELLITUS WITHOUT COMPLICATION, WITHOUT LONG-TERM CURRENT USE OF INSULIN (HCC): ICD-10-CM

## 2019-07-28 DIAGNOSIS — K59.04 CHRONIC IDIOPATHIC CONSTIPATION: ICD-10-CM

## 2019-07-28 DIAGNOSIS — E11.9 DIABETES MELLITUS WITHOUT COMPLICATION (HCC): ICD-10-CM

## 2019-07-31 RX ORDER — TRAMADOL HYDROCHLORIDE 50 MG/1
50 TABLET ORAL 2 TIMES DAILY PRN
Qty: 180 TABLET | Refills: 0 | OUTPATIENT
Start: 2019-07-31

## 2019-07-31 RX ORDER — TRAMADOL HYDROCHLORIDE 50 MG/1
50 TABLET ORAL 2 TIMES DAILY PRN
Qty: 180 TABLET | Refills: 0 | Status: SHIPPED | OUTPATIENT
Start: 2019-07-31 | End: 2019-10-21

## 2019-07-31 RX ORDER — GLIPIZIDE 5 MG/1
5 TABLET ORAL
Qty: 90 TABLET | Refills: 3 | Status: SHIPPED | OUTPATIENT
Start: 2019-07-31 | End: 2019-10-31

## 2019-07-31 RX ORDER — GLIPIZIDE 5 MG/1
5 TABLET ORAL
Qty: 90 TABLET | Refills: 0 | OUTPATIENT
Start: 2019-07-31

## 2019-07-31 NOTE — TELEPHONE ENCOUNTER
Tramadol was picked up on July 3rd, 2019 with #60 tablets dispensed.  Disregard request    Glipizide 5 mg oral tab     Last OV relevant to medication: 4/9/19  Last refill date: 03/25/2019     #/refills: #90 w/ 0 refills   When pt was asked to return for OV: 6 months  Upcoming appt/reason: No future appointments   Lab Results   Component Value Date     (H) 03/15/2019    A1C 6.8 (H) 03/15/2019

## 2019-08-24 DIAGNOSIS — I10 ESSENTIAL HYPERTENSION: ICD-10-CM

## 2019-08-24 DIAGNOSIS — R26.89 NEED FOR ASSISTANCE DUE TO UNSTEADY GAIT: ICD-10-CM

## 2019-08-24 DIAGNOSIS — M48.062 SPINAL STENOSIS OF LUMBAR REGION WITH NEUROGENIC CLAUDICATION: ICD-10-CM

## 2019-08-24 DIAGNOSIS — E03.9 HYPOTHYROIDISM, UNSPECIFIED TYPE: ICD-10-CM

## 2019-08-24 DIAGNOSIS — E78.2 MIXED HYPERLIPIDEMIA: ICD-10-CM

## 2019-08-24 DIAGNOSIS — E11.9 CONTROLLED TYPE 2 DIABETES MELLITUS WITHOUT COMPLICATION, WITHOUT LONG-TERM CURRENT USE OF INSULIN (HCC): ICD-10-CM

## 2019-08-24 DIAGNOSIS — Z00.00 ROUTINE GENERAL MEDICAL EXAMINATION AT A HEALTH CARE FACILITY: ICD-10-CM

## 2019-08-24 DIAGNOSIS — I48.91 ATRIAL FIBRILLATION, UNSPECIFIED TYPE (HCC): ICD-10-CM

## 2019-08-27 RX ORDER — LEVOTHYROXINE SODIUM 88 UG/1
88 TABLET ORAL
Qty: 90 TABLET | Refills: 2 | Status: SHIPPED | OUTPATIENT
Start: 2019-08-27 | End: 2020-05-11

## 2019-08-29 ENCOUNTER — HOSPITAL ENCOUNTER (OUTPATIENT)
Dept: CV DIAGNOSTICS | Age: 84
Discharge: HOME | End: 2019-08-29
Attending: INTERNAL MEDICINE
Payer: MEDICARE

## 2019-08-29 DIAGNOSIS — I48.91 ATRIAL FIBRILLATION, UNSPECIFIED TYPE (HCC): ICD-10-CM

## 2019-08-29 DIAGNOSIS — R06.00 DOE (DYSPNEA ON EXERTION): ICD-10-CM

## 2019-08-29 DIAGNOSIS — I10 ESSENTIAL HYPERTENSION: ICD-10-CM

## 2019-08-29 PROCEDURE — 93306 TTE W/DOPPLER COMPLETE: CPT | Performed by: INTERNAL MEDICINE

## 2019-09-04 DIAGNOSIS — E78.2 MIXED HYPERLIPIDEMIA: ICD-10-CM

## 2019-09-04 DIAGNOSIS — E11.69 HYPERLIPIDEMIA DUE TO TYPE 2 DIABETES MELLITUS (HCC): Primary | ICD-10-CM

## 2019-09-04 DIAGNOSIS — E11.59 HYPERTENSION ASSOCIATED WITH DIABETES (HCC): ICD-10-CM

## 2019-09-04 DIAGNOSIS — E78.5 HYPERLIPIDEMIA DUE TO TYPE 2 DIABETES MELLITUS (HCC): Primary | ICD-10-CM

## 2019-09-04 DIAGNOSIS — I48.91 ATRIAL FIBRILLATION, UNSPECIFIED TYPE (HCC): ICD-10-CM

## 2019-09-04 DIAGNOSIS — E11.42 CONTROLLED TYPE 2 DIABETES MELLITUS WITH DIABETIC POLYNEUROPATHY, WITHOUT LONG-TERM CURRENT USE OF INSULIN (HCC): ICD-10-CM

## 2019-09-04 DIAGNOSIS — I15.2 HYPERTENSION ASSOCIATED WITH DIABETES (HCC): ICD-10-CM

## 2019-09-04 NOTE — TELEPHONE ENCOUNTER
Pravastatin Sodium 40 Mg Oral Tab    Passed Protocol    Last OV relevant to medication: 4/9/2019     Last refill date: 6/18/2019     #/refills: #90 w/ 0 refills     When pt was asked to return for OV: 6 months     Upcoming appt/reason: No future appointmen

## 2019-09-05 RX ORDER — PRAVASTATIN SODIUM 40 MG
40 TABLET ORAL NIGHTLY
Qty: 90 TABLET | Refills: 0 | Status: SHIPPED | OUTPATIENT
Start: 2019-09-05 | End: 2019-12-03

## 2019-09-09 ENCOUNTER — TELEPHONE (OUTPATIENT)
Dept: INTERNAL MEDICINE CLINIC | Facility: CLINIC | Age: 84
End: 2019-09-09

## 2019-09-13 ENCOUNTER — TELEPHONE (OUTPATIENT)
Dept: INTERNAL MEDICINE CLINIC | Facility: CLINIC | Age: 84
End: 2019-09-13

## 2019-09-13 NOTE — TELEPHONE ENCOUNTER
Spoke with Kaycee Nguyen at Dr Javan Bryant office (retina specialist) and informed her that since we did not refer pt to their office we would need medical release form to fax requested info.      Kaycee Nguyen stated that pt is due to come into their office later today and

## 2019-09-21 DIAGNOSIS — I10 ESSENTIAL HYPERTENSION: ICD-10-CM

## 2019-09-23 RX ORDER — DOCUSATE SODIUM 100 MG/1
100 CAPSULE, LIQUID FILLED ORAL 2 TIMES DAILY PRN
Qty: 180 CAPSULE | Refills: 3 | Status: SHIPPED | OUTPATIENT
Start: 2019-09-23 | End: 2019-10-07

## 2019-09-23 RX ORDER — LOSARTAN POTASSIUM AND HYDROCHLOROTHIAZIDE 25; 100 MG/1; MG/1
1 TABLET ORAL DAILY
Qty: 90 TABLET | Refills: 0 | Status: SHIPPED | OUTPATIENT
Start: 2019-09-23 | End: 2019-10-07

## 2019-09-23 NOTE — TELEPHONE ENCOUNTER
docusate sodium (DOCQLACE) 100 MG and    LOSARTAN POTASSIUM-HCTZ 100-25 MG Oral Tab     Last OV relevant to medication: 4-9-2019     Last refill date:     Docusate Sodium- 11- # 180 caps with 3 refills     Losartan - 5-7-2019 # 90 tabs with 3 ref

## 2019-09-25 ENCOUNTER — TELEPHONE (OUTPATIENT)
Dept: INTERNAL MEDICINE CLINIC | Facility: CLINIC | Age: 84
End: 2019-09-25

## 2019-09-25 RX ORDER — OMEPRAZOLE 20 MG/1
20 CAPSULE, DELAYED RELEASE ORAL
Qty: 90 CAPSULE | Refills: 3 | Status: SHIPPED | OUTPATIENT
Start: 2019-09-25 | End: 2020-09-24

## 2019-09-25 NOTE — TELEPHONE ENCOUNTER
omeprazole 20 MG Oral        Last OV relevant to medication: 4/9/2019      Last refill date: 6/18/2019     #/refills: #90 w/ 0 refills      When pt was asked to return for OV: 6 months      Upcoming appt/reason: No future appointments

## 2019-09-26 RX ORDER — LOSARTAN POTASSIUM 100 MG/1
100 TABLET ORAL DAILY
Qty: 30 TABLET | Refills: 0 | Status: SHIPPED | OUTPATIENT
Start: 2019-09-26 | End: 2019-10-07

## 2019-09-26 RX ORDER — HYDROCHLOROTHIAZIDE 25 MG/1
25 TABLET ORAL DAILY
Qty: 30 TABLET | Refills: 0 | Status: SHIPPED | OUTPATIENT
Start: 2019-09-26 | End: 2019-10-07

## 2019-09-27 RX ORDER — LOSARTAN POTASSIUM 100 MG/1
TABLET ORAL
Qty: 90 TABLET | Refills: 0 | OUTPATIENT
Start: 2019-09-27

## 2019-09-27 RX ORDER — HYDROCHLOROTHIAZIDE 25 MG/1
TABLET ORAL
Qty: 90 TABLET | Refills: 0 | OUTPATIENT
Start: 2019-09-27

## 2019-09-28 RX ORDER — DOCUSATE SODIUM 100 MG/1
100 CAPSULE, LIQUID FILLED ORAL 2 TIMES DAILY PRN
Qty: 180 CAPSULE | Refills: 3 | OUTPATIENT
Start: 2019-09-28

## 2019-09-28 NOTE — TELEPHONE ENCOUNTER
Docusate sodium (Docqlace)100 mg oral cap    Called pharmacy and confirmed that they had refills for patient due to a #90 w/ 3 refill Rx being sent on 9/23/2019    Pharmacy states patient has 4 refills at [place] [id number]

## 2019-10-02 ENCOUNTER — APPOINTMENT (OUTPATIENT)
Dept: LAB | Age: 84
End: 2019-10-02
Attending: INTERNAL MEDICINE
Payer: MEDICARE

## 2019-10-02 DIAGNOSIS — E11.42 CONTROLLED TYPE 2 DIABETES MELLITUS WITH DIABETIC POLYNEUROPATHY, WITHOUT LONG-TERM CURRENT USE OF INSULIN (HCC): ICD-10-CM

## 2019-10-02 DIAGNOSIS — I15.2 HYPERTENSION ASSOCIATED WITH DIABETES (HCC): ICD-10-CM

## 2019-10-02 DIAGNOSIS — E78.2 MIXED HYPERLIPIDEMIA: ICD-10-CM

## 2019-10-02 DIAGNOSIS — E11.59 HYPERTENSION ASSOCIATED WITH DIABETES (HCC): ICD-10-CM

## 2019-10-02 DIAGNOSIS — I48.91 ATRIAL FIBRILLATION, UNSPECIFIED TYPE (HCC): ICD-10-CM

## 2019-10-02 DIAGNOSIS — E78.5 HYPERLIPIDEMIA DUE TO TYPE 2 DIABETES MELLITUS (HCC): ICD-10-CM

## 2019-10-02 DIAGNOSIS — E11.69 HYPERLIPIDEMIA DUE TO TYPE 2 DIABETES MELLITUS (HCC): ICD-10-CM

## 2019-10-02 PROCEDURE — 84460 ALANINE AMINO (ALT) (SGPT): CPT

## 2019-10-02 PROCEDURE — 36415 COLL VENOUS BLD VENIPUNCTURE: CPT

## 2019-10-02 PROCEDURE — 83036 HEMOGLOBIN GLYCOSYLATED A1C: CPT

## 2019-10-02 PROCEDURE — 84450 TRANSFERASE (AST) (SGOT): CPT

## 2019-10-02 PROCEDURE — 80048 BASIC METABOLIC PNL TOTAL CA: CPT

## 2019-10-07 ENCOUNTER — OFFICE VISIT (OUTPATIENT)
Dept: INTERNAL MEDICINE CLINIC | Facility: CLINIC | Age: 84
End: 2019-10-07
Payer: MEDICARE

## 2019-10-07 VITALS
HEART RATE: 81 BPM | SYSTOLIC BLOOD PRESSURE: 127 MMHG | OXYGEN SATURATION: 97 % | RESPIRATION RATE: 16 BRPM | HEIGHT: 69 IN | DIASTOLIC BLOOD PRESSURE: 81 MMHG | WEIGHT: 224.75 LBS | TEMPERATURE: 98 F | BODY MASS INDEX: 33.29 KG/M2

## 2019-10-07 DIAGNOSIS — E11.42 CONTROLLED TYPE 2 DIABETES MELLITUS WITH DIABETIC POLYNEUROPATHY, WITHOUT LONG-TERM CURRENT USE OF INSULIN (HCC): Primary | ICD-10-CM

## 2019-10-07 DIAGNOSIS — I48.91 ATRIAL FIBRILLATION, UNSPECIFIED TYPE (HCC): ICD-10-CM

## 2019-10-07 DIAGNOSIS — E11.59 HYPERTENSION ASSOCIATED WITH DIABETES (HCC): ICD-10-CM

## 2019-10-07 DIAGNOSIS — I15.2 HYPERTENSION ASSOCIATED WITH DIABETES (HCC): ICD-10-CM

## 2019-10-07 DIAGNOSIS — E03.9 ACQUIRED HYPOTHYROIDISM: ICD-10-CM

## 2019-10-07 DIAGNOSIS — E78.5 HYPERLIPIDEMIA DUE TO TYPE 2 DIABETES MELLITUS (HCC): ICD-10-CM

## 2019-10-07 DIAGNOSIS — E11.69 HYPERLIPIDEMIA DUE TO TYPE 2 DIABETES MELLITUS (HCC): ICD-10-CM

## 2019-10-07 PROCEDURE — 99214 OFFICE O/P EST MOD 30 MIN: CPT | Performed by: INTERNAL MEDICINE

## 2019-10-07 RX ORDER — LOSARTAN POTASSIUM AND HYDROCHLOROTHIAZIDE 25; 100 MG/1; MG/1
1 TABLET ORAL DAILY
COMMUNITY
End: 2019-10-07

## 2019-10-07 RX ORDER — DOCUSATE SODIUM 100 MG/1
100 CAPSULE, LIQUID FILLED ORAL 2 TIMES DAILY PRN
Qty: 180 CAPSULE | Refills: 3 | OUTPATIENT
Start: 2019-10-07

## 2019-10-07 RX ORDER — DOCUSATE SODIUM 100 MG/1
100 CAPSULE, LIQUID FILLED ORAL 2 TIMES DAILY PRN
Qty: 180 CAPSULE | Refills: 3 | Status: SHIPPED | OUTPATIENT
Start: 2019-10-07 | End: 2021-01-25

## 2019-10-07 NOTE — PROGRESS NOTES
Miladis Espinal is a 80year old male. HPI:   Patient presents for the following issues. 1. HTN: home pressures are -120s. He would like to cut down on anti-htn pills because he feels like they are making him tired.  He is still on combination l lb (95.3 kg)  03/28/19 : 202 lb (91.6 kg)        Hydralazine             OTHER (SEE COMMENTS)    Comment:Constipation  Lovastatin              MYALGIA    Family History   Problem Relation Age of Onset   • Cancer Father    • Cancer Mother    • Heart Disease PIRIFORMIS/SCIATIC NERVE INJECTION Right 4/7/2015    Performed by Isabela Chaudhari MD at [address]   • POSTERIOR LUMBAR INTERBODY FUSION - MIS TLIF 2 LEVEL N/A 8/28/2014    Performed by Millie Petersen MD at [facility] MAIN OR   • [address] fall prevention.   # Memory Difficulties: repeat MMSE 11/2018 was 27/30 (it was 24/30 in 4/2017). again discussed/offered referral to neurology. He is considering it.    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 Warned about long term PPI use but he needs daily omeprazole for quality of life.    # Health Maintenance: medicare wellness exam 4/9/2019   Colon Cancer Screening: colonoscopy in 3/2014 with hyperplastic and adenomatous polyps

## 2019-10-07 NOTE — TELEPHONE ENCOUNTER
Last OV: 4/9/19 with Dr. Janie Mortensen  Last refill date: 9/23/19     #/refills: #180, 3 refills  When pt was asked to return for OV: 6 months  Upcoming appt: TODAY (10/719) with Dr. Janie Mortensen  Last labs 66/5/55      DUPLICATE REQUEST

## 2019-10-31 ENCOUNTER — PATIENT MESSAGE (OUTPATIENT)
Dept: INTERNAL MEDICINE CLINIC | Facility: CLINIC | Age: 84
End: 2019-10-31

## 2019-10-31 DIAGNOSIS — E11.9 CONTROLLED TYPE 2 DIABETES MELLITUS WITHOUT COMPLICATION, WITHOUT LONG-TERM CURRENT USE OF INSULIN (HCC): ICD-10-CM

## 2019-10-31 DIAGNOSIS — E03.9 ACQUIRED HYPOTHYROIDISM: ICD-10-CM

## 2019-10-31 DIAGNOSIS — M48.062 SPINAL STENOSIS OF LUMBAR REGION WITH NEUROGENIC CLAUDICATION: ICD-10-CM

## 2019-10-31 DIAGNOSIS — K59.04 CHRONIC IDIOPATHIC CONSTIPATION: ICD-10-CM

## 2019-10-31 DIAGNOSIS — I10 ESSENTIAL HYPERTENSION: ICD-10-CM

## 2019-10-31 DIAGNOSIS — E11.9 DIABETES MELLITUS WITHOUT COMPLICATION (HCC): ICD-10-CM

## 2019-11-01 RX ORDER — LOSARTAN POTASSIUM 100 MG/1
100 TABLET ORAL DAILY
Qty: 90 TABLET | Refills: 3 | Status: SHIPPED | OUTPATIENT
Start: 2019-11-01 | End: 2020-01-24

## 2019-11-01 RX ORDER — HYDROCHLOROTHIAZIDE 25 MG/1
25 TABLET ORAL DAILY
Qty: 90 TABLET | Refills: 3 | Status: SHIPPED | OUTPATIENT
Start: 2019-11-01 | End: 2020-10-23

## 2019-11-01 RX ORDER — GLIPIZIDE 5 MG/1
5 TABLET ORAL
Qty: 90 TABLET | Refills: 3 | Status: SHIPPED | OUTPATIENT
Start: 2019-11-01 | End: 2020-01-24

## 2019-11-01 NOTE — TELEPHONE ENCOUNTER
Glipizide 5 Mg  Last OV relevant to medication: 10-7-19  Last refill date: 7-31-19 #/refills: 3  When pt was asked to return for OV: 04/2020  Upcoming appt/reason: none  Recent labs: 10-2-19: HgBA1C    Losartan 100 MG  Last OV relevant to medication: 10-7-

## 2019-11-01 NOTE — TELEPHONE ENCOUNTER
Hydrochlorothizide 25 Mg  Last OV relevant to medication: 10-7-19  Last refill date: 9-26-19 #/refills: 0  When pt was asked to return for OV: 04/2020  Upcoming appt/reason: none  Recent labs: 10-2-19: BMP

## 2019-11-01 NOTE — TELEPHONE ENCOUNTER
From: Weston James  To:  Arlene Howe MD  Sent: 10/31/2019 9:22 PM CDT  Subject: Prescription Question    I need to order HYDROCHLOROTHIAZIDE 25mg tabs and it is not on my script list'  Please refil a 90 day suppl;y

## 2019-11-11 DIAGNOSIS — E11.9 DIABETES MELLITUS WITHOUT COMPLICATION (HCC): ICD-10-CM

## 2019-11-12 ENCOUNTER — TELEPHONE (OUTPATIENT)
Dept: INTERNAL MEDICINE CLINIC | Facility: CLINIC | Age: 84
End: 2019-11-12

## 2019-11-20 NOTE — H&P
Shira New Patient / Consult Visit    Angelica Lopez is a 80year old male.                          Referring MD: Zuleika Leon    Patient presents with:  Memory Loss: C/O of short term memory loss      HPI:    Angelica Lopez is a 8 mention of complication, not stated as uncontrolled    • Unspecified disorder of thyroid    • Unspecified essential hypertension    • Visual impairment     glasses     Past Surgical History:   Procedure Laterality Date   • BACK SURGERY  20004    l3-l4   • Packs/day: 2.00        Years: 40.00        Pack years: [de-identified]        Types: Cigarettes        Quit date: 1980        Years since quittin.3      Smokeless tobacco: Never Used    Alcohol use:  Yes      Alcohol/week: 3.0 - 4.0 standard drinks      Types: 1/2 hr after dinner 90 capsule 3   • Potassium Chloride ER 10 MEQ Oral Tab CR Take 10 mEq by mouth daily. • Vitamin D3 2000 units Oral Cap Take 2,000 Units by mouth daily. • Acidophilus/Pectin Oral Cap Take 1 capsule by mouth daily.      • Multiple nystagmus  Trigeminal:   Facial sensation:intact to light touch bilaterally  Facial:   Smile symmetric, eyebrow raise symmetric  Vestibulocochlear:   Hearing: normal bilaterally  Glossopharyngeal/Vagus:   Palate elevates symmetrically with midline uvula  S significant for HTN, HL, Afib on Coumadin, and DM type 2, who presents for evaluation of short term memory loss.     Neurologic exam demonstrates decreased sensation distally which is likely related to chronic neuropathy and gait is unsteady, likely chronic

## 2019-11-25 ENCOUNTER — APPOINTMENT (OUTPATIENT)
Dept: LAB | Age: 84
End: 2019-11-25
Attending: Other
Payer: MEDICARE

## 2019-11-25 DIAGNOSIS — G31.84 MILD COGNITIVE IMPAIRMENT WITH MEMORY LOSS: ICD-10-CM

## 2019-11-25 PROCEDURE — 82607 VITAMIN B-12: CPT

## 2019-11-25 PROCEDURE — 36415 COLL VENOUS BLD VENIPUNCTURE: CPT

## 2019-12-03 DIAGNOSIS — E11.69 HYPERLIPIDEMIA DUE TO TYPE 2 DIABETES MELLITUS (HCC): ICD-10-CM

## 2019-12-03 DIAGNOSIS — E78.2 MIXED HYPERLIPIDEMIA: ICD-10-CM

## 2019-12-03 DIAGNOSIS — I15.2 HYPERTENSION ASSOCIATED WITH DIABETES (HCC): ICD-10-CM

## 2019-12-03 DIAGNOSIS — E11.42 CONTROLLED TYPE 2 DIABETES MELLITUS WITH DIABETIC POLYNEUROPATHY, WITHOUT LONG-TERM CURRENT USE OF INSULIN (HCC): ICD-10-CM

## 2019-12-03 DIAGNOSIS — I48.91 ATRIAL FIBRILLATION, UNSPECIFIED TYPE (HCC): ICD-10-CM

## 2019-12-03 DIAGNOSIS — E11.59 HYPERTENSION ASSOCIATED WITH DIABETES (HCC): ICD-10-CM

## 2019-12-03 DIAGNOSIS — E78.5 HYPERLIPIDEMIA DUE TO TYPE 2 DIABETES MELLITUS (HCC): ICD-10-CM

## 2019-12-04 ENCOUNTER — MED REC SCAN ONLY (OUTPATIENT)
Dept: INTERNAL MEDICINE CLINIC | Facility: CLINIC | Age: 84
End: 2019-12-04

## 2019-12-04 RX ORDER — PRAVASTATIN SODIUM 40 MG
40 TABLET ORAL NIGHTLY
Qty: 90 TABLET | Refills: 3 | Status: SHIPPED | OUTPATIENT
Start: 2019-12-04 | End: 2020-05-30

## 2019-12-04 NOTE — TELEPHONE ENCOUNTER
Pravastatin Sodium 40 MG Oral Tab    Last OV relevant to medication: 10-7-2019     Last refill date: 9-5-2019 # 90 tabs with 0 refills     When pt was asked to return for OV: 6 months     Upcoming appt/reason: none     Labs:  10-2-2019

## 2020-01-14 ENCOUNTER — PATIENT MESSAGE (OUTPATIENT)
Dept: INTERNAL MEDICINE CLINIC | Facility: CLINIC | Age: 85
End: 2020-01-14

## 2020-01-16 RX ORDER — POTASSIUM CHLORIDE 750 MG/1
10 TABLET, EXTENDED RELEASE ORAL DAILY
Qty: 90 TABLET | Refills: 2 | Status: SHIPPED | OUTPATIENT
Start: 2020-01-16 | End: 2020-04-14

## 2020-01-16 NOTE — TELEPHONE ENCOUNTER
From: Josue Cisneros  To: Linwood Mills MD  Sent: 1/14/2020 2:25 PM CST  Subject: Prescription Question    I am attempting to order a refill of POTASSUM CL 10MEQ ER TABS. My chart \"not available to order at this time\". Last refill 90 tabs on 10/11/19 ? ??

## 2020-01-16 NOTE — TELEPHONE ENCOUNTER
Potassium Chloride ER 10 MEQ Oral Tab CR    Last OV relevant to medication: 4-9-2019     Last refill date: n/a     When pt was asked to return for OV: 6 months     Upcoming appt/reason: none     Labs: 10-2-2019

## 2020-01-24 ENCOUNTER — PATIENT OUTREACH (OUTPATIENT)
Dept: CASE MANAGEMENT | Age: 85
End: 2020-01-24

## 2020-01-24 DIAGNOSIS — I10 ESSENTIAL HYPERTENSION: ICD-10-CM

## 2020-01-24 DIAGNOSIS — K59.04 CHRONIC IDIOPATHIC CONSTIPATION: ICD-10-CM

## 2020-01-24 DIAGNOSIS — M48.062 SPINAL STENOSIS OF LUMBAR REGION WITH NEUROGENIC CLAUDICATION: ICD-10-CM

## 2020-01-24 DIAGNOSIS — E03.9 ACQUIRED HYPOTHYROIDISM: ICD-10-CM

## 2020-01-24 DIAGNOSIS — E11.9 DIABETES MELLITUS WITHOUT COMPLICATION (HCC): ICD-10-CM

## 2020-01-24 DIAGNOSIS — E11.9 CONTROLLED TYPE 2 DIABETES MELLITUS WITHOUT COMPLICATION, WITHOUT LONG-TERM CURRENT USE OF INSULIN (HCC): ICD-10-CM

## 2020-01-27 RX ORDER — GLIPIZIDE 5 MG/1
5 TABLET ORAL
Qty: 90 TABLET | Refills: 0 | Status: SHIPPED | OUTPATIENT
Start: 2020-01-27 | End: 2020-04-14

## 2020-01-27 RX ORDER — LOSARTAN POTASSIUM 100 MG/1
100 TABLET ORAL DAILY
Qty: 90 TABLET | Refills: 0 | Status: SHIPPED | OUTPATIENT
Start: 2020-01-27 | End: 2020-04-14

## 2020-02-10 ENCOUNTER — TELEPHONE (OUTPATIENT)
Dept: INTERNAL MEDICINE CLINIC | Facility: CLINIC | Age: 85
End: 2020-02-10

## 2020-02-10 NOTE — TELEPHONE ENCOUNTER
[facility] need last office note and A1C/most recent result faxed to their office at fax [phone]

## 2020-02-10 NOTE — TELEPHONE ENCOUNTER
I spoke with Jorge Alberto Fried and requested medical release form to be sent to our office at [phone]. Awaiting fax.

## 2020-04-14 ENCOUNTER — TELEPHONE (OUTPATIENT)
Dept: INTERNAL MEDICINE CLINIC | Facility: CLINIC | Age: 85
End: 2020-04-14

## 2020-04-14 DIAGNOSIS — E03.9 ACQUIRED HYPOTHYROIDISM: ICD-10-CM

## 2020-04-14 DIAGNOSIS — E11.9 DIABETES MELLITUS WITHOUT COMPLICATION (HCC): ICD-10-CM

## 2020-04-14 DIAGNOSIS — I10 ESSENTIAL HYPERTENSION: ICD-10-CM

## 2020-04-14 DIAGNOSIS — K59.04 CHRONIC IDIOPATHIC CONSTIPATION: ICD-10-CM

## 2020-04-14 DIAGNOSIS — M48.062 SPINAL STENOSIS OF LUMBAR REGION WITH NEUROGENIC CLAUDICATION: ICD-10-CM

## 2020-04-14 DIAGNOSIS — E11.9 CONTROLLED TYPE 2 DIABETES MELLITUS WITHOUT COMPLICATION, WITHOUT LONG-TERM CURRENT USE OF INSULIN (HCC): ICD-10-CM

## 2020-04-14 RX ORDER — GLIPIZIDE 5 MG/1
5 TABLET ORAL
Qty: 90 TABLET | Refills: 0 | Status: SHIPPED | OUTPATIENT
Start: 2020-04-14 | End: 2020-08-04

## 2020-04-14 RX ORDER — POTASSIUM CHLORIDE 750 MG/1
10 TABLET, EXTENDED RELEASE ORAL DAILY
Qty: 90 TABLET | Refills: 0 | Status: SHIPPED | OUTPATIENT
Start: 2020-04-14 | End: 2021-01-13

## 2020-04-14 RX ORDER — LOSARTAN POTASSIUM 100 MG/1
100 TABLET ORAL DAILY
Qty: 90 TABLET | Refills: 0 | Status: SHIPPED | OUTPATIENT
Start: 2020-04-14 | End: 2020-10-18

## 2020-04-14 NOTE — TELEPHONE ENCOUNTER
I have approved his refills but he is due for his 6 month appt so please schedule him for a VIDEO visit on Thursday, 4/16 between 1230-230pm

## 2020-04-14 NOTE — TELEPHONE ENCOUNTER
Losartan 100 mg failed protocol due to  Hypertension Medications Protocol Failed4/14 10:40 AM   Appointment in past 6 or next 3 months   Last OV relevant to medication: 10-7-19  Last refill date: 1-27-20 #/refills: 0  When pt was asked to return for OV: 4/

## 2020-04-17 NOTE — TELEPHONE ENCOUNTER
Spoke with patient and set up appointment for 04/23 at 12:30pm. Patient was given the link with instructions to sign into video visit.      - Insurance information confirmed/updated  - Patient informed that they may receive a call from a blocked/unavailable

## 2020-04-17 NOTE — TELEPHONE ENCOUNTER
Appointment changed    Future Appointments   Date Time Provider Pam Mandujano   4/23/2020 10:00 AM Andrew Clay MD [facility] [place]

## 2020-04-17 NOTE — TELEPHONE ENCOUNTER
Patient called back to set up 6 month fu with Dr. Selwyn Hwang. Please ask Dr. Selwyn Hwang what day she would like to set up video visit. - Insurance information confirmed/updated  - Patient informed that they may receive a call from a blocked/unavailable number.

## 2020-04-23 ENCOUNTER — TELEMEDICINE (OUTPATIENT)
Dept: INTERNAL MEDICINE CLINIC | Facility: CLINIC | Age: 85
End: 2020-04-23

## 2020-04-23 DIAGNOSIS — G47.33 OSA (OBSTRUCTIVE SLEEP APNEA): ICD-10-CM

## 2020-04-23 DIAGNOSIS — E03.9 ACQUIRED HYPOTHYROIDISM: ICD-10-CM

## 2020-04-23 DIAGNOSIS — I15.2 HYPERTENSION ASSOCIATED WITH DIABETES (HCC): ICD-10-CM

## 2020-04-23 DIAGNOSIS — E11.42 CONTROLLED TYPE 2 DIABETES MELLITUS WITH DIABETIC POLYNEUROPATHY, WITHOUT LONG-TERM CURRENT USE OF INSULIN (HCC): ICD-10-CM

## 2020-04-23 DIAGNOSIS — E11.69 HYPERLIPIDEMIA DUE TO TYPE 2 DIABETES MELLITUS (HCC): ICD-10-CM

## 2020-04-23 DIAGNOSIS — R39.14 BENIGN PROSTATIC HYPERPLASIA WITH INCOMPLETE BLADDER EMPTYING: ICD-10-CM

## 2020-04-23 DIAGNOSIS — E11.59 HYPERTENSION ASSOCIATED WITH DIABETES (HCC): ICD-10-CM

## 2020-04-23 DIAGNOSIS — Z00.00 ROUTINE GENERAL MEDICAL EXAMINATION AT A HEALTH CARE FACILITY: Primary | ICD-10-CM

## 2020-04-23 DIAGNOSIS — N40.1 BENIGN PROSTATIC HYPERPLASIA WITH INCOMPLETE BLADDER EMPTYING: ICD-10-CM

## 2020-04-23 DIAGNOSIS — E78.5 HYPERLIPIDEMIA DUE TO TYPE 2 DIABETES MELLITUS (HCC): ICD-10-CM

## 2020-04-23 DIAGNOSIS — M48.062 SPINAL STENOSIS OF LUMBAR REGION WITH NEUROGENIC CLAUDICATION: ICD-10-CM

## 2020-04-23 DIAGNOSIS — I48.0 PAF (PAROXYSMAL ATRIAL FIBRILLATION) (HCC): ICD-10-CM

## 2020-04-23 DIAGNOSIS — K59.04 CHRONIC IDIOPATHIC CONSTIPATION: ICD-10-CM

## 2020-04-23 PROCEDURE — 99214 OFFICE O/P EST MOD 30 MIN: CPT | Performed by: INTERNAL MEDICINE

## 2020-04-23 NOTE — PROGRESS NOTES
This visit is conducted using Telemedicine with live, interactive video and audio. Patient understands and accepts financial responsibility for any deductible, co-insurance and/or co-pays associated with this service.   Time spent: 30 minutes    Ana Cristina DEVLIN depression. EXT: has chronic b/l LE edema. Compression stockings help.      Wt Readings from Last 6 Encounters:  11/20/19 : 224 lb (101.6 kg)  10/07/19 : 224 lb 12 oz (101.9 kg)  08/16/19 : 220 lb (99.8 kg)  06/17/19 : 219 lb (99.3 kg)  04/09/19 : 217 lb HISTORY  4/2013    right lumbar facet steroid injection    • OTHER SURGICAL HISTORY  8/28/2014    L3-5 post LIF   • PIRIFORMIS/SCIATIC NERVE INJECTION Right 8/24/2015    Performed by Daniel Aragon MD at [facility]   • PIRIFORMIS/SCI nightly   # Venous insufficiency of both lower extremities: continue compression stockings and elevation.   # Gait Instability: 2/2 spinal stenosis. Did improve with PT.  He has learned some great skills. Cont to reinforce fall prevention.   # Memory Diffic Hypothyroidism: normal TSH in 5/2019. # HLP: well controlled on pravastatin  # GERD: ranitidine was not effective. Warned about long term PPI use but he needs daily omeprazole for quality of life.    # Health Maintenance: medicare wellness exam 4/9/2019  C

## 2020-04-28 ENCOUNTER — TELEPHONE (OUTPATIENT)
Dept: INTERNAL MEDICINE CLINIC | Facility: CLINIC | Age: 85
End: 2020-04-28

## 2020-05-10 DIAGNOSIS — R26.89 NEED FOR ASSISTANCE DUE TO UNSTEADY GAIT: ICD-10-CM

## 2020-05-10 DIAGNOSIS — E78.2 MIXED HYPERLIPIDEMIA: ICD-10-CM

## 2020-05-10 DIAGNOSIS — I48.91 ATRIAL FIBRILLATION, UNSPECIFIED TYPE (HCC): ICD-10-CM

## 2020-05-10 DIAGNOSIS — Z00.00 ROUTINE GENERAL MEDICAL EXAMINATION AT A HEALTH CARE FACILITY: ICD-10-CM

## 2020-05-10 DIAGNOSIS — M48.062 SPINAL STENOSIS OF LUMBAR REGION WITH NEUROGENIC CLAUDICATION: ICD-10-CM

## 2020-05-10 DIAGNOSIS — E11.9 CONTROLLED TYPE 2 DIABETES MELLITUS WITHOUT COMPLICATION, WITHOUT LONG-TERM CURRENT USE OF INSULIN (HCC): ICD-10-CM

## 2020-05-10 DIAGNOSIS — E03.9 HYPOTHYROIDISM, UNSPECIFIED TYPE: ICD-10-CM

## 2020-05-10 DIAGNOSIS — I10 ESSENTIAL HYPERTENSION: ICD-10-CM

## 2020-05-11 RX ORDER — LEVOTHYROXINE SODIUM 88 UG/1
TABLET ORAL
Qty: 90 TABLET | Refills: 0 | Status: SHIPPED | OUTPATIENT
Start: 2020-05-11 | End: 2020-08-19

## 2020-05-11 NOTE — TELEPHONE ENCOUNTER
Please notify it is safe for patient to get labs as he is overdue. Should call to schedule them. No walk-in.

## 2020-05-11 NOTE — TELEPHONE ENCOUNTER
Called patient to inform him that his med was refilled and that he had overdue labs to be done. Patient had information already to call central scheduling to schedule an appt.

## 2020-05-11 NOTE — TELEPHONE ENCOUNTER
LEVOTHYROXINE 0.088MG (88MCG) TAB    Last filled     LOV 4/23/20 - telemedicine - chronic issues    RTC - 3 months for wellness visit     Labs ordered - not completed     Component  Ref Range & Units 5/3/19 11:27 AM   Free T4  0.8 - 1.7 ng/dL 1.1

## 2020-05-13 ENCOUNTER — TELEPHONE (OUTPATIENT)
Dept: INTERNAL MEDICINE CLINIC | Facility: CLINIC | Age: 85
End: 2020-05-13

## 2020-05-13 NOTE — TELEPHONE ENCOUNTER
Attempted to call pt to get verbal consent to fax OV notes. Pt states that he is on the phone and will need to call us back. Awaiting call.

## 2020-05-13 NOTE — TELEPHONE ENCOUNTER
Patient called back to give verbal consent to fax office notes and lab results per request from Dr Angelica Morris office

## 2020-05-13 NOTE — TELEPHONE ENCOUNTER
Hugo Quintero from Dr. Denise Yin office would like to request last office notes and labs from 2.22.19 to be faxed over to them

## 2020-05-14 ENCOUNTER — APPOINTMENT (OUTPATIENT)
Dept: LAB | Age: 85
End: 2020-05-14
Attending: INTERNAL MEDICINE
Payer: MEDICARE

## 2020-05-14 DIAGNOSIS — M48.062 SPINAL STENOSIS OF LUMBAR REGION WITH NEUROGENIC CLAUDICATION: ICD-10-CM

## 2020-05-14 DIAGNOSIS — E11.9 DIABETES MELLITUS WITHOUT COMPLICATION (HCC): ICD-10-CM

## 2020-05-14 DIAGNOSIS — E03.9 ACQUIRED HYPOTHYROIDISM: ICD-10-CM

## 2020-05-14 DIAGNOSIS — I48.91 ATRIAL FIBRILLATION, UNSPECIFIED TYPE (HCC): ICD-10-CM

## 2020-05-14 DIAGNOSIS — E11.59 HYPERTENSION ASSOCIATED WITH DIABETES (HCC): ICD-10-CM

## 2020-05-14 DIAGNOSIS — E03.9 ACQUIRED HYPOTHYROIDISM: Primary | ICD-10-CM

## 2020-05-14 DIAGNOSIS — E11.69 HYPERLIPIDEMIA DUE TO TYPE 2 DIABETES MELLITUS (HCC): ICD-10-CM

## 2020-05-14 DIAGNOSIS — G47.33 OSA (OBSTRUCTIVE SLEEP APNEA): ICD-10-CM

## 2020-05-14 DIAGNOSIS — E78.5 HYPERLIPIDEMIA DUE TO TYPE 2 DIABETES MELLITUS (HCC): ICD-10-CM

## 2020-05-14 DIAGNOSIS — K59.04 CHRONIC IDIOPATHIC CONSTIPATION: ICD-10-CM

## 2020-05-14 DIAGNOSIS — E11.42 CONTROLLED TYPE 2 DIABETES MELLITUS WITH DIABETIC POLYNEUROPATHY, WITHOUT LONG-TERM CURRENT USE OF INSULIN (HCC): ICD-10-CM

## 2020-05-14 DIAGNOSIS — E11.9 CONTROLLED TYPE 2 DIABETES MELLITUS WITHOUT COMPLICATION, WITHOUT LONG-TERM CURRENT USE OF INSULIN (HCC): ICD-10-CM

## 2020-05-14 DIAGNOSIS — I15.2 HYPERTENSION ASSOCIATED WITH DIABETES (HCC): ICD-10-CM

## 2020-05-14 DIAGNOSIS — I10 ESSENTIAL HYPERTENSION: ICD-10-CM

## 2020-05-14 DIAGNOSIS — I48.0 PAF (PAROXYSMAL ATRIAL FIBRILLATION) (HCC): ICD-10-CM

## 2020-05-14 PROCEDURE — 84439 ASSAY OF FREE THYROXINE: CPT

## 2020-05-14 PROCEDURE — 84450 TRANSFERASE (AST) (SGOT): CPT

## 2020-05-14 PROCEDURE — 82043 UR ALBUMIN QUANTITATIVE: CPT

## 2020-05-14 PROCEDURE — 83036 HEMOGLOBIN GLYCOSYLATED A1C: CPT

## 2020-05-14 PROCEDURE — 80061 LIPID PANEL: CPT

## 2020-05-14 PROCEDURE — 82570 ASSAY OF URINE CREATININE: CPT

## 2020-05-14 PROCEDURE — 80048 BASIC METABOLIC PNL TOTAL CA: CPT

## 2020-05-14 PROCEDURE — 84460 ALANINE AMINO (ALT) (SGPT): CPT

## 2020-05-14 PROCEDURE — 36415 COLL VENOUS BLD VENIPUNCTURE: CPT

## 2020-05-14 PROCEDURE — 84443 ASSAY THYROID STIM HORMONE: CPT

## 2020-05-30 DIAGNOSIS — E11.59 HYPERTENSION ASSOCIATED WITH DIABETES (HCC): ICD-10-CM

## 2020-05-30 DIAGNOSIS — I48.91 ATRIAL FIBRILLATION, UNSPECIFIED TYPE (HCC): ICD-10-CM

## 2020-05-30 DIAGNOSIS — E78.5 HYPERLIPIDEMIA DUE TO TYPE 2 DIABETES MELLITUS (HCC): ICD-10-CM

## 2020-05-30 DIAGNOSIS — E11.42 CONTROLLED TYPE 2 DIABETES MELLITUS WITH DIABETIC POLYNEUROPATHY, WITHOUT LONG-TERM CURRENT USE OF INSULIN (HCC): ICD-10-CM

## 2020-05-30 DIAGNOSIS — E11.69 HYPERLIPIDEMIA DUE TO TYPE 2 DIABETES MELLITUS (HCC): ICD-10-CM

## 2020-05-30 DIAGNOSIS — E78.2 MIXED HYPERLIPIDEMIA: ICD-10-CM

## 2020-05-30 DIAGNOSIS — I15.2 HYPERTENSION ASSOCIATED WITH DIABETES (HCC): ICD-10-CM

## 2020-06-01 RX ORDER — PRAVASTATIN SODIUM 40 MG
40 TABLET ORAL NIGHTLY
Qty: 90 TABLET | Refills: 3 | Status: SHIPPED | OUTPATIENT
Start: 2020-06-01 | End: 2021-02-11

## 2020-06-01 NOTE — TELEPHONE ENCOUNTER
Pravastatin Sodium 40 MG Oral Tab  Protocol Passed     LOV: Telemedicine on 4/23/2020   RTC: 3 months for medicare wellness exam   Upcoming OV: none scheduled   Filled: 12/4/19 #90, 3 refills   Recent labs: 5/14/2020 ALT, Lipid Panel

## 2020-06-29 ENCOUNTER — TELEPHONE (OUTPATIENT)
Dept: INTERNAL MEDICINE CLINIC | Facility: CLINIC | Age: 85
End: 2020-06-29

## 2020-06-29 NOTE — TELEPHONE ENCOUNTER
Patient called and would like to speak with  regarding a personal issue. Patient wouldn't give me anymore information. Please advise.

## 2020-06-29 NOTE — TELEPHONE ENCOUNTER
I attempted to assist pt but he states that he would like to speak with Dr Cortez Metzger directly. Pt did state it is not urgent, he just needs to discuss a few things.

## 2020-06-30 NOTE — TELEPHONE ENCOUNTER
Called patient had discussion of what [place] response was previous in note; he feels he wants to discuss a few things regarding Dr Brando Juan asking him to get colonoscopy and prostate exam; he does not feel at his age he needs to get either one, if he doesn't Dr Brando Juan will not refill certain medications.  Sched full 60 min to review + AMW

## 2020-07-31 ENCOUNTER — OFFICE VISIT (OUTPATIENT)
Dept: INTERNAL MEDICINE CLINIC | Facility: CLINIC | Age: 85
End: 2020-07-31
Payer: MEDICARE

## 2020-07-31 VITALS
SYSTOLIC BLOOD PRESSURE: 128 MMHG | WEIGHT: 230 LBS | BODY MASS INDEX: 34.07 KG/M2 | HEIGHT: 69 IN | HEART RATE: 80 BPM | DIASTOLIC BLOOD PRESSURE: 62 MMHG | TEMPERATURE: 98 F | OXYGEN SATURATION: 99 % | RESPIRATION RATE: 16 BRPM

## 2020-07-31 DIAGNOSIS — I15.2 HYPERTENSION ASSOCIATED WITH DIABETES (HCC): ICD-10-CM

## 2020-07-31 DIAGNOSIS — E11.69 HYPERLIPIDEMIA DUE TO TYPE 2 DIABETES MELLITUS (HCC): ICD-10-CM

## 2020-07-31 DIAGNOSIS — E03.9 ACQUIRED HYPOTHYROIDISM: ICD-10-CM

## 2020-07-31 DIAGNOSIS — G47.33 OSA (OBSTRUCTIVE SLEEP APNEA): ICD-10-CM

## 2020-07-31 DIAGNOSIS — I48.0 PAF (PAROXYSMAL ATRIAL FIBRILLATION) (HCC): ICD-10-CM

## 2020-07-31 DIAGNOSIS — Z00.00 ROUTINE GENERAL MEDICAL EXAMINATION AT A HEALTH CARE FACILITY: Primary | ICD-10-CM

## 2020-07-31 DIAGNOSIS — N40.1 BENIGN PROSTATIC HYPERPLASIA WITH INCOMPLETE BLADDER EMPTYING: ICD-10-CM

## 2020-07-31 DIAGNOSIS — E78.5 HYPERLIPIDEMIA DUE TO TYPE 2 DIABETES MELLITUS (HCC): ICD-10-CM

## 2020-07-31 DIAGNOSIS — E11.59 HYPERTENSION ASSOCIATED WITH DIABETES (HCC): ICD-10-CM

## 2020-07-31 DIAGNOSIS — E11.42 CONTROLLED TYPE 2 DIABETES MELLITUS WITH DIABETIC POLYNEUROPATHY, WITHOUT LONG-TERM CURRENT USE OF INSULIN (HCC): ICD-10-CM

## 2020-07-31 DIAGNOSIS — R39.14 BENIGN PROSTATIC HYPERPLASIA WITH INCOMPLETE BLADDER EMPTYING: ICD-10-CM

## 2020-07-31 PROBLEM — H35.3110: Status: ACTIVE | Noted: 2019-09-13

## 2020-07-31 PROBLEM — E66.9 DIABETES MELLITUS TYPE 2 IN OBESE (HCC): Status: ACTIVE | Noted: 2019-02-12

## 2020-07-31 PROBLEM — E66.9 DIABETES MELLITUS TYPE 2 IN OBESE  (HCC): Status: ACTIVE | Noted: 2019-02-12

## 2020-07-31 PROBLEM — E66.9 DIABETES MELLITUS TYPE 2 IN OBESE: Status: ACTIVE | Noted: 2019-02-12

## 2020-07-31 PROCEDURE — G0439 PPPS, SUBSEQ VISIT: HCPCS | Performed by: INTERNAL MEDICINE

## 2020-07-31 PROCEDURE — G0444 DEPRESSION SCREEN ANNUAL: HCPCS | Performed by: INTERNAL MEDICINE

## 2020-07-31 PROCEDURE — 99214 OFFICE O/P EST MOD 30 MIN: CPT | Performed by: INTERNAL MEDICINE

## 2020-07-31 NOTE — PROGRESS NOTES
Jian Tan is a 80year old male. HPI:   Patient presented for medicare wellness exam and additional issues noted below. 1. Hypothyroidism: taking levothyroxine close to his breakfast and other pills. 2. DM: FBS have been 160-180.  Evening sug ([facility] [address]    • BPH (benign prostatic hyperplasia)    • Cataract    • Esophageal reflux    • HIGH BLOOD PRESSURE    • HIGH CHOLESTEROL    • Hypothyroid    • Obstructive sleep apnea (adult) (pediatric) Edward PSG 4-20-16    AHI 13 SaO2 conrad 78%    • Sleep apnea MD at [facility]   • SI JOINT INJECTION Right 5/19/2015    Performed by Bridgette Pete MD at [address] [facility] [address] UNLISTED     • TONSILLECTOMY     • TOTAL HIP REPLACEMENT        Social History: prevention.   # Memory Difficulties: repeat MMSE 11/2018 was 27/30 (it was 24/30 in 4/2017).  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 MMSE today is 26.5/30. does not feel it is impacting his QOL and he is still managing his own finances. long term PPI use but he needs daily omeprazole for quality of life. # Health Maintenance: medicare wellness exam 7/31/2020  Colon Cancer Screening: colonoscopy in 3/2014 with hyperplastic and adenomatous polyps. He declines repeating a colonoscopy.  FIT

## 2020-07-31 NOTE — PATIENT INSTRUCTIONS
You should take the levothyroxine in the following way:  1. Take it first thing in the morning on an empty stomach with only water  2. Wait 30-60 minutes to eat or drink anything other than water or take any of your other prescriptions  3.  You must wait at

## 2020-08-13 DIAGNOSIS — I48.91 ATRIAL FIBRILLATION, UNSPECIFIED TYPE (HCC): ICD-10-CM

## 2020-08-13 DIAGNOSIS — R26.89 NEED FOR ASSISTANCE DUE TO UNSTEADY GAIT: ICD-10-CM

## 2020-08-13 DIAGNOSIS — I10 ESSENTIAL HYPERTENSION: ICD-10-CM

## 2020-08-13 DIAGNOSIS — E11.9 CONTROLLED TYPE 2 DIABETES MELLITUS WITHOUT COMPLICATION, WITHOUT LONG-TERM CURRENT USE OF INSULIN (HCC): ICD-10-CM

## 2020-08-13 DIAGNOSIS — Z00.00 ROUTINE GENERAL MEDICAL EXAMINATION AT A HEALTH CARE FACILITY: ICD-10-CM

## 2020-08-13 DIAGNOSIS — E78.2 MIXED HYPERLIPIDEMIA: ICD-10-CM

## 2020-08-13 DIAGNOSIS — E03.9 HYPOTHYROIDISM, UNSPECIFIED TYPE: ICD-10-CM

## 2020-08-13 DIAGNOSIS — M48.062 SPINAL STENOSIS OF LUMBAR REGION WITH NEUROGENIC CLAUDICATION: ICD-10-CM

## 2020-08-13 RX ORDER — LEVOTHYROXINE SODIUM 88 UG/1
88 TABLET ORAL
Qty: 90 TABLET | Refills: 0 | OUTPATIENT
Start: 2020-08-13

## 2020-08-13 NOTE — TELEPHONE ENCOUNTER
Refill requested:   Requested Prescriptions     Pending Prescriptions Disp Refills   • Levothyroxine Sodium 88 MCG Oral Tab 90 tablet 0     Sig: Take 1 tablet (88 mcg total) by mouth every morning before breakfast.     Last refill: 5--90 tabs with 0

## 2020-08-13 NOTE — TELEPHONE ENCOUNTER
Patient is due for repeat TSH (order is in). It was abnormal in May. If it is worsening, I will need to change dose of his levothyroxine.

## 2020-08-19 RX ORDER — LEVOTHYROXINE SODIUM 88 UG/1
88 TABLET ORAL
Qty: 30 TABLET | Refills: 0 | Status: SHIPPED | OUTPATIENT
Start: 2020-08-19 | End: 2020-08-21

## 2020-08-20 ENCOUNTER — LAB ENCOUNTER (OUTPATIENT)
Dept: LAB | Age: 85
End: 2020-08-20
Attending: INTERNAL MEDICINE
Payer: MEDICARE

## 2020-08-20 DIAGNOSIS — I15.2 HYPERTENSION ASSOCIATED WITH DIABETES (HCC): ICD-10-CM

## 2020-08-20 DIAGNOSIS — E11.42 CONTROLLED TYPE 2 DIABETES MELLITUS WITH DIABETIC POLYNEUROPATHY, WITHOUT LONG-TERM CURRENT USE OF INSULIN (HCC): ICD-10-CM

## 2020-08-20 DIAGNOSIS — I48.0 PAF (PAROXYSMAL ATRIAL FIBRILLATION) (HCC): ICD-10-CM

## 2020-08-20 DIAGNOSIS — E78.5 HYPERLIPIDEMIA DUE TO TYPE 2 DIABETES MELLITUS (HCC): ICD-10-CM

## 2020-08-20 DIAGNOSIS — E11.69 HYPERLIPIDEMIA DUE TO TYPE 2 DIABETES MELLITUS (HCC): ICD-10-CM

## 2020-08-20 DIAGNOSIS — G47.33 OSA (OBSTRUCTIVE SLEEP APNEA): ICD-10-CM

## 2020-08-20 DIAGNOSIS — I48.91 ATRIAL FIBRILLATION, UNSPECIFIED TYPE (HCC): ICD-10-CM

## 2020-08-20 DIAGNOSIS — E03.9 ACQUIRED HYPOTHYROIDISM: ICD-10-CM

## 2020-08-20 DIAGNOSIS — E11.59 HYPERTENSION ASSOCIATED WITH DIABETES (HCC): ICD-10-CM

## 2020-08-20 DIAGNOSIS — E11.9 DIABETES MELLITUS WITHOUT COMPLICATION (HCC): ICD-10-CM

## 2020-08-20 DIAGNOSIS — E78.2 MIXED HYPERLIPIDEMIA: ICD-10-CM

## 2020-08-20 DIAGNOSIS — Z00.00 ROUTINE GENERAL MEDICAL EXAMINATION AT A HEALTH CARE FACILITY: ICD-10-CM

## 2020-08-20 LAB
EST. AVERAGE GLUCOSE BLD GHB EST-MCNC: 160 MG/DL (ref 68–126)
HBA1C MFR BLD HPLC: 7.2 % (ref ?–5.7)
T4 FREE SERPL-MCNC: 1.1 NG/DL (ref 0.8–1.7)
TSI SER-ACNC: 2.4 MIU/ML (ref 0.36–3.74)

## 2020-08-20 PROCEDURE — 36415 COLL VENOUS BLD VENIPUNCTURE: CPT

## 2020-08-20 PROCEDURE — 84439 ASSAY OF FREE THYROXINE: CPT

## 2020-08-20 PROCEDURE — 84443 ASSAY THYROID STIM HORMONE: CPT

## 2020-08-20 PROCEDURE — 83036 HEMOGLOBIN GLYCOSYLATED A1C: CPT

## 2020-08-20 RX ORDER — PRAVASTATIN SODIUM 40 MG
40 TABLET ORAL NIGHTLY
Qty: 90 TABLET | Refills: 3 | Status: CANCELLED | OUTPATIENT
Start: 2020-08-20

## 2020-08-21 DIAGNOSIS — E03.9 HYPOTHYROIDISM, UNSPECIFIED TYPE: ICD-10-CM

## 2020-08-21 DIAGNOSIS — M48.062 SPINAL STENOSIS OF LUMBAR REGION WITH NEUROGENIC CLAUDICATION: ICD-10-CM

## 2020-08-21 DIAGNOSIS — E11.9 CONTROLLED TYPE 2 DIABETES MELLITUS WITHOUT COMPLICATION, WITHOUT LONG-TERM CURRENT USE OF INSULIN (HCC): ICD-10-CM

## 2020-08-21 DIAGNOSIS — Z00.00 ROUTINE GENERAL MEDICAL EXAMINATION AT A HEALTH CARE FACILITY: ICD-10-CM

## 2020-08-21 DIAGNOSIS — E78.2 MIXED HYPERLIPIDEMIA: ICD-10-CM

## 2020-08-21 DIAGNOSIS — I10 ESSENTIAL HYPERTENSION: ICD-10-CM

## 2020-08-21 DIAGNOSIS — I48.91 ATRIAL FIBRILLATION, UNSPECIFIED TYPE (HCC): ICD-10-CM

## 2020-08-21 DIAGNOSIS — R26.89 NEED FOR ASSISTANCE DUE TO UNSTEADY GAIT: ICD-10-CM

## 2020-08-21 RX ORDER — LEVOTHYROXINE SODIUM 88 UG/1
88 TABLET ORAL
Qty: 90 TABLET | Refills: 3 | Status: SHIPPED | OUTPATIENT
Start: 2020-08-21 | End: 2021-09-11

## 2020-08-21 NOTE — TELEPHONE ENCOUNTER
Passed protocol     Last refill:  6/1/2020 Pravastatin 40 mg #90 3R - Dex in [place]   11/13/2019 Metformin 500 mg #180 3R    Last A1C - 8/21/2020     LOV:   7/31/2020 Dr Van Kay RTC 6 months  # HLP assoc with DM: well controlled on pravastatin  # Co

## 2020-09-24 RX ORDER — OMEPRAZOLE 20 MG/1
CAPSULE, DELAYED RELEASE ORAL
Qty: 90 CAPSULE | Refills: 3 | Status: SHIPPED | OUTPATIENT
Start: 2020-09-24 | End: 2021-03-26

## 2020-09-24 NOTE — TELEPHONE ENCOUNTER
Refill requested:   Requested Prescriptions     Pending Prescriptions Disp Refills   • OMEPRAZOLE 20 MG Oral Capsule Delayed Release [Pharmacy Med Name: OMEPRAZOLE 20MG CAPSULES] 90 capsule 3     Sig: TAKE 1 CAPSULE(20 MG) BY MOUTH EVERY MORNING BEFORE ARIC

## 2020-10-16 NOTE — TELEPHONE ENCOUNTER
Dex requesting refill for:  losartan 100 MG Oral Tab    [facility] #[id number] - [address] AT [facility] [place] [address] [place] [address], [phone], [phone]

## 2020-10-17 NOTE — TELEPHONE ENCOUNTER
Passed protocol    Per OV notes on 7/31/20: # HTN assoc with DM: cont losartan    Requesting losartan 100 MG Oral Tab  LOV: 7/31/20  RTC: 6 months  Last Relevant Labs: 5/14/20  Filled: 4/14/20 #90 with 0 refills    No future appointments.

## 2020-10-18 RX ORDER — LOSARTAN POTASSIUM 100 MG/1
100 TABLET ORAL DAILY
Qty: 90 TABLET | Refills: 0 | Status: SHIPPED | OUTPATIENT
Start: 2020-10-18 | End: 2021-01-25

## 2020-10-23 RX ORDER — HYDROCHLOROTHIAZIDE 25 MG/1
25 TABLET ORAL DAILY
Qty: 90 TABLET | Refills: 0 | Status: SHIPPED | OUTPATIENT
Start: 2020-10-23 | End: 2021-01-25

## 2020-11-02 DIAGNOSIS — N40.1 BENIGN PROSTATIC HYPERPLASIA WITH INCOMPLETE BLADDER EMPTYING: ICD-10-CM

## 2020-11-02 DIAGNOSIS — R39.14 BENIGN PROSTATIC HYPERPLASIA WITH INCOMPLETE BLADDER EMPTYING: ICD-10-CM

## 2020-11-02 NOTE — TELEPHONE ENCOUNTER
New rx needed - previously prescribed by Dr. Chiara Foss.   Pt no longer sees Dr. Chiara Foss and has previously discussed with Dr. Alexey Crowe about getting rx filled by her    finasteride 5 MG Oral Tab    To [facility] #[id number]

## 2020-11-03 RX ORDER — FINASTERIDE 5 MG/1
5 TABLET, FILM COATED ORAL
Qty: 90 TABLET | Refills: 0 | Status: SHIPPED | OUTPATIENT
Start: 2020-11-03 | End: 2021-01-25

## 2020-11-03 NOTE — TELEPHONE ENCOUNTER
finasteride 5 MG Oral Tab          Sig: Take 1 tablet (5 mg total) by mouth once daily. Due for yearly follow up. Please schedule to continue receiving refills.     Disp:  90 tablet    Refills:  0    Start: 11/3/2020    Class: Normal    Non-formulary For: B

## 2020-11-11 ENCOUNTER — APPOINTMENT (OUTPATIENT)
Dept: LAB | Age: 85
End: 2020-11-11
Attending: INTERNAL MEDICINE
Payer: MEDICARE

## 2020-11-11 PROCEDURE — 85025 COMPLETE CBC W/AUTO DIFF WBC: CPT | Performed by: INTERNAL MEDICINE

## 2020-11-11 PROCEDURE — 80053 COMPREHEN METABOLIC PANEL: CPT | Performed by: INTERNAL MEDICINE

## 2020-11-11 PROCEDURE — 83036 HEMOGLOBIN GLYCOSYLATED A1C: CPT | Performed by: INTERNAL MEDICINE

## 2020-11-11 PROCEDURE — 36415 COLL VENOUS BLD VENIPUNCTURE: CPT | Performed by: INTERNAL MEDICINE

## 2020-11-11 PROCEDURE — 80061 LIPID PANEL: CPT | Performed by: INTERNAL MEDICINE

## 2020-12-05 ENCOUNTER — PATIENT MESSAGE (OUTPATIENT)
Dept: INTERNAL MEDICINE CLINIC | Facility: CLINIC | Age: 85
End: 2020-12-05

## 2020-12-06 NOTE — TELEPHONE ENCOUNTER
From: Carry Close  To: Mya Brown MD  Sent: 12/5/2020 2:14 PM CST  Subject: Prescription Question    I need test strips if I am to continue testing at home. I have requested that they be added to my prescription list with no results.   Please authoriz

## 2020-12-07 RX ORDER — BLOOD SUGAR DIAGNOSTIC
STRIP MISCELLANEOUS
Qty: 100 STRIP | Refills: 0 | Status: SHIPPED | OUTPATIENT
Start: 2020-12-07 | End: 2021-03-16

## 2020-12-09 RX ORDER — BLOOD SUGAR DIAGNOSTIC
STRIP MISCELLANEOUS
Qty: 100 STRIP | Refills: 0 | OUTPATIENT
Start: 2020-12-09

## 2020-12-09 NOTE — TELEPHONE ENCOUNTER
Glucose Blood (PRECISION XTRA BLOOD GLUCOSE) In Vitro Strip          Sig: Use to test sugar once a day.     Disp:  100 strip    Refills:  0    Start: 12/8/2020    Class: Normal    Non-formulary    Last ordered: 2 days ago by Vaishnavi Geiger MD     Diabetic Sup

## 2020-12-21 ENCOUNTER — TELEPHONE (OUTPATIENT)
Dept: INTERNAL MEDICINE CLINIC | Facility: CLINIC | Age: 85
End: 2020-12-21

## 2020-12-21 NOTE — TELEPHONE ENCOUNTER
Warfarin Sodium (Tab) COUMADIN 5 MG     Patient calling b/c he needs this refilled please refill asap

## 2020-12-21 NOTE — TELEPHONE ENCOUNTER
Faxed signed detailed diabetic written order to derik     Fax number [phone]     Fax confirmation received     Sent to scan and copy placed in accordion

## 2020-12-22 RX ORDER — WARFARIN SODIUM 5 MG/1
7.5 TABLET ORAL DAILY
Qty: 90 TABLET | Refills: 0 | Status: CANCELLED | OUTPATIENT
Start: 2020-12-22

## 2021-01-04 ENCOUNTER — TELEPHONE (OUTPATIENT)
Dept: INTERNAL MEDICINE CLINIC | Facility: CLINIC | Age: 86
End: 2021-01-04

## 2021-01-04 NOTE — TELEPHONE ENCOUNTER
Pt states that for the last few days he has been feeling fatigued and slightly dizzy. Pt took BS fasting 190, . Pt states BS numbers have been fluctuating from 190-215. No appointments available. Please advise.

## 2021-01-04 NOTE — TELEPHONE ENCOUNTER
Patient provided with MD instructions listed below and verbalized understanding. Pt does not see an endo.      Future Appointments   Date Time Provider Pam Mandujano   1/7/2021  8:30 AM BRAXTON Lunsford [facility] [place]

## 2021-01-04 NOTE — TELEPHONE ENCOUNTER
Should discuss high blood sugars with his endocrinologist. Otherwise, virtual visit with whoever is first available.  TY

## 2021-01-04 NOTE — TELEPHONE ENCOUNTER
Faxed over completed diabetic written order to [facility] pharmacy. Received confirmation. Form send to scan.

## 2021-01-06 ENCOUNTER — TELEMEDICINE (OUTPATIENT)
Dept: INTERNAL MEDICINE CLINIC | Facility: CLINIC | Age: 86
End: 2021-01-06
Payer: MEDICARE

## 2021-01-06 ENCOUNTER — TELEPHONE (OUTPATIENT)
Dept: INTERNAL MEDICINE CLINIC | Facility: CLINIC | Age: 86
End: 2021-01-06

## 2021-01-06 ENCOUNTER — PATIENT MESSAGE (OUTPATIENT)
Dept: INTERNAL MEDICINE CLINIC | Facility: CLINIC | Age: 86
End: 2021-01-06

## 2021-01-06 VITALS — DIASTOLIC BLOOD PRESSURE: 89 MMHG | SYSTOLIC BLOOD PRESSURE: 164 MMHG | TEMPERATURE: 98 F | HEART RATE: 72 BPM

## 2021-01-06 DIAGNOSIS — E66.9 DIABETES MELLITUS TYPE 2 IN OBESE (HCC): Primary | ICD-10-CM

## 2021-01-06 DIAGNOSIS — I15.2 HYPERTENSION ASSOCIATED WITH DIABETES (HCC): ICD-10-CM

## 2021-01-06 DIAGNOSIS — E11.65 HYPERGLYCEMIA DUE TO DIABETES MELLITUS (HCC): ICD-10-CM

## 2021-01-06 DIAGNOSIS — I48.0 PAF (PAROXYSMAL ATRIAL FIBRILLATION) (HCC): ICD-10-CM

## 2021-01-06 DIAGNOSIS — E11.59 HYPERTENSION ASSOCIATED WITH DIABETES (HCC): ICD-10-CM

## 2021-01-06 DIAGNOSIS — E11.69 DIABETES MELLITUS TYPE 2 IN OBESE (HCC): Primary | ICD-10-CM

## 2021-01-06 PROCEDURE — 99214 OFFICE O/P EST MOD 30 MIN: CPT | Performed by: INTERNAL MEDICINE

## 2021-01-06 NOTE — PATIENT INSTRUCTIONS
Please measure your blood sugar fasting (in the morning) and 2 hours after lunch. Please record these values and email them to me in 1 week. Please call us if your blood sugar is <75 or >300.     Please focus on eating healthy plant based nutrition ramyaic

## 2021-01-06 NOTE — TELEPHONE ENCOUNTER
From: Soledad Lilly  To: Med Pearson MD  Sent: 1/6/2021 10:52 AM CST  Subject: Other    DR [facility]  I am having what could be a serious issue with my blood sugar. 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  options are call 911 or

## 2021-01-06 NOTE — PROGRESS NOTES
Virtual Telephone Check-In    This visit is conducted using Telemedicine with live, interactive video and audio. Patient understands and accepts financial responsibility for any deductible, co-insurance and/or co-pays associated with this service.     Tel normally checks his BS only once per week. However, he was not feeling well for 1 day. However, he calls himself a \"hypochondriac. \" so he started checking his BS more frequently. Checked them 4 times yesterday and 5 times today.  He just felt \"uncomforrt • Stroke Neg       Past Medical History:   Diagnosis Date   • Arrhythmia     HX AFIB DX 3 YRS AGO   • Arthritis    • Atrial fibrillation (HCC)    • BPH (benign prostatic hyperplasia)    • Cataract    • Esophageal reflux    • HIGH BLOOD PRESSURE    • HIGH Performed by Helane Closs, MD at [address]   • SI JOINT INJECTION Left 6/9/2015    Performed by Helane Closs, MD at [address]   • SI JOINT INJECTION Right 5/19/2015    Performed by Helane Closs, MD at  chiropracter and PT 12 weeks. Meloxicam, celebrex did not help. # Hypothyroidism: normal TSH in 8/2020. Cont levothyroxine.    # Chronic Constipation: well controlled with docusate prn. We discussed risks vs benefits and indications of colonoscopy. He has smoker/smoking): has had abdominal u/s which he states was negative  Prostate Cancer Screening: no indication at his age. Vaccines: shingles 2009. Tad Chimera 13 in 2/2013. Pneumovac 2016. shingrix 2019. Advised on TDAP. Gets flu shot yearly.   Primary is wi

## 2021-01-06 NOTE — TELEPHONE ENCOUNTER
Fasting blood sugar 206 this morning. Took Metformin and Glipizide with 2 cups of coffee with sugar free creamer and breakfast approximately 8 a.m. Blood sugar at 10:30 am was 243.     Future Appointments   Date Time Provider Pam Mandujano   1/7/2021

## 2021-01-06 NOTE — TELEPHONE ENCOUNTER
Spoke with patient reporting additional readings:    10:15am 293  11:15am 280    Denies frequent urination, increased thirst (noted dry mouth, but has been increasing water intake), HA, fatigue, blurred vision.      Future Appointments   Date Time Provider

## 2021-01-06 NOTE — TELEPHONE ENCOUNTER
Verbally spoke with Dr. Germain Masters, Dr. Germain Masters will do virtual visit now. Patient notified and placed on schedule.

## 2021-01-22 ENCOUNTER — PATIENT MESSAGE (OUTPATIENT)
Dept: INTERNAL MEDICINE CLINIC | Facility: CLINIC | Age: 86
End: 2021-01-22

## 2021-01-22 ENCOUNTER — TELEPHONE (OUTPATIENT)
Dept: INTERNAL MEDICINE CLINIC | Facility: CLINIC | Age: 86
End: 2021-01-22

## 2021-01-22 NOTE — TELEPHONE ENCOUNTER
From: Kenneth Kumar  To: Shamar Lizarraga MD  Sent: 1/22/2021 12:58 PM CST  Subject: Other    Dr Rose White  Results you requested are: Labs tomorrow. Pea more often-BP pulse?    SUGAR AM PM BP AM BP PM  1-15 175 - 91 144/80/87 - 148/84/73   16 166-122 159/76/75 -

## 2021-01-22 NOTE — TELEPHONE ENCOUNTER
Eye exam received from [facility], Dr. Karina Leon.      Report placed in Dr. Katelyn Mistry for review

## 2021-01-22 NOTE — TELEPHONE ENCOUNTER
I have sent patient a [employer] message. Please have him see me on Friday Jan 29th in person. Please call to assist with scheduling.

## 2021-01-23 ENCOUNTER — LAB ENCOUNTER (OUTPATIENT)
Dept: LAB | Age: 86
End: 2021-01-23
Attending: INTERNAL MEDICINE
Payer: MEDICARE

## 2021-01-23 DIAGNOSIS — E11.9 DIABETES MELLITUS WITHOUT COMPLICATION (HCC): ICD-10-CM

## 2021-01-23 LAB
ANION GAP SERPL CALC-SCNC: 3 MMOL/L (ref 0–18)
BUN BLD-MCNC: 24 MG/DL (ref 7–18)
BUN/CREAT SERPL: 23.3 (ref 10–20)
CALCIUM BLD-MCNC: 10.2 MG/DL (ref 8.5–10.1)
CHLORIDE SERPL-SCNC: 102 MMOL/L (ref 98–112)
CO2 SERPL-SCNC: 32 MMOL/L (ref 21–32)
CREAT BLD-MCNC: 1.03 MG/DL
GLUCOSE BLD-MCNC: 195 MG/DL (ref 70–99)
OSMOLALITY SERPL CALC.SUM OF ELEC: 293 MOSM/KG (ref 275–295)
PATIENT FASTING Y/N/NP: NO
POTASSIUM SERPL-SCNC: 3.6 MMOL/L (ref 3.5–5.1)
SODIUM SERPL-SCNC: 137 MMOL/L (ref 136–145)

## 2021-01-23 PROCEDURE — 80048 BASIC METABOLIC PNL TOTAL CA: CPT

## 2021-01-23 PROCEDURE — 36415 COLL VENOUS BLD VENIPUNCTURE: CPT

## 2021-01-25 DIAGNOSIS — E11.9 CONTROLLED TYPE 2 DIABETES MELLITUS WITHOUT COMPLICATION, WITHOUT LONG-TERM CURRENT USE OF INSULIN (HCC): ICD-10-CM

## 2021-01-25 DIAGNOSIS — K59.04 CHRONIC IDIOPATHIC CONSTIPATION: ICD-10-CM

## 2021-01-25 DIAGNOSIS — E03.9 ACQUIRED HYPOTHYROIDISM: ICD-10-CM

## 2021-01-25 DIAGNOSIS — M48.062 SPINAL STENOSIS OF LUMBAR REGION WITH NEUROGENIC CLAUDICATION: ICD-10-CM

## 2021-01-25 DIAGNOSIS — R39.14 BENIGN PROSTATIC HYPERPLASIA WITH INCOMPLETE BLADDER EMPTYING: ICD-10-CM

## 2021-01-25 DIAGNOSIS — N40.1 BENIGN PROSTATIC HYPERPLASIA WITH INCOMPLETE BLADDER EMPTYING: ICD-10-CM

## 2021-01-25 DIAGNOSIS — E11.9 DIABETES MELLITUS WITHOUT COMPLICATION (HCC): ICD-10-CM

## 2021-01-25 DIAGNOSIS — I10 ESSENTIAL HYPERTENSION: ICD-10-CM

## 2021-01-26 RX ORDER — HYDROCHLOROTHIAZIDE 25 MG/1
25 TABLET ORAL DAILY
Qty: 90 TABLET | Refills: 0 | Status: SHIPPED | OUTPATIENT
Start: 2021-01-26 | End: 2021-04-24

## 2021-01-26 RX ORDER — DOCUSATE SODIUM 100 MG/1
100 CAPSULE, LIQUID FILLED ORAL 2 TIMES DAILY PRN
Qty: 180 CAPSULE | Refills: 3 | Status: SHIPPED | OUTPATIENT
Start: 2021-01-26

## 2021-01-26 RX ORDER — LOSARTAN POTASSIUM 100 MG/1
100 TABLET ORAL DAILY
Qty: 90 TABLET | Refills: 0 | Status: SHIPPED | OUTPATIENT
Start: 2021-01-26 | End: 2021-05-07

## 2021-01-26 RX ORDER — GLIPIZIDE 5 MG/1
5 TABLET ORAL
Qty: 90 TABLET | Refills: 3 | Status: SHIPPED | OUTPATIENT
Start: 2021-01-26 | End: 2022-01-21

## 2021-01-26 RX ORDER — FINASTERIDE 5 MG/1
5 TABLET, FILM COATED ORAL
Qty: 90 TABLET | Refills: 0 | Status: SHIPPED | OUTPATIENT
Start: 2021-01-26 | End: 2021-04-26

## 2021-01-26 NOTE — TELEPHONE ENCOUNTER
Future Appointments   Date Time Provider Pam Nazia   1/29/2021 10:30 AM Gladis Hudson MD [facility] [place]

## 2021-01-26 NOTE — TELEPHONE ENCOUNTER
docusate sodium (DOCQLACE) 100 MG Oral Cap          Sig: Take 1 capsule (100 mg total) by mouth 2 (two) times daily as needed for constipation.     Disp:  180 capsule    Refills:  3    Start: 1/25/2021    Class: Normal    Non-formulary    Last ordered: 1 ye finasteride 5 MG Oral Tab         Sig: Take 1 tablet (5 mg total) by mouth once daily. Due for yearly follow up. Please schedule to continue receiving refills.     Disp:  90 tablet    Refills:  0    Start: 1/25/2021    Class: Normal    Non-formulary For: B

## 2021-01-29 ENCOUNTER — OFFICE VISIT (OUTPATIENT)
Dept: INTERNAL MEDICINE CLINIC | Facility: CLINIC | Age: 86
End: 2021-01-29
Payer: MEDICARE

## 2021-01-29 VITALS
HEART RATE: 98 BPM | DIASTOLIC BLOOD PRESSURE: 60 MMHG | HEIGHT: 69 IN | RESPIRATION RATE: 18 BRPM | SYSTOLIC BLOOD PRESSURE: 110 MMHG | WEIGHT: 229.81 LBS | TEMPERATURE: 98 F | OXYGEN SATURATION: 98 % | BODY MASS INDEX: 34.04 KG/M2

## 2021-01-29 DIAGNOSIS — E66.9 DIABETES MELLITUS TYPE 2 IN OBESE (HCC): Primary | ICD-10-CM

## 2021-01-29 DIAGNOSIS — E78.5 HYPERLIPIDEMIA DUE TO TYPE 2 DIABETES MELLITUS (HCC): ICD-10-CM

## 2021-01-29 DIAGNOSIS — E11.69 DIABETES MELLITUS TYPE 2 IN OBESE (HCC): Primary | ICD-10-CM

## 2021-01-29 DIAGNOSIS — E11.69 HYPERLIPIDEMIA DUE TO TYPE 2 DIABETES MELLITUS (HCC): ICD-10-CM

## 2021-01-29 DIAGNOSIS — E11.59 HYPERTENSION ASSOCIATED WITH DIABETES (HCC): ICD-10-CM

## 2021-01-29 DIAGNOSIS — I15.2 HYPERTENSION ASSOCIATED WITH DIABETES (HCC): ICD-10-CM

## 2021-01-29 PROCEDURE — 99213 OFFICE O/P EST LOW 20 MIN: CPT | Performed by: INTERNAL MEDICINE

## 2021-01-29 NOTE — PROGRESS NOTES
Sara Bingham is a 80year old male. HPI:   Patient presents for uncontrolled blood sugars and HTN. 1. DM: FBS are 160-180s. Tolerating metformin 1000 mg BID. 2. HTN: we added aldactone on 1/13. Home BP are SBP < 140s.    3. HLP: tolerating medic History:   Procedure Laterality Date   • BACK SURGERY  12819    l3-l4   • BACK SURGERY  8/28/14    L3-L5 PPS TLIF    • CATARACT     • CHOLECYSTECTOMY      \"prior to 1994\"   • COLONOSCOPY  2009   • COLONOSCOPY N/A 3/21/2014    Performed by Renny Pearce, Never Used    Alcohol use: Yes      Alcohol/week: 3.0 - 4.0 standard drinks      Types: 3 - 4 Glasses of wine per week      Frequency: 2-3 times a week      Drinks per session: 3 or 4      Binge frequency: Weekly      Comment: wine    Drug use:  No chiropracter and PT 12 weeks. Meloxicam, celebrex did not help. # Hypothyroidism: normal TSH in 8/2020. Cont levothyroxine.    # Chronic Constipation: well controlled with docusate prn. We discussed risks vs benefits and indications of colonoscopy. He has Vaccines: shingles 2009. Eduardo Matthews 13 in 2/2013. Pneumovac 2016. shingrix 2019. Advised on TDAP. Gets flu shot yearly. Primary is wife. Second is Daughter, Brenda Farr is medical POA.  Next would be son, Shant Ma. [place] [employer] Code, but no prolonged life support.

## 2021-01-29 NOTE — PATIENT INSTRUCTIONS
You are due for your labs in April, 2021. You are due for your diabetic, dilated eye exam in February, 20201.

## 2021-02-03 DIAGNOSIS — Z23 NEED FOR VACCINATION: ICD-10-CM

## 2021-02-08 ENCOUNTER — PATIENT MESSAGE (OUTPATIENT)
Dept: INTERNAL MEDICINE CLINIC | Facility: CLINIC | Age: 86
End: 2021-02-08

## 2021-02-09 NOTE — TELEPHONE ENCOUNTER
From: Sara Bingham  To: Natalie Tai MD  Sent: 2/8/2021 3:34 PM CST  Subject: Other    DR Horacio Goncalves   Please have My Chart information updated to include. Peg and I had the first Covid shot ([facility] )on January 29.  Scheduled fo

## 2021-02-11 DIAGNOSIS — E11.69 HYPERLIPIDEMIA DUE TO TYPE 2 DIABETES MELLITUS (HCC): ICD-10-CM

## 2021-02-11 DIAGNOSIS — E11.59 HYPERTENSION ASSOCIATED WITH DIABETES (HCC): ICD-10-CM

## 2021-02-11 DIAGNOSIS — E78.2 MIXED HYPERLIPIDEMIA: ICD-10-CM

## 2021-02-11 DIAGNOSIS — E11.9 DIABETES MELLITUS WITHOUT COMPLICATION (HCC): ICD-10-CM

## 2021-02-11 DIAGNOSIS — E11.42 CONTROLLED TYPE 2 DIABETES MELLITUS WITH DIABETIC POLYNEUROPATHY, WITHOUT LONG-TERM CURRENT USE OF INSULIN (HCC): ICD-10-CM

## 2021-02-11 DIAGNOSIS — I15.2 HYPERTENSION ASSOCIATED WITH DIABETES (HCC): ICD-10-CM

## 2021-02-11 DIAGNOSIS — E78.5 HYPERLIPIDEMIA DUE TO TYPE 2 DIABETES MELLITUS (HCC): ICD-10-CM

## 2021-02-11 DIAGNOSIS — I48.91 ATRIAL FIBRILLATION, UNSPECIFIED TYPE (HCC): ICD-10-CM

## 2021-02-12 RX ORDER — PRAVASTATIN SODIUM 40 MG
40 TABLET ORAL NIGHTLY
Qty: 90 TABLET | Refills: 3 | Status: SHIPPED | OUTPATIENT
Start: 2021-02-12

## 2021-02-12 NOTE — TELEPHONE ENCOUNTER
Medication(s) to Refill:   Requested Prescriptions     Pending Prescriptions Disp Refills   • Pravastatin Sodium 40 MG Oral Tab 90 tablet 3     Sig: Take 1 tablet (40 mg total) by mouth nightly.    • metFORMIN HCl 1000 MG Oral Tab 180 tablet 2     Sig: TAKE

## 2021-03-16 RX ORDER — BLOOD SUGAR DIAGNOSTIC
STRIP MISCELLANEOUS
Qty: 100 STRIP | Refills: 0 | Status: SHIPPED | OUTPATIENT
Start: 2021-03-16 | End: 2021-12-08

## 2021-03-16 NOTE — TELEPHONE ENCOUNTER
Name from pharmacy: [facility]          Will file in chart as: PRECISION XTRA BLOOD GLUCOSE In Vitro Strip    Sig: USE TO TEST BLOOD SUGAR ONCE A DAY    Disp:  100 strip    Refills:  0 (Pharmacy requested: Not specified)    Start:

## 2021-03-17 ENCOUNTER — TELEPHONE (OUTPATIENT)
Dept: INTERNAL MEDICINE CLINIC | Facility: CLINIC | Age: 86
End: 2021-03-17

## 2021-03-17 NOTE — TELEPHONE ENCOUNTER
Pt received reminder message for diabetic eye exam.  He has regular exams with Dr. Vicky Fountain d/t macular degeneration.   He expressed frustration that he continually receives these reminder messages for this exam.

## 2021-03-19 NOTE — TELEPHONE ENCOUNTER
Please update DM eye exam flowsheet. DM eye exam was on 2/5/2021 with Dr. Sarah Vanegas. No diabetic retinopathy in either eye.  TY

## 2021-03-26 DIAGNOSIS — E11.9 DIABETES MELLITUS WITHOUT COMPLICATION (HCC): ICD-10-CM

## 2021-03-29 RX ORDER — OMEPRAZOLE 20 MG/1
20 CAPSULE, DELAYED RELEASE ORAL
Qty: 90 CAPSULE | Refills: 3 | Status: SHIPPED | OUTPATIENT
Start: 2021-03-29 | End: 2022-04-04

## 2021-03-29 RX ORDER — SPIRONOLACTONE 25 MG/1
12.5 TABLET ORAL DAILY
Qty: 30 TABLET | Refills: 0 | Status: SHIPPED | OUTPATIENT
Start: 2021-03-29 | End: 2021-07-11

## 2021-04-22 ENCOUNTER — LAB ENCOUNTER (OUTPATIENT)
Dept: LAB | Age: 86
End: 2021-04-22
Attending: INTERNAL MEDICINE
Payer: MEDICARE

## 2021-04-22 DIAGNOSIS — E11.69 DIABETES MELLITUS TYPE 2 IN OBESE (HCC): ICD-10-CM

## 2021-04-22 DIAGNOSIS — E11.59 HYPERTENSION ASSOCIATED WITH DIABETES (HCC): ICD-10-CM

## 2021-04-22 DIAGNOSIS — E66.9 DIABETES MELLITUS TYPE 2 IN OBESE (HCC): ICD-10-CM

## 2021-04-22 DIAGNOSIS — I15.2 HYPERTENSION ASSOCIATED WITH DIABETES (HCC): ICD-10-CM

## 2021-04-22 PROCEDURE — 83036 HEMOGLOBIN GLYCOSYLATED A1C: CPT

## 2021-04-22 PROCEDURE — 84460 ALANINE AMINO (ALT) (SGPT): CPT

## 2021-04-22 PROCEDURE — 84450 TRANSFERASE (AST) (SGOT): CPT

## 2021-04-22 PROCEDURE — 82570 ASSAY OF URINE CREATININE: CPT

## 2021-04-22 PROCEDURE — 80048 BASIC METABOLIC PNL TOTAL CA: CPT

## 2021-04-22 PROCEDURE — 85025 COMPLETE CBC W/AUTO DIFF WBC: CPT

## 2021-04-22 PROCEDURE — 82043 UR ALBUMIN QUANTITATIVE: CPT

## 2021-04-22 PROCEDURE — 36415 COLL VENOUS BLD VENIPUNCTURE: CPT

## 2021-04-24 RX ORDER — HYDROCHLOROTHIAZIDE 25 MG/1
25 TABLET ORAL DAILY
Qty: 90 TABLET | Refills: 2 | Status: SHIPPED | OUTPATIENT
Start: 2021-04-24 | End: 2021-10-10

## 2021-04-25 DIAGNOSIS — N40.1 BENIGN PROSTATIC HYPERPLASIA WITH INCOMPLETE BLADDER EMPTYING: ICD-10-CM

## 2021-04-25 DIAGNOSIS — R39.14 BENIGN PROSTATIC HYPERPLASIA WITH INCOMPLETE BLADDER EMPTYING: ICD-10-CM

## 2021-04-26 RX ORDER — FINASTERIDE 5 MG/1
TABLET, FILM COATED ORAL
Qty: 90 TABLET | Refills: 0 | Status: SHIPPED | OUTPATIENT
Start: 2021-04-26 | End: 2021-07-23

## 2021-04-26 NOTE — TELEPHONE ENCOUNTER
Medication(s) to Refill:   Requested Prescriptions     Pending Prescriptions Disp Refills   • FINASTERIDE 5 MG Oral Tab [Pharmacy Med Name: Finasteride 5 Mg Tab Auro] 90 tablet 0     Sig: TAKE ONE TABLET BY MOUTH ONE TIME DAILY       LOV: 1-  RTC:

## 2021-05-07 RX ORDER — LOSARTAN POTASSIUM 100 MG/1
TABLET ORAL
Qty: 90 TABLET | Refills: 0 | Status: SHIPPED | OUTPATIENT
Start: 2021-05-07 | End: 2021-08-02

## 2021-05-07 NOTE — TELEPHONE ENCOUNTER
Medication(s) to Refill:   Requested Prescriptions     Pending Prescriptions Disp Refills   • LOSARTAN 100 MG Oral Tab [Pharmacy Med Name: LOSARTAN 100MG TABLETS] 90 tablet 0     Sig: TAKE 1 TABLET(100 MG) BY MOUTH DAILY       LOV: 1-  RTC:  7/2021

## 2021-06-09 NOTE — PROGRESS NOTES
SPO2 % ON ROOM AIR 92%    SPO2% AMBULATION ON ROOM AIR 93% You can access the FollowMyHealth Patient Portal offered by [facility] by registering at the following website: [email or web address]. By joining [employer]’s FollowMyHealth portal, you will also be able to view your health information using other applications (apps) compatible with our system.

## 2021-07-09 DIAGNOSIS — E11.9 DIABETES MELLITUS WITHOUT COMPLICATION (HCC): ICD-10-CM

## 2021-07-09 NOTE — TELEPHONE ENCOUNTER
LOV: 1/29/2021 with Dr. Reynaldo Crain  RTC: July 2021  Last Relevant Labs: 4/22/2021   Filled: 3/29/2021    #30 with 0 refills    No future appointments.

## 2021-07-11 RX ORDER — SPIRONOLACTONE 25 MG/1
TABLET ORAL
Qty: 45 TABLET | Refills: 0 | Status: SHIPPED | OUTPATIENT
Start: 2021-07-11 | End: 2021-10-05

## 2021-07-20 ENCOUNTER — HOSPITAL ENCOUNTER (OUTPATIENT)
Age: 86
Discharge: HOME | End: 2021-07-20
Attending: EMERGENCY MEDICINE
Payer: MEDICARE

## 2021-07-20 ENCOUNTER — APPOINTMENT (OUTPATIENT)
Dept: GENERAL RADIOLOGY | Age: 86
End: 2021-07-20
Attending: EMERGENCY MEDICINE
Payer: MEDICARE

## 2021-07-20 VITALS
DIASTOLIC BLOOD PRESSURE: 88 MMHG | HEIGHT: 69 IN | OXYGEN SATURATION: 98 % | HEART RATE: 87 BPM | TEMPERATURE: 98 F | WEIGHT: 221 LBS | SYSTOLIC BLOOD PRESSURE: 157 MMHG | RESPIRATION RATE: 16 BRPM | BODY MASS INDEX: 32.73 KG/M2

## 2021-07-20 DIAGNOSIS — S83.91XA SPRAIN OF RIGHT KNEE, UNSPECIFIED LIGAMENT, INITIAL ENCOUNTER: Primary | ICD-10-CM

## 2021-07-20 PROCEDURE — 73562 X-RAY EXAM OF KNEE 3: CPT | Performed by: EMERGENCY MEDICINE

## 2021-07-20 PROCEDURE — 99213 OFFICE O/P EST LOW 20 MIN: CPT

## 2021-07-20 RX ORDER — ACETAMINOPHEN 500 MG
1000 TABLET ORAL ONCE
Status: COMPLETED | OUTPATIENT
Start: 2021-07-20 | End: 2021-07-20

## 2021-07-20 NOTE — ED INITIAL ASSESSMENT (HPI)
Pt aox4. Pt accompanied by wife from home. [place] landing on rt knee onto grass this am. Pt states \"I was able to walk after falling. \" Pt states unable to bear weight, pt c/o pain with weight bearing to rt knee.  Swe

## 2021-07-21 ENCOUNTER — TELEPHONE (OUTPATIENT)
Dept: INTERNAL MEDICINE CLINIC | Facility: CLINIC | Age: 86
End: 2021-07-21

## 2021-07-21 NOTE — ED QUICK NOTES
Nuvia pct placed ace wrap and knee immobilizer to patients rt knee. Pt states has a walker at home. Rn and Comfort Pct wheeled patient to care per wheelchair and assisted patient into front seat of car. Patients wife driving patient home.

## 2021-07-21 NOTE — TELEPHONE ENCOUNTER
Patient was seen at the urgent care yesterday because he fell and injured his knee. The urgent care did an X ray and his knee is not broken.  The urgent care physician referred him to an orthopedic and he has been trying to call all day and he cant get thro

## 2021-07-21 NOTE — ED PROVIDER NOTES
Patient Seen in: [facility] [place]      History   Patient presents with:  Fall  Leg or Foot Injury    Stated Complaint: right knee injury due to fall    HPI/Subjective:   HPI    Patient is a 51-year-old male comes to immediate care for evaluati Procedure: PIRIFORMIS/SCIATIC NERVE INJECTION;  Surgeon: Winnie Sibley MD;  Location: [address] [place] [address] MANAGEMENT   • EYE SURGERY     • VICKY GINEHA & Ned Woo NDL/CATH SPI DX/THER Yanique Crandall N/A 4/27/2015    Procedure: LUMBAR EPIDURAL;  Surgeon: Florian Emery Location: [facility]  PAIN MANAGEMENT   • [facility]  10/2003    L3-L4   • OTHER SURGICAL HISTORY  4/2013    right lumbar facet steroid injection    • OTHER SURGICAL HISTORY  8/28/2014    L3-5 post LIF   • PATIENT DOCUMENTED NOT TO HAVE Azul Blight INJECTION;  Surgeon: Madhav Holt MD;  Location: [facility] FOR PAIN MANAGEMENT   • PATIENT [address] FOR IV ANTIBIOTIC SURGICAL SITE INFECTION PROPHYLAXIS.  Right 7/7/2015    Procedure: SI JOINT INJECTION;  Surgeon: Madhav Holt, distress  HEAD: Normocephalic/atraumatic  NECK: Supple and nontender. No midline cervical spine tenderness  CHEST: Nontender without ecchymosis or abrasion.   ABDOMINAL: Nontender, soft  Musculoskeletal: Patient has significant amount of swelling and ecchy knee so I do not think his examination is consistent with any quadriceps tendon injury. No obvious fracture. Placed in a knee immobilizer. Consider ligamentous injury given degree of hemarthrosis.   Recommend orthopedic follow-up and he likely will need

## 2021-07-21 NOTE — TELEPHONE ENCOUNTER
I returned pts call to see if I was able to assist. Pt states that he reached out to Dr Kelly Barron office but they are not scheduling appointments over the phone and he was unable to schedule via intelloCut.      Pt did note that at this time he does not need

## 2021-07-23 ENCOUNTER — HOSPITAL ENCOUNTER (EMERGENCY)
Age: 86
Discharge: HOME | End: 2021-07-23
Attending: EMERGENCY MEDICINE
Payer: MEDICARE

## 2021-07-23 ENCOUNTER — APPOINTMENT (OUTPATIENT)
Dept: ULTRASOUND IMAGING | Age: 86
End: 2021-07-23
Attending: EMERGENCY MEDICINE
Payer: MEDICARE

## 2021-07-23 VITALS
WEIGHT: 221 LBS | OXYGEN SATURATION: 96 % | SYSTOLIC BLOOD PRESSURE: 145 MMHG | DIASTOLIC BLOOD PRESSURE: 67 MMHG | RESPIRATION RATE: 16 BRPM | BODY MASS INDEX: 32.73 KG/M2 | TEMPERATURE: 98 F | HEIGHT: 69 IN | HEART RATE: 66 BPM

## 2021-07-23 DIAGNOSIS — N40.1 BENIGN PROSTATIC HYPERPLASIA WITH INCOMPLETE BLADDER EMPTYING: ICD-10-CM

## 2021-07-23 DIAGNOSIS — S80.00XA TRAUMATIC HEMATOMA OF KNEE, INITIAL ENCOUNTER: Primary | ICD-10-CM

## 2021-07-23 DIAGNOSIS — R39.14 BENIGN PROSTATIC HYPERPLASIA WITH INCOMPLETE BLADDER EMPTYING: ICD-10-CM

## 2021-07-23 LAB
INR BLD: 2.27 (ref 0.88–1.11)
PSA SERPL DL<=0.01 NG/ML-MCNC: 24.9 SECONDS (ref 12–14.3)

## 2021-07-23 PROCEDURE — 99284 EMERGENCY DEPT VISIT MOD MDM: CPT

## 2021-07-23 PROCEDURE — 36415 COLL VENOUS BLD VENIPUNCTURE: CPT

## 2021-07-23 PROCEDURE — 85610 PROTHROMBIN TIME: CPT | Performed by: EMERGENCY MEDICINE

## 2021-07-23 PROCEDURE — 93971 EXTREMITY STUDY: CPT | Performed by: EMERGENCY MEDICINE

## 2021-07-23 RX ORDER — FINASTERIDE 5 MG/1
5 TABLET, FILM COATED ORAL DAILY
Qty: 90 TABLET | Refills: 0 | Status: SHIPPED | OUTPATIENT
Start: 2021-07-23 | End: 2021-10-10

## 2021-07-23 NOTE — TELEPHONE ENCOUNTER
Medication(s) to Refill:   Requested Prescriptions     Pending Prescriptions Disp Refills   • finasteride 5 MG Oral Tab 90 tablet 0     Sig: Take 1 tablet (5 mg total) by mouth daily.        LOV: 1-    RTC:  7/2021    Last Refill: 4---- 90 tab

## 2021-07-23 NOTE — ED PROVIDER NOTES
Patient Seen in: [facility] [place]      History   Patient presents with:  Swelling Edema  Deep Vein Thrombosis    Stated Complaint: rt knee swelling    HPI/Subjective:   HPI    80-year-old male who presents to the emergency depart Ze Lee MD;  Location: [address] [place] [address] ENDOSCOPY   • DIL URETHRA STRIC,MALE,GEN [address]  8/28/14    EDW, JESUSITA   • DRAIN/INJECT LARGE JOINT/BURSA N/A 4/27/2015    Procedure: LUMBAR EPIDURAL;  Surgeon: Jon Mccoy MD;  Location: [facility] FOR PAIN MANAGEMENT   •  Procedure: LUMBAR EPIDURAL;  Surgeon: Amina Fenton MD;  Location: [address] [facility] [address] PAIN MANAGEMENT   • INJECTION; SINGLE/MULTIPLE TRIGGER POINT(S), 1/2 MUSCLE Right 4/7/2015    Procedure: PIRIFORMIS/SCIATIC NERVE INJECTION;  Surgeon: Amina Fenton MD; PROPHYLAXIS.  N/A 4/27/2015    Procedure: LUMBAR EPIDURAL;  Surgeon: Isabelle Fisher MD;  Location: [facility] FOR PAIN MANAGEMENT   • PATIENT [place] PREOPERATIVE ORDER FOR IV ANTIBIOTIC SURGICAL SITE INFECTION PROPHYLAXIS.  5/19/2015    Procedure: ;  Rylan Pranav systems reviewed and negative except as noted above.     Physical Exam     ED Triage Vitals [07/23/21 1611]   /67   Pulse 104   Resp 16   Temp 98 °F (36.7 °C)   Temp src Temporal   SpO2 96 %   O2 Device None (Room air)       Current:/67   Pulse the contralateral common femoral vein.       PATIENT STATED HISTORY: (As transcribed by Technologist)  Patient states fell on tues rt knee pain and swelling.  Patient states today knee increase in swelling and brusing over rt knee with blister.  Warm to aide ultrasound be performed to assess for possible DVT though it is low possibility considering the patient is on anticoagulant and we will assess his INR to see if the patient is therapeutic, subtherapeutic, or supratherapeutic with his anticoagulation.   This

## 2021-07-23 NOTE — TELEPHONE ENCOUNTER
[address] calling on patients behalf requesting refill.      FINASTERIDE 5 MG Oral Tab    [facility] #[id number] - [address] AT [facility] OF [address] [place] [address], [phone], [phone]

## 2021-07-23 NOTE — ED INITIAL ASSESSMENT (HPI)
Melvin [place] on weds and hit r knee- went to immediate care on weds, went to ortho on thurs-- concerned today that swelling increased with bruising up inner thigh with blisters forming on r knee- called Cardiology today and advised to come to ER

## 2021-07-26 ENCOUNTER — TELEPHONE (OUTPATIENT)
Dept: INTERNAL MEDICINE CLINIC | Facility: CLINIC | Age: 86
End: 2021-07-26

## 2021-07-26 NOTE — TELEPHONE ENCOUNTER
Approved referral for [facility] faxed to [phone] and confirmation received.

## 2021-07-26 NOTE — TELEPHONE ENCOUNTER
I spoke with patient regarding need for home PT. He actually is in very minimal pain and is functional and ambulating. He wanted PT to help assess what activities it is safe for him to do as he has to keep his right leg elevated and compressed.  He has appt

## 2021-07-28 PROBLEM — S80.01XA TRAUMATIC HEMATOMA OF RIGHT KNEE: Status: ACTIVE | Noted: 2021-07-28

## 2021-08-02 ENCOUNTER — LAB ENCOUNTER (OUTPATIENT)
Dept: LAB | Age: 86
End: 2021-08-02
Attending: INTERNAL MEDICINE
Payer: MEDICARE

## 2021-08-02 DIAGNOSIS — S80.01XA HEMATOMA OF RIGHT KNEE REGION: ICD-10-CM

## 2021-08-02 DIAGNOSIS — S70.11XA HEMATOMA OF RIGHT THIGH, INITIAL ENCOUNTER: ICD-10-CM

## 2021-08-02 LAB
BASOPHILS # BLD AUTO: 0.05 X10(3) UL (ref 0–0.2)
BASOPHILS NFR BLD AUTO: 0.6 %
EOSINOPHIL # BLD AUTO: 0.09 X10(3) UL (ref 0–0.7)
EOSINOPHIL NFR BLD AUTO: 1.1 %
ERYTHROCYTE [DISTWIDTH] IN BLOOD BY AUTOMATED COUNT: 14.3 %
HCT VFR BLD AUTO: 31.5 %
HGB BLD-MCNC: 9.7 G/DL
IMM GRANULOCYTES # BLD AUTO: 0.07 X10(3) UL (ref 0–1)
IMM GRANULOCYTES NFR BLD: 0.8 %
LYMPHOCYTES # BLD AUTO: 0.97 X10(3) UL (ref 1–4)
LYMPHOCYTES NFR BLD AUTO: 11.6 %
MCH RBC QN AUTO: 29.8 PG (ref 26–34)
MCHC RBC AUTO-ENTMCNC: 30.8 G/DL (ref 31–37)
MCV RBC AUTO: 96.9 FL
MONOCYTES # BLD AUTO: 0.7 X10(3) UL (ref 0.1–1)
MONOCYTES NFR BLD AUTO: 8.4 %
NEUTROPHILS # BLD AUTO: 6.47 X10 (3) UL (ref 1.5–7.7)
NEUTROPHILS # BLD AUTO: 6.47 X10(3) UL (ref 1.5–7.7)
NEUTROPHILS NFR BLD AUTO: 77.5 %
PLATELET # BLD AUTO: 294 10(3)UL (ref 150–450)
RBC # BLD AUTO: 3.25 X10(6)UL
WBC # BLD AUTO: 8.4 X10(3) UL (ref 4–11)

## 2021-08-02 PROCEDURE — 36415 COLL VENOUS BLD VENIPUNCTURE: CPT

## 2021-08-02 PROCEDURE — 85025 COMPLETE CBC W/AUTO DIFF WBC: CPT

## 2021-08-02 RX ORDER — LOSARTAN POTASSIUM 100 MG/1
100 TABLET ORAL DAILY
Qty: 90 TABLET | Refills: 2 | Status: SHIPPED | OUTPATIENT
Start: 2021-08-02 | End: 2022-04-26

## 2021-08-03 PROBLEM — D64.9 NORMOCYTIC ANEMIA: Status: ACTIVE | Noted: 2021-08-03

## 2021-08-05 ENCOUNTER — LAB ENCOUNTER (OUTPATIENT)
Dept: LAB | Age: 86
End: 2021-08-05
Attending: INTERNAL MEDICINE
Payer: MEDICARE

## 2021-08-05 DIAGNOSIS — Z79.01 MONITORING FOR LONG-TERM ANTICOAGULANT USE: ICD-10-CM

## 2021-08-05 DIAGNOSIS — D64.9 NORMOCYTIC ANEMIA: Primary | ICD-10-CM

## 2021-08-05 DIAGNOSIS — Z51.81 MONITORING FOR LONG-TERM ANTICOAGULANT USE: ICD-10-CM

## 2021-08-05 DIAGNOSIS — D64.9 NORMOCYTIC ANEMIA: ICD-10-CM

## 2021-08-05 LAB
APTT PPP: 63 SECONDS (ref 25.4–36.1)
DEPRECATED HBV CORE AB SER IA-ACNC: 158.8 NG/ML
FOLATE SERPL-MCNC: 44.4 NG/ML (ref 8.7–?)
INR BLD: 2.25 (ref 0.89–1.11)
PSA SERPL DL<=0.01 NG/ML-MCNC: 25.4 SECONDS (ref 12.2–14.5)
VIT B12 SERPL-MCNC: 470 PG/ML (ref 193–986)

## 2021-08-05 PROCEDURE — 82607 VITAMIN B-12: CPT

## 2021-08-05 PROCEDURE — 85610 PROTHROMBIN TIME: CPT

## 2021-08-05 PROCEDURE — 85730 THROMBOPLASTIN TIME PARTIAL: CPT

## 2021-08-05 PROCEDURE — 82746 ASSAY OF FOLIC ACID SERUM: CPT

## 2021-08-05 PROCEDURE — 82728 ASSAY OF FERRITIN: CPT

## 2021-08-05 PROCEDURE — 36415 COLL VENOUS BLD VENIPUNCTURE: CPT

## 2021-08-06 ENCOUNTER — TELEPHONE (OUTPATIENT)
Dept: INTERNAL MEDICINE CLINIC | Facility: CLINIC | Age: 86
End: 2021-08-06

## 2021-08-06 NOTE — TELEPHONE ENCOUNTER
I discussed patients lab results with him. I explained his anemia. His wound has healed dramatically. He is swollen from knees down to his toes and it is only marginally better. Cardiology did not want him to stop his coumadin for risk of stroke.  He does n

## 2021-08-12 ENCOUNTER — HOSPITAL ENCOUNTER (OUTPATIENT)
Facility: HOSPITAL | Age: 86
Setting detail: OBSERVATION
Discharge: HOME | End: 2021-08-13
Attending: EMERGENCY MEDICINE | Admitting: HOSPITALIST
Payer: MEDICARE

## 2021-08-12 ENCOUNTER — APPOINTMENT (OUTPATIENT)
Dept: GENERAL RADIOLOGY | Age: 86
End: 2021-08-12
Attending: EMERGENCY MEDICINE
Payer: MEDICARE

## 2021-08-12 DIAGNOSIS — I20.8 ANGINAL EQUIVALENT (HCC): ICD-10-CM

## 2021-08-12 DIAGNOSIS — R00.2 PALPITATIONS: Primary | ICD-10-CM

## 2021-08-12 PROBLEM — I20.89 ANGINAL EQUIVALENT: Status: ACTIVE | Noted: 2021-08-12

## 2021-08-12 PROBLEM — I20.89 ANGINAL EQUIVALENT (HCC): Status: ACTIVE | Noted: 2021-08-12

## 2021-08-12 LAB
ALBUMIN SERPL-MCNC: 3.8 G/DL (ref 3.4–5)
ALBUMIN/GLOB SERPL: 1.2 {RATIO} (ref 1–2)
ALP LIVER SERPL-CCNC: 86 U/L
ALT SERPL-CCNC: 36 U/L
ANION GAP SERPL CALC-SCNC: 6 MMOL/L (ref 0–18)
AST SERPL-CCNC: 26 U/L (ref 15–37)
BASOPHILS # BLD AUTO: 0.05 X10(3) UL (ref 0–0.2)
BASOPHILS NFR BLD AUTO: 0.7 %
BILIRUB SERPL-MCNC: 0.5 MG/DL (ref 0.1–2)
BUN BLD-MCNC: 21 MG/DL (ref 7–18)
CALCIUM BLD-MCNC: 9.8 MG/DL (ref 8.5–10.1)
CHLORIDE SERPL-SCNC: 102 MMOL/L (ref 98–112)
CO2 SERPL-SCNC: 28 MMOL/L (ref 21–32)
CREAT BLD-MCNC: 1.07 MG/DL
EOSINOPHIL # BLD AUTO: 0.13 X10(3) UL (ref 0–0.7)
EOSINOPHIL NFR BLD AUTO: 1.9 %
ERYTHROCYTE [DISTWIDTH] IN BLOOD BY AUTOMATED COUNT: 14.5 %
GLOBULIN PLAS-MCNC: 3.3 G/DL (ref 2.8–4.4)
GLUCOSE BLD-MCNC: 151 MG/DL (ref 70–99)
HCT VFR BLD AUTO: 32.3 %
HGB BLD-MCNC: 10.6 G/DL
IMM GRANULOCYTES # BLD AUTO: 0.05 X10(3) UL (ref 0–1)
IMM GRANULOCYTES NFR BLD: 0.7 %
INR BLD: 2.56 (ref 0.88–1.11)
LYMPHOCYTES # BLD AUTO: 1.05 X10(3) UL (ref 1–4)
LYMPHOCYTES NFR BLD AUTO: 15.1 %
M PROTEIN MFR SERPL ELPH: 7.1 G/DL (ref 6.4–8.2)
MCH RBC QN AUTO: 30.3 PG (ref 26–34)
MCHC RBC AUTO-ENTMCNC: 32.8 G/DL (ref 31–37)
MCV RBC AUTO: 92.3 FL
MONOCYTES # BLD AUTO: 0.54 X10(3) UL (ref 0.1–1)
MONOCYTES NFR BLD AUTO: 7.7 %
NEUTROPHILS # BLD AUTO: 5.15 X10 (3) UL (ref 1.5–7.7)
NEUTROPHILS # BLD AUTO: 5.15 X10(3) UL (ref 1.5–7.7)
NEUTROPHILS NFR BLD AUTO: 73.9 %
NT-PROBNP SERPL-MCNC: 336 PG/ML (ref ?–450)
OSMOLALITY SERPL CALC.SUM OF ELEC: 288 MOSM/KG (ref 275–295)
PLATELET # BLD AUTO: 275 10(3)UL (ref 150–450)
POTASSIUM SERPL-SCNC: 4 MMOL/L (ref 3.5–5.1)
PSA SERPL DL<=0.01 NG/ML-MCNC: 27.7 SECONDS (ref 11.9–14.3)
RBC # BLD AUTO: 3.5 X10(6)UL
SARS-COV-2 RNA RESP QL NAA+PROBE: NOT DETECTED
SODIUM SERPL-SCNC: 136 MMOL/L (ref 136–145)
TROPONIN I SERPL-MCNC: <0.045 NG/ML (ref ?–0.04)
TROPONIN I SERPL-MCNC: <0.045 NG/ML (ref ?–0.04)
WBC # BLD AUTO: 7 X10(3) UL (ref 4–11)

## 2021-08-12 PROCEDURE — 99220 INITIAL OBSERVATION CARE,LEVL III: CPT | Performed by: HOSPITALIST

## 2021-08-12 PROCEDURE — 71045 X-RAY EXAM CHEST 1 VIEW: CPT | Performed by: EMERGENCY MEDICINE

## 2021-08-12 RX ORDER — ACETAMINOPHEN 325 MG/1
650 TABLET ORAL EVERY 6 HOURS PRN
Status: DISCONTINUED | OUTPATIENT
Start: 2021-08-12 | End: 2021-08-13

## 2021-08-12 RX ORDER — SODIUM PHOSPHATE, DIBASIC AND SODIUM PHOSPHATE, MONOBASIC 7; 19 G/133ML; G/133ML
1 ENEMA RECTAL ONCE AS NEEDED
Status: DISCONTINUED | OUTPATIENT
Start: 2021-08-12 | End: 2021-08-13

## 2021-08-12 RX ORDER — FINASTERIDE 5 MG/1
5 TABLET, FILM COATED ORAL DAILY
Status: DISCONTINUED | OUTPATIENT
Start: 2021-08-12 | End: 2021-08-13

## 2021-08-12 RX ORDER — SPIRONOLACTONE 25 MG/1
12.5 TABLET ORAL DAILY
Status: DISCONTINUED | OUTPATIENT
Start: 2021-08-12 | End: 2021-08-13

## 2021-08-12 RX ORDER — DOCUSATE SODIUM 100 MG/1
100 CAPSULE, LIQUID FILLED ORAL 2 TIMES DAILY
Status: DISCONTINUED | OUTPATIENT
Start: 2021-08-12 | End: 2021-08-13

## 2021-08-12 RX ORDER — WARFARIN SODIUM 5 MG/1
5 TABLET ORAL
Status: DISCONTINUED | OUTPATIENT
Start: 2021-08-13 | End: 2021-08-13

## 2021-08-12 RX ORDER — ONDANSETRON 2 MG/ML
4 INJECTION INTRAMUSCULAR; INTRAVENOUS EVERY 6 HOURS PRN
Status: DISCONTINUED | OUTPATIENT
Start: 2021-08-12 | End: 2021-08-13

## 2021-08-12 RX ORDER — LEVOTHYROXINE SODIUM 88 UG/1
88 TABLET ORAL
Status: DISCONTINUED | OUTPATIENT
Start: 2021-08-13 | End: 2021-08-13

## 2021-08-12 RX ORDER — BISACODYL 10 MG
10 SUPPOSITORY, RECTAL RECTAL
Status: DISCONTINUED | OUTPATIENT
Start: 2021-08-12 | End: 2021-08-13

## 2021-08-12 RX ORDER — TAMSULOSIN HYDROCHLORIDE 0.4 MG/1
0.4 CAPSULE ORAL DAILY
Status: DISCONTINUED | OUTPATIENT
Start: 2021-08-12 | End: 2021-08-13

## 2021-08-12 RX ORDER — HYDROCHLOROTHIAZIDE 25 MG/1
25 TABLET ORAL DAILY
Status: DISCONTINUED | OUTPATIENT
Start: 2021-08-12 | End: 2021-08-13

## 2021-08-12 RX ORDER — LOSARTAN POTASSIUM 100 MG/1
100 TABLET ORAL DAILY
Status: DISCONTINUED | OUTPATIENT
Start: 2021-08-12 | End: 2021-08-13

## 2021-08-12 RX ORDER — WARFARIN SODIUM 7.5 MG/1
7.5 TABLET ORAL
Status: DISCONTINUED | OUTPATIENT
Start: 2021-08-12 | End: 2021-08-13

## 2021-08-12 RX ORDER — PRAVASTATIN SODIUM 20 MG
40 TABLET ORAL NIGHTLY
Refills: 3 | Status: DISCONTINUED | OUTPATIENT
Start: 2021-08-12 | End: 2021-08-13

## 2021-08-12 RX ORDER — METOCLOPRAMIDE HYDROCHLORIDE 5 MG/ML
10 INJECTION INTRAMUSCULAR; INTRAVENOUS EVERY 8 HOURS PRN
Status: DISCONTINUED | OUTPATIENT
Start: 2021-08-12 | End: 2021-08-13

## 2021-08-12 RX ORDER — PANTOPRAZOLE SODIUM 20 MG/1
20 TABLET, DELAYED RELEASE ORAL
Refills: 3 | Status: DISCONTINUED | OUTPATIENT
Start: 2021-08-13 | End: 2021-08-13

## 2021-08-12 RX ORDER — POLYETHYLENE GLYCOL 3350 17 G/17G
17 POWDER, FOR SOLUTION ORAL DAILY PRN
Status: DISCONTINUED | OUTPATIENT
Start: 2021-08-12 | End: 2021-08-13

## 2021-08-12 NOTE — ED PROVIDER NOTES
Patient Seen in: THE [facility] Emergency Department In [place]      History   Patient presents with:  Arrythmia/Palpitations    Stated Complaint: feeling fluttering in chest that started last noc    HPI/Subjective:   HPI    The patient is an 15-year-old male STRIC,MALE,GEN ANESTH  8/28/14    EDW, JESUSITA   • DRAIN/INJECT LARGE JOINT/BURSA N/A 4/27/2015    Procedure: LUMBAR EPIDURAL;  Surgeon: Jared Viveros MD;  Location: [facility] FOR PAIN MANAGEMENT   • DRAIN/INJECT LARGE JOINT/BURSA Right 7/7/2015    Proced MD;  Location: [address] [place] [address] Dr PAIN MANAGEMENT   • INJECTION; SINGLE/MULTIPLE TRIGGER POINT(S), 1/2 MUSCLE Right 4/7/2015    Procedure: PIRIFORMIS/SCIATIC NERVE INJECTION;  Surgeon: Isabela Chaudhari MD;  Location: [address] [place] [address]   • INJECTION Surgeon: Hernandez Amin MD;  Location: [facility] FOR PAIN MANAGEMENT   • PATIENT [address] FOR IV ANTIBIOTIC SURGICAL SITE INFECTION PROPHYLAXIS.  5/19/2015    Procedure: ;  Surgeon: Hernandez Amin MD;  Location: [address] [place] [address] except as noted above.     Physical Exam     ED Triage Vitals [08/12/21 1619]   /71   Pulse 73   Resp 16   Temp 97.8 °F (36.6 °C)   Temp src Temporal   SpO2 100 %   O2 Device None (Room air)       Current:/81 (BP Location: Right arm)   Pulse 92 for the following components:    RBC 3.50 (*)     HGB 10.6 (*)     HCT 32.3 (*)     All other components within normal limits   TROPONIN I - Normal   TROPONIN I - Normal   PRO BETA NATRIURETIC PEPTIDE - Normal   RAPID SARS-COV-2 BY PCR - Normal   CBC WITH =====    CONCLUSION:  Normal heart size and pulmonary vascularity. No focal infiltrate, consolidation, effusion or pneumothorax.                    Dictated by (CST): Christopher Stubbs MD on 8/12/2021 at 5:30 PM         F

## 2021-08-13 ENCOUNTER — APPOINTMENT (OUTPATIENT)
Dept: CV DIAGNOSTICS | Facility: HOSPITAL | Age: 86
End: 2021-08-13
Attending: PHYSICIAN ASSISTANT
Payer: MEDICARE

## 2021-08-13 ENCOUNTER — APPOINTMENT (OUTPATIENT)
Dept: CV DIAGNOSTICS | Facility: HOSPITAL | Age: 86
End: 2021-08-13
Attending: INTERNAL MEDICINE
Payer: MEDICARE

## 2021-08-13 VITALS
TEMPERATURE: 98 F | RESPIRATION RATE: 18 BRPM | WEIGHT: 209.81 LBS | BODY MASS INDEX: 31.08 KG/M2 | HEIGHT: 69 IN | SYSTOLIC BLOOD PRESSURE: 114 MMHG | OXYGEN SATURATION: 96 % | HEART RATE: 74 BPM | DIASTOLIC BLOOD PRESSURE: 58 MMHG

## 2021-08-13 LAB
ANION GAP SERPL CALC-SCNC: 3 MMOL/L (ref 0–18)
ATRIAL RATE: 75 BPM
BUN BLD-MCNC: 18 MG/DL (ref 7–18)
CALCIUM BLD-MCNC: 9.5 MG/DL (ref 8.5–10.1)
CHLORIDE SERPL-SCNC: 104 MMOL/L (ref 98–112)
CK SERPL-CCNC: 138 U/L
CO2 SERPL-SCNC: 31 MMOL/L (ref 21–32)
CREAT BLD-MCNC: 0.92 MG/DL
ERYTHROCYTE [DISTWIDTH] IN BLOOD BY AUTOMATED COUNT: 14.2 %
GLUCOSE BLD-MCNC: 110 MG/DL (ref 70–99)
HCT VFR BLD AUTO: 34.1 %
HGB BLD-MCNC: 10.7 G/DL
INR BLD: 2.41 (ref 0.89–1.11)
MCH RBC QN AUTO: 29.9 PG (ref 26–34)
MCHC RBC AUTO-ENTMCNC: 31.4 G/DL (ref 31–37)
MCV RBC AUTO: 95.3 FL
OSMOLALITY SERPL CALC.SUM OF ELEC: 289 MOSM/KG (ref 275–295)
P AXIS: -7 DEGREES
P-R INTERVAL: 250 MS
PLATELET # BLD AUTO: 258 10(3)UL (ref 150–450)
POTASSIUM SERPL-SCNC: 3.5 MMOL/L (ref 3.5–5.1)
POTASSIUM SERPL-SCNC: 4.2 MMOL/L (ref 3.5–5.1)
PSA SERPL DL<=0.01 NG/ML-MCNC: 26.8 SECONDS (ref 12.2–14.5)
Q-T INTERVAL: 376 MS
QRS DURATION: 98 MS
QTC CALCULATION (BEZET): 419 MS
R AXIS: -43 DEGREES
RBC # BLD AUTO: 3.58 X10(6)UL
SODIUM SERPL-SCNC: 138 MMOL/L (ref 136–145)
T AXIS: 43 DEGREES
TROPONIN I SERPL-MCNC: <0.045 NG/ML (ref ?–0.04)
VENTRICULAR RATE: 75 BPM
WBC # BLD AUTO: 6.4 X10(3) UL (ref 4–11)

## 2021-08-13 PROCEDURE — 93017 CV STRESS TEST TRACING ONLY: CPT | Performed by: INTERNAL MEDICINE

## 2021-08-13 PROCEDURE — 93306 TTE W/DOPPLER COMPLETE: CPT | Performed by: PHYSICIAN ASSISTANT

## 2021-08-13 PROCEDURE — 78452 HT MUSCLE IMAGE SPECT MULT: CPT | Performed by: INTERNAL MEDICINE

## 2021-08-13 PROCEDURE — 93018 CV STRESS TEST I&R ONLY: CPT | Performed by: INTERNAL MEDICINE

## 2021-08-13 PROCEDURE — 99217 OBSERVATION CARE DISCHARGE: CPT | Performed by: INTERNAL MEDICINE

## 2021-08-13 PROCEDURE — B246ZZZ ULTRASONOGRAPHY OF RIGHT AND LEFT HEART: ICD-10-PCS | Performed by: INTERNAL MEDICINE

## 2021-08-13 RX ORDER — POTASSIUM CHLORIDE 20 MEQ/1
40 TABLET, EXTENDED RELEASE ORAL EVERY 4 HOURS
Status: COMPLETED | OUTPATIENT
Start: 2021-08-13 | End: 2021-08-13

## 2021-08-13 NOTE — CONSULTS
[facility]  Report of Consultation    Josue Cisneros Patient Status:  Observation    3/22/1935 MRN [id number]   [facility] 7NE-A Attending Elly Cintron MD   Hosp Day # 0 PCP Linwood Mills MD     [facility] for [facility] SERVICES, [facility] ([facility]) Few drinks per week.     Family History:   No family history of premature arthrosclerotic heart disease     Medications:   Scheduled:   • potassium chloride  40 mEq Oral Q4H   • finasteride  5 mg Oral Daily   • hydrochlorothiazide  25 mg Oral Daily   • Levo total) by mouth 2 (two) times daily as needed for constipation. , Disp: 180 capsule, Rfl: 3  glipiZIDE 5 MG Oral Tab, Take 1 tablet (5 mg total) by mouth every morning before breakfast., Disp: 90 tablet, Rfl: 3  Levothyroxine Sodium 88 MCG Oral Tab, Take 1 non-displaced with a normal apical impulse. Lungs: Clear to ascultation bilaterally. No focal rales, rhonchi, or wheezes. Good air movement is noted throughout all lung fields. Abdomen: Soft. Non-distended. Non-tender.   Bowel sounds are present and n Tele monitor  8.   Further recommendations pending above    Carmen Rodriguez MD  8/13/2021  9:30 AM

## 2021-08-13 NOTE — H&P
[facility] HOSPITALIST  History and Physical     [facility] Patient Status:  Observation    3/22/1935 MRN [id number]   [facility] 7NE-A Attending Annabelle Robert [address] Day # 0 PCP Ulis Sever, MD     Chief Complaint: palpitati Location: [facility] FOR PAIN MANAGEMENT   • DRAIN/INJECT LARGE JOINT/BURSA Right 7/7/2015    Procedure: SI JOINT INJECTION;  Surgeon: Varinder Randall MD;  Location: [address] [place] [address] FOR PAIN MANAGEMENT   • ECHO GUIDE NEEDLE BIOPSY Right 4/7/2015    Procedure: NERVE INJECTION;  Surgeon: Romi Zhu MD;  Location: [address] [facility] [address] PAIN MANAGEMENT   • INJECTION; SINGLE/MULTIPLE TRIGGER POINT(S), 1/2 MUSCLE Right 8/24/2015    Procedure: PIRIFORMIS/SCIATIC NERVE INJECTION;  Surgeon: Lawson Mena MD;  Location PROPHYLAXIS.  5/19/2015    Procedure: ;  Surgeon: Jaspreet Wilburn MD;  Location: [facility] FOR PAIN MANAGEMENT   • PATIENT [address] FOR IV ANTIBIOTIC SURGICAL SITE INFECTION PROPHYLAXIS.  Left 6/9/2015    Procedure: SI JOINT INJECTION; 90 tablet, Rfl: 0  SPIRONOLACTONE 25 MG Oral Tab, TAKE 1/2 TABLET(12.5 MG) BY MOUTH DAILY, Disp: 45 tablet, Rfl: 0  hydrochlorothiazide 25 MG Oral Tab, Take 1 tablet (25 mg total) by mouth daily. , Disp: 90 tablet, Rfl: 2  Warfarin Sodium 5 MG Oral Tab, Garcia Mahoney times daily. , Disp: , Rfl:         Review of Systems:   A comprehensive 14 point review of systems was completed. Pertinent positives and negatives noted in the HPI.     Physical Exam:    /81 (BP Location: Right arm)   Pulse 92   Temp 97.7 °F (36 results for input(s): CK in the last 168 hours. Inflammatory Markers  No results for input(s): CRP, WINDY, LDH, DDIMER in the last 168 hours. Imaging: Imaging data reviewed in Epic. ASSESSMENT / PLAN:     1.  Atrial fibrillation-patient currently r

## 2021-08-13 NOTE — PROGRESS NOTES
[facility] HOSPITAL[facility]  Progress Note     Joao Bridge Patient Status:  Observation    3/22/1935 MRN [id number]   [facility] 7NE-A Attending Karri Huntley MD   Hosp Day # 0 PCP Pedro Gtz MD     Chief Complaint: palpitations, L arm 2.56* 2.41*            COVID-19 Lab Results    COVID-19  Lab Results   Component Value Date    COVID19 Not Detected 08/12/2021       Pro-Calcitonin  No results for input(s): PCT in the last 168 hours.     Cardiac  Recent Labs   Lab 08/12/21  1629 08/12/21 TCC on discharge?: no  Estimated date of discharge: tbd  Discharge is dependent on: progress  At this point Mr. Narayan Blackwell is expected to be discharge to: home    Plan of care discussed with patient, Lee Albarado MD

## 2021-08-13 NOTE — DISCHARGE SUMMARY
[facility] HOSPITALIST  DISCHARGE SUMMARY     Renae Leroy Patient Status:  Observation    3/22/1935 MRN [id number]   [facility] 7NE-A Attending Kristeen Hashimoto, MD   Hosp Day # 0 PCP Ajay Narvaez MD     Date of Admission: 2021  Date o as: DocQLace  Notes to patient: Continue home medication as previously prescribed      Take 1 capsule (100 mg total) by mouth 2 (two) times daily as needed for constipation.    Quantity: 180 capsule  Refills: 3     finasteride 5 MG Tabs  Commonly known as: tablet  Refills: 3     Precision Xtra Blood Glucose Strp  Notes to patient: Continue home medication as previously prescribed      USE TO TEST BLOOD SUGAR ONCE A DAY   Quantity: 100 strip  Refills: 0     OCUVITE EXTRA OR  Notes to patient: Continue home me visit  Schedule hospital follow-up in one week    MD Blanca Bruce [id number]  [phone]    Go to  Scheduled appointment on 8/25 with APN    Appointments for Next 30 Days 8/16/2021 - 9/15/2021      Date Arrival T trouble walking distances. Elevator to [address].                     Vital signs:       -----------------------------------------------------------------------------------------------  PATIENT DISCHARGE INSTRUCTIONS: See electronic chart    Diana Huber

## 2021-08-13 NOTE — PROGRESS NOTES
CARDIODIAGNOSTIC PRELIMINARY REPORT:     Mikle Severance completed, tolerated well     Second set of images pending

## 2021-08-13 NOTE — PLAN OF CARE
NURSING ADMISSION NOTE      Patient admitted via Cart  Oriented to room. Safety precautions initiated. Bed in low position. Call light in reach. Assumed care of pt at 2230. Pt is alert and oriented. NSR w/ a <2 second pause on tele.  Intermitt

## 2021-08-14 NOTE — PLAN OF CARE
Assumed care of patient at 07:30 am  A/O x 4. Denies SOB and any pain. ECHO and Lexiscan completed. Patient medically cleared to discharge home per all consults.       Problem: Diabetes/Glucose Control  Goal: Glucose maintained within prescribed range

## 2021-08-14 NOTE — PROGRESS NOTES
NURSING DISCHARGE NOTE    Discharge AVS completed. Discharge orders and instructions given and reviewed with patient and daughter at bedside. All questions answered, verbalized understanding.   Signs and symptoms to call 911 and return to hospital  rev

## 2021-08-16 ENCOUNTER — PATIENT OUTREACH (OUTPATIENT)
Dept: CASE MANAGEMENT | Age: 86
End: 2021-08-16

## 2021-08-17 NOTE — PROGRESS NOTES
[employer] message sent to the pt for TCM. NCM contact information was included in message. TCC contact information was left in the [facility] for the pt as well.

## 2021-09-07 ENCOUNTER — TELEPHONE (OUTPATIENT)
Dept: INTERNAL MEDICINE CLINIC | Facility: CLINIC | Age: 86
End: 2021-09-07

## 2021-09-09 DIAGNOSIS — E03.9 HYPOTHYROIDISM, UNSPECIFIED TYPE: ICD-10-CM

## 2021-09-09 DIAGNOSIS — I48.91 ATRIAL FIBRILLATION, UNSPECIFIED TYPE (HCC): ICD-10-CM

## 2021-09-09 DIAGNOSIS — Z00.00 ROUTINE GENERAL MEDICAL EXAMINATION AT A HEALTH CARE FACILITY: ICD-10-CM

## 2021-09-09 DIAGNOSIS — E78.2 MIXED HYPERLIPIDEMIA: ICD-10-CM

## 2021-09-09 DIAGNOSIS — R26.89 NEED FOR ASSISTANCE DUE TO UNSTEADY GAIT: ICD-10-CM

## 2021-09-09 DIAGNOSIS — E11.9 CONTROLLED TYPE 2 DIABETES MELLITUS WITHOUT COMPLICATION, WITHOUT LONG-TERM CURRENT USE OF INSULIN (HCC): ICD-10-CM

## 2021-09-09 DIAGNOSIS — I10 ESSENTIAL HYPERTENSION: ICD-10-CM

## 2021-09-09 DIAGNOSIS — M48.062 SPINAL STENOSIS OF LUMBAR REGION WITH NEUROGENIC CLAUDICATION: ICD-10-CM

## 2021-09-10 ENCOUNTER — TELEPHONE (OUTPATIENT)
Dept: INTERNAL MEDICINE CLINIC | Facility: CLINIC | Age: 86
End: 2021-09-10

## 2021-09-10 DIAGNOSIS — M48.062 SPINAL STENOSIS OF LUMBAR REGION WITH NEUROGENIC CLAUDICATION: ICD-10-CM

## 2021-09-10 DIAGNOSIS — I48.0 PAF (PAROXYSMAL ATRIAL FIBRILLATION) (HCC): Primary | ICD-10-CM

## 2021-09-10 DIAGNOSIS — R26.89 NEED FOR ASSISTANCE DUE TO UNSTEADY GAIT: ICD-10-CM

## 2021-09-10 NOTE — TELEPHONE ENCOUNTER
Kristin from [facility] is requesting referral on behalf of pt. Pt has appointment on 9/10 for his initial evaluation for gait and balance. Kristin stated they will send over evaluation information to Dr Tobi Strong today.     Kristin - [phone]

## 2021-09-10 NOTE — TELEPHONE ENCOUNTER
Referral faxed to [id number] [facility] at [phone] and confirmation received. Pt notified through [employer].

## 2021-09-11 RX ORDER — LEVOTHYROXINE SODIUM 88 UG/1
88 TABLET ORAL
Qty: 90 TABLET | Refills: 0 | Status: SHIPPED | OUTPATIENT
Start: 2021-09-11 | End: 2021-12-08

## 2021-09-16 ENCOUNTER — TELEPHONE (OUTPATIENT)
Dept: INTERNAL MEDICINE CLINIC | Facility: CLINIC | Age: 86
End: 2021-09-16

## 2021-09-16 ENCOUNTER — LAB ENCOUNTER (OUTPATIENT)
Dept: LAB | Age: 86
End: 2021-09-16
Attending: INTERNAL MEDICINE
Payer: MEDICARE

## 2021-09-16 DIAGNOSIS — E11.69 DIABETES MELLITUS TYPE 2 IN OBESE (HCC): ICD-10-CM

## 2021-09-16 DIAGNOSIS — E66.9 DIABETES MELLITUS TYPE 2 IN OBESE (HCC): ICD-10-CM

## 2021-09-16 DIAGNOSIS — E03.9 ACQUIRED HYPOTHYROIDISM: ICD-10-CM

## 2021-09-16 LAB
CHOLEST SERPL-MCNC: 156 MG/DL (ref ?–200)
EST. AVERAGE GLUCOSE BLD GHB EST-MCNC: 143 MG/DL (ref 68–126)
HBA1C MFR BLD HPLC: 6.6 % (ref ?–5.7)
HDLC SERPL-MCNC: 40 MG/DL (ref 40–59)
LDLC SERPL CALC-MCNC: 89 MG/DL (ref ?–100)
NONHDLC SERPL-MCNC: 116 MG/DL (ref ?–130)
PATIENT FASTING Y/N/NP: YES
T4 FREE SERPL-MCNC: 1.1 NG/DL (ref 0.8–1.7)
TRIGL SERPL-MCNC: 154 MG/DL (ref 30–149)
TSI SER-ACNC: 3.18 MIU/ML (ref 0.36–3.74)
VLDLC SERPL CALC-MCNC: 25 MG/DL (ref 0–30)

## 2021-09-16 PROCEDURE — 36415 COLL VENOUS BLD VENIPUNCTURE: CPT

## 2021-09-16 PROCEDURE — 84439 ASSAY OF FREE THYROXINE: CPT

## 2021-09-16 PROCEDURE — 83036 HEMOGLOBIN GLYCOSYLATED A1C: CPT

## 2021-09-16 PROCEDURE — 80061 LIPID PANEL: CPT

## 2021-09-16 PROCEDURE — 84443 ASSAY THYROID STIM HORMONE: CPT

## 2021-09-16 NOTE — TELEPHONE ENCOUNTER
Incoming fax from [facility] therapy and balance [place] with Plan of care to be reviewed and signed   Placed in 2018 [facility]

## 2021-09-29 ENCOUNTER — TELEPHONE (OUTPATIENT)
Dept: INTERNAL MEDICINE CLINIC | Facility: CLINIC | Age: 86
End: 2021-09-29

## 2021-09-29 NOTE — TELEPHONE ENCOUNTER
Contreras Salas from Dr Vivian Menchaca office requesting last office visit notes to be faxed for Dr Vivian Menchaca.      Fax - [phone]

## 2021-10-04 ENCOUNTER — OFFICE VISIT (OUTPATIENT)
Dept: INTERNAL MEDICINE CLINIC | Facility: CLINIC | Age: 86
End: 2021-10-04
Payer: MEDICARE

## 2021-10-04 VITALS
SYSTOLIC BLOOD PRESSURE: 116 MMHG | BODY MASS INDEX: 31.94 KG/M2 | HEIGHT: 69 IN | HEART RATE: 108 BPM | WEIGHT: 215.63 LBS | DIASTOLIC BLOOD PRESSURE: 58 MMHG | OXYGEN SATURATION: 98 % | RESPIRATION RATE: 20 BRPM | TEMPERATURE: 98 F

## 2021-10-04 DIAGNOSIS — E11.69 DIABETES MELLITUS TYPE 2 IN OBESE (HCC): ICD-10-CM

## 2021-10-04 DIAGNOSIS — I15.2 HYPERTENSION ASSOCIATED WITH DIABETES (HCC): ICD-10-CM

## 2021-10-04 DIAGNOSIS — D12.6 TUBULAR ADENOMA OF COLON: ICD-10-CM

## 2021-10-04 DIAGNOSIS — N40.1 BENIGN PROSTATIC HYPERPLASIA WITH INCOMPLETE BLADDER EMPTYING: ICD-10-CM

## 2021-10-04 DIAGNOSIS — E03.9 ACQUIRED HYPOTHYROIDISM: ICD-10-CM

## 2021-10-04 DIAGNOSIS — G47.33 OSA (OBSTRUCTIVE SLEEP APNEA): ICD-10-CM

## 2021-10-04 DIAGNOSIS — K21.00 GASTROESOPHAGEAL REFLUX DISEASE WITH ESOPHAGITIS WITHOUT HEMORRHAGE: ICD-10-CM

## 2021-10-04 DIAGNOSIS — E11.69 HYPERLIPIDEMIA DUE TO TYPE 2 DIABETES MELLITUS (HCC): ICD-10-CM

## 2021-10-04 DIAGNOSIS — R39.14 BENIGN PROSTATIC HYPERPLASIA WITH INCOMPLETE BLADDER EMPTYING: ICD-10-CM

## 2021-10-04 DIAGNOSIS — M48.062 SPINAL STENOSIS OF LUMBAR REGION WITH NEUROGENIC CLAUDICATION: ICD-10-CM

## 2021-10-04 DIAGNOSIS — D64.9 NORMOCYTIC ANEMIA: ICD-10-CM

## 2021-10-04 DIAGNOSIS — Z00.00 ROUTINE GENERAL MEDICAL EXAMINATION AT A HEALTH CARE FACILITY: Primary | ICD-10-CM

## 2021-10-04 DIAGNOSIS — E78.5 HYPERLIPIDEMIA DUE TO TYPE 2 DIABETES MELLITUS (HCC): ICD-10-CM

## 2021-10-04 DIAGNOSIS — E11.59 HYPERTENSION ASSOCIATED WITH DIABETES (HCC): ICD-10-CM

## 2021-10-04 DIAGNOSIS — Z13.31 SCREENING FOR DEPRESSION: ICD-10-CM

## 2021-10-04 DIAGNOSIS — E66.9 DIABETES MELLITUS TYPE 2 IN OBESE (HCC): ICD-10-CM

## 2021-10-04 DIAGNOSIS — I48.0 PAF (PAROXYSMAL ATRIAL FIBRILLATION) (HCC): ICD-10-CM

## 2021-10-04 PROBLEM — I20.89 ANGINAL EQUIVALENT: Status: RESOLVED | Noted: 2021-08-12 | Resolved: 2021-10-04

## 2021-10-04 PROBLEM — R00.2 PALPITATIONS: Status: RESOLVED | Noted: 2021-08-12 | Resolved: 2021-10-04

## 2021-10-04 PROBLEM — S80.01XA TRAUMATIC HEMATOMA OF RIGHT KNEE: Status: RESOLVED | Noted: 2021-07-28 | Resolved: 2021-10-04

## 2021-10-04 PROBLEM — E11.65 HYPERGLYCEMIA DUE TO DIABETES MELLITUS (HCC): Status: RESOLVED | Noted: 2021-01-06 | Resolved: 2021-10-04

## 2021-10-04 PROBLEM — I20.8 ANGINAL EQUIVALENT (HCC): Status: RESOLVED | Noted: 2021-08-12 | Resolved: 2021-10-04

## 2021-10-04 PROBLEM — I20.89 ANGINAL EQUIVALENT (HCC): Status: RESOLVED | Noted: 2021-08-12 | Resolved: 2021-10-04

## 2021-10-04 PROCEDURE — 93000 ELECTROCARDIOGRAM COMPLETE: CPT | Performed by: INTERNAL MEDICINE

## 2021-10-04 PROCEDURE — G0439 PPPS, SUBSEQ VISIT: HCPCS | Performed by: INTERNAL MEDICINE

## 2021-10-04 PROCEDURE — 99214 OFFICE O/P EST MOD 30 MIN: CPT | Performed by: INTERNAL MEDICINE

## 2021-10-04 RX ORDER — TAMSULOSIN HYDROCHLORIDE 0.4 MG/1
0.4 CAPSULE ORAL SEE ADMIN INSTRUCTIONS
Qty: 90 CAPSULE | Refills: 3 | COMMUNITY
Start: 2021-10-04

## 2021-10-04 NOTE — PATIENT INSTRUCTIONS
Please complete fasting labs and provide a urine sample in March, 2022. I do advise you get the TDAP (tetanus, diptheriae, pertussis) vaccine every 10 years but it can cost up to $65.    I advise you get the annual flu shot every September, October.

## 2021-10-04 NOTE — PROGRESS NOTES
Geoff Lorenzo is a 80year old male. HPI:   Patient presents for medicare wellness exam and additional issues noted below. 1. Afib: was admitted from 8/12-8/13 with afib and RVR and it self resolved. No intervention was done.    2. COVID booster: fee of Onset   • Cancer Father    • Cancer Mother    • Heart Disease Neg    • Stroke Neg       Past Medical History:   Diagnosis Date   • Arrhythmia     HX AFIB DX 3 YRS AGO   • Arthritis    • Atrial fibrillation ([facility] [address]    • Back problem     middle cage in back FLUOROSCOPIC GUIDANCE NEEDLE PLACEMENT N/A 4/27/2015    Procedure: LUMBAR EPIDURAL;  Surgeon: Komal Jack MD;  Location: [address] [place] [address]   • HIP REPLACEMENT SURGERY  8/1/2005, 8/15/2008    L in 2005, R in 2008   • [place] [address] FOLLOWING EVENTS  5/19/2015    Procedure: ;  Surgeon: Nataly Lacey MD;  Location: [address] [place] [address] FOR PAIN MANAGEMENT   • PATIENT DOCUMENTED NOT TO HAVE EXPERIENCED ANY OF THE FOLLOWING EVENTS Left 6/9/2015    Procedure: SI JOINT INJECTION;  Surgeon: Benigno Gutierrez Surgeon: Yun Hayes MD;  Location: [address] [place] [address] FOR PAIN MANAGEMENT   • PATIENT [address] FOR IV ANTIBIOTIC SURGICAL SITE INFECTION PROPHYLAXIS.  Right 8/24/2015    Procedure: PIRIFORMIS/SCIATIC NERVE INJECTION;  Surgeon: Zayda Polo reach out to cardiology. EKG today - rate 86, irregular, no ischemia, normal intervals.    # KRISTIAN: using CPAP nightly   # Venous insufficiency of both lower extremities: continue compression stockings and elevation.   # Gait Instability: 2/2 spinal stenosi and norvasc (swelling)  # HLP assoc with DM: well controlled on pravastatin  # GERD: ranitidine was not effective. Warned about long term PPI use but he needs daily omeprazole for quality of life.    # Health Maintenance: medicare wellness exam 10/4/2021  C

## 2021-10-05 DIAGNOSIS — E11.9 DIABETES MELLITUS WITHOUT COMPLICATION (HCC): ICD-10-CM

## 2021-10-05 RX ORDER — SPIRONOLACTONE 25 MG/1
12.5 TABLET ORAL DAILY
Qty: 45 TABLET | Refills: 3 | Status: SHIPPED | OUTPATIENT
Start: 2021-10-05 | End: 2021-12-19

## 2021-10-05 NOTE — TELEPHONE ENCOUNTER
Protocol Diabetic Medication Protocol Passed    Hypertension Medications Protocol Passed         Requesting:   Name from pharmacy: METFORMIN 1000MG TABLETS          Will file in chart as: METFORMIN HCL 1000 MG Oral Tab    Sig: TAKE 1 TABLET BY MOUTH TWICE

## 2021-10-09 ENCOUNTER — TELEPHONE (OUTPATIENT)
Dept: INTERNAL MEDICINE CLINIC | Facility: CLINIC | Age: 86
End: 2021-10-09

## 2021-10-09 NOTE — TELEPHONE ENCOUNTER
I discussed patient's tachycardia/SVT with his cardiologist, Vu BRITO, and with patient. - as long as patient remains asymptomatic continued observation is appropriate.

## 2021-10-10 DIAGNOSIS — N40.1 BENIGN PROSTATIC HYPERPLASIA WITH INCOMPLETE BLADDER EMPTYING: ICD-10-CM

## 2021-10-10 DIAGNOSIS — R39.14 BENIGN PROSTATIC HYPERPLASIA WITH INCOMPLETE BLADDER EMPTYING: ICD-10-CM

## 2021-10-11 RX ORDER — HYDROCHLOROTHIAZIDE 25 MG/1
25 TABLET ORAL DAILY
Qty: 90 TABLET | Refills: 2 | Status: SHIPPED | OUTPATIENT
Start: 2021-10-11

## 2021-10-11 RX ORDER — FINASTERIDE 5 MG/1
5 TABLET, FILM COATED ORAL DAILY
Qty: 90 TABLET | Refills: 3 | Status: SHIPPED | OUTPATIENT
Start: 2021-10-11 | End: 2022-11-04

## 2021-10-11 NOTE — TELEPHONE ENCOUNTER
hydroCHLOROthiazide 25 MG Oral Tab          Sig: Take 1 tablet (25 mg total) by mouth daily.     Disp:  90 tablet    Refills:  2    Start: 10/10/2021    Class: Normal    Non-formulary    Last ordered: 5 months ago by Brandi Khan MD     Hypertension Medicat

## 2021-10-18 ENCOUNTER — TELEPHONE (OUTPATIENT)
Dept: INTERNAL MEDICINE CLINIC | Facility: CLINIC | Age: 86
End: 2021-10-18

## 2021-11-03 ENCOUNTER — TELEPHONE (OUTPATIENT)
Dept: INTERNAL MEDICINE CLINIC | Facility: CLINIC | Age: 86
End: 2021-11-03

## 2021-12-07 DIAGNOSIS — I10 ESSENTIAL HYPERTENSION: ICD-10-CM

## 2021-12-07 DIAGNOSIS — I48.91 ATRIAL FIBRILLATION, UNSPECIFIED TYPE (HCC): ICD-10-CM

## 2021-12-07 DIAGNOSIS — M48.062 SPINAL STENOSIS OF LUMBAR REGION WITH NEUROGENIC CLAUDICATION: ICD-10-CM

## 2021-12-07 DIAGNOSIS — E78.2 MIXED HYPERLIPIDEMIA: ICD-10-CM

## 2021-12-07 DIAGNOSIS — R26.89 NEED FOR ASSISTANCE DUE TO UNSTEADY GAIT: ICD-10-CM

## 2021-12-07 DIAGNOSIS — E03.9 HYPOTHYROIDISM, UNSPECIFIED TYPE: ICD-10-CM

## 2021-12-07 DIAGNOSIS — E11.9 CONTROLLED TYPE 2 DIABETES MELLITUS WITHOUT COMPLICATION, WITHOUT LONG-TERM CURRENT USE OF INSULIN (HCC): ICD-10-CM

## 2021-12-07 DIAGNOSIS — Z00.00 ROUTINE GENERAL MEDICAL EXAMINATION AT A HEALTH CARE FACILITY: ICD-10-CM

## 2021-12-08 RX ORDER — LEVOTHYROXINE SODIUM 88 UG/1
TABLET ORAL
Qty: 90 TABLET | Refills: 0 | Status: SHIPPED | OUTPATIENT
Start: 2021-12-08 | End: 2021-12-10

## 2021-12-08 RX ORDER — BLOOD SUGAR DIAGNOSTIC
STRIP MISCELLANEOUS
Qty: 100 STRIP | Refills: 11 | Status: SHIPPED | OUTPATIENT
Start: 2021-12-08 | End: 2021-12-21

## 2021-12-10 ENCOUNTER — PATIENT MESSAGE (OUTPATIENT)
Dept: INTERNAL MEDICINE CLINIC | Facility: CLINIC | Age: 86
End: 2021-12-10

## 2021-12-10 DIAGNOSIS — E03.9 HYPOTHYROIDISM, UNSPECIFIED TYPE: ICD-10-CM

## 2021-12-10 DIAGNOSIS — E78.2 MIXED HYPERLIPIDEMIA: ICD-10-CM

## 2021-12-10 DIAGNOSIS — Z00.00 ROUTINE GENERAL MEDICAL EXAMINATION AT A HEALTH CARE FACILITY: ICD-10-CM

## 2021-12-10 DIAGNOSIS — I48.91 ATRIAL FIBRILLATION, UNSPECIFIED TYPE (HCC): ICD-10-CM

## 2021-12-10 DIAGNOSIS — M48.062 SPINAL STENOSIS OF LUMBAR REGION WITH NEUROGENIC CLAUDICATION: ICD-10-CM

## 2021-12-10 DIAGNOSIS — R26.89 NEED FOR ASSISTANCE DUE TO UNSTEADY GAIT: ICD-10-CM

## 2021-12-10 DIAGNOSIS — E11.9 CONTROLLED TYPE 2 DIABETES MELLITUS WITHOUT COMPLICATION, WITHOUT LONG-TERM CURRENT USE OF INSULIN (HCC): ICD-10-CM

## 2021-12-10 DIAGNOSIS — I10 ESSENTIAL HYPERTENSION: ICD-10-CM

## 2021-12-10 RX ORDER — LEVOTHYROXINE SODIUM 88 UG/1
88 TABLET ORAL
Qty: 90 TABLET | Refills: 0 | Status: SHIPPED | OUTPATIENT
Start: 2021-12-10

## 2021-12-10 NOTE — TELEPHONE ENCOUNTER
Maritza Mendez RN 12/10/2021 11:00 AM CST      ----- Message -----  From: Nasima Key  Sent: 12/10/2021 10:37 AM CST  To: [facility] Clinical Staff  Subject: MEDICATION REFILLS     There was confusion at [address] when I picked my levothyroxine meds.  You

## 2021-12-19 DIAGNOSIS — E11.9 DIABETES MELLITUS WITHOUT COMPLICATION (HCC): ICD-10-CM

## 2021-12-20 RX ORDER — BLOOD SUGAR DIAGNOSTIC
STRIP MISCELLANEOUS
Qty: 100 STRIP | Refills: 11 | OUTPATIENT
Start: 2021-12-20

## 2021-12-20 RX ORDER — SPIRONOLACTONE 25 MG/1
12.5 TABLET ORAL DAILY
Qty: 45 TABLET | Refills: 3 | Status: SHIPPED | OUTPATIENT
Start: 2021-12-20

## 2021-12-20 NOTE — TELEPHONE ENCOUNTER
Glucose Blood (PRECISION XTRA BLOOD GLUCOSE) In Vitro Strip          Sig: E11.65, USE TO TEST BLOOD SUGAR ONCE A DAY    Disp:  100 strip    Refills:  11    Start: 12/19/2021    Class: Normal    Non-formulary    Last ordered: 1 week ago by Irving Ruvalcaba MD

## 2021-12-20 NOTE — TELEPHONE ENCOUNTER
spironolactone 25 MG Oral Tab         Sig: Take 0.5 tablets (12.5 mg total) by mouth daily.     Disp:  45 tablet    Refills:  3    Start: 12/19/2021    Class: Normal    Non-formulary For: Diabetes mellitus [facility]    Last ordered: 2 braeden

## 2021-12-21 ENCOUNTER — TELEPHONE (OUTPATIENT)
Dept: INTERNAL MEDICINE CLINIC | Facility: CLINIC | Age: 86
End: 2021-12-21

## 2021-12-21 RX ORDER — BLOOD SUGAR DIAGNOSTIC
STRIP MISCELLANEOUS
Qty: 100 STRIP | Refills: 11 | OUTPATIENT
Start: 2021-12-21

## 2021-12-21 RX ORDER — BLOOD SUGAR DIAGNOSTIC
STRIP MISCELLANEOUS
Qty: 100 STRIP | Refills: 11 | Status: SHIPPED | OUTPATIENT
Start: 2021-12-21

## 2021-12-21 NOTE — TELEPHONE ENCOUNTER
Pt calling to check on status of glucose strips refill request. Explained it was sent to [facility] on 12/08/2021 and that is why additional requests are being denied.  I called [facility] and they said they never received the rx but they don't have the strip

## 2021-12-24 NOTE — PATIENT INSTRUCTIONS
[facility]     Please look at your medication bottle for your losartan/hctz pill. I think you are only on losartan 100 mg alone and you are no longer on hydrochlorothiazide pill. You will be due for fasting labs in April, 2020. No

## 2021-12-27 RX ORDER — BLOOD SUGAR DIAGNOSTIC
STRIP MISCELLANEOUS
Qty: 100 STRIP | Refills: 0 | Status: CANCELLED | OUTPATIENT
Start: 2021-12-27

## 2021-12-29 ENCOUNTER — TELEPHONE (OUTPATIENT)
Dept: INTERNAL MEDICINE CLINIC | Facility: CLINIC | Age: 86
End: 2021-12-29

## 2021-12-29 RX ORDER — BLOOD SUGAR DIAGNOSTIC
STRIP MISCELLANEOUS
Qty: 100 STRIP | Refills: 0 | COMMUNITY
Start: 2021-12-29 | End: 2021-12-29

## 2021-12-29 RX ORDER — LANCETS 30 GAUGE
EACH MISCELLANEOUS
Qty: 100 EACH | Refills: 0 | Status: SHIPPED | OUTPATIENT
Start: 2021-12-29

## 2021-12-29 RX ORDER — BLOOD KETONE GLUCOSE MONITOR
1 KIT MISCELLANEOUS DAILY
Qty: 1 KIT | Refills: 0 | Status: SHIPPED | OUTPATIENT
Start: 2021-12-29

## 2021-12-29 RX ORDER — BLOOD SUGAR DIAGNOSTIC
STRIP MISCELLANEOUS
Qty: 100 STRIP | Refills: 0 | Status: SHIPPED | OUTPATIENT
Start: 2021-12-29

## 2021-12-29 RX ORDER — BLOOD-GLUCOSE METER
EACH MISCELLANEOUS
Refills: 0 | Status: CANCELLED | OUTPATIENT
Start: 2021-12-29

## 2021-12-29 NOTE — TELEPHONE ENCOUNTER
Faxed detailed diabetic written order to [facility]   confirmation received   Sent to scan and copy placed in accordion

## 2021-12-29 NOTE — TELEPHONE ENCOUNTER
New testing device and strips sent to [facility] per KS     Spoke with [facility] and they have patients diabetic supply in stock   Patient notified STEPHANIE ambulatory encounter  FAMILY PRACTICE ANNUAL PHYSICAL EXAM    CHIEF COMPLAINT:  Establish Care and Forms Completion (Wellness form [place]. Patient is fasting. )       HISTORY OF PRESENT ILLNESS:    Angie Vaughan is a pleasant 31 year old female who presents today for     1. ESTABLISH CARE: hasn't had a primary care provider before. Is here to establish with me.     New concerns discussed include: none    PROBLEM LIST:    Patient Active Problem List   Diagnosis   • Obesity (BMI 30-39.9)       HISTORIES:    I have reviewed the past medical history, family history, social history, medications and allergies listed in the medical record as obtained by my nursing staff and support staff and agree with their documentation.    REVIEW OF SYSTEMS:    Constitutional: Negative for excessive fatigue and unexplained weight loss.   Skin: Negative for recurrent skin rash, moles that have changed in size or color.  HEENT: Negative for eye drainage, rhinorrhea, ear pain or hoarseness.  Respiratory: Negative for coughing up blood, chronic cough, wheezing.   Cardiovascular: Negative for chest pain, palpitations.   Gastrointestinal: Negative for tarry stools, recurrent abdominal pain, frequent nausea or vomiting.  Genitourinary: Negative for difficult or painful urination urination more than once a night.   Extremities: Negative for joint swelling or joint pain.  Neurologic: Negative for frequent or severe headaches, weakness or recurrent numbness or tingling in legs/arms.  Endocrine: Negative for history of diabetes and history of thyroid disease.   Hematological: Negative for swollen glands or lymph nodes, history of anemia or history of blood clots.   Psychiatric: Negative for change in affect, change in mentation or sleep disturbance.     PHYSICAL EXAM:    Visit Vitals  /70 (BP Location: [facility], Patient Position: Sitting, Cuff Size: Large Adult)   Pulse 80   Temp 97.8 °F (36.6 °C) (Oral)   Ht 5' 5.75\" (1.67 m)   Wt  87.1 kg   LMP 03/28/2018   BMI 31.23 kg/m²     General Appearance:  Alert, cooperative, no distress, appears stated age.  Head:  Normocephalic, without obvious abnormality, atraumatic.  Eyes:  Pupils equal, round and reactive to light, conjunctivae/corneas clear, extraocular movements intact, fundi benign, both eyes.  Ears:  Tympanic membranes and external ear canals normal, both ears.  Nose:  Nares normal, septum midline, mucosa normal, no drainage or sinus tenderness.  Throat:  Lips, mucosa, and tongue normal; teeth and gums normal.  Neck:  Supple, symmetrical, trachea midline, no adenopathy.  Thyroid has no enlargement/tenderness/nodules; no carotid bruit or jugular venous distention.  Back:  Symmetric, no curvature, range of motion normal, no costovertebral angle tenderness to palpation.  Lungs:  Clear to auscultation bilaterally, respirations unlabored.  Chest Wall:  No tenderness or deformity.  Heart:  Regular rate and rhythm, S1 and S2 normal, no murmur, rub or gallop.  Abdomen:  Soft, non-tender, bowel sounds active all four quadrants, no masses, no organomegaly.  Extremities:  Atraumatic, no cyanosis or edema.  Pulses:  2+ and symmetric all extremities.  Skin:  Skin color, texture, turgor normal, no rashes or lesions.  Lymph Nodes:  Cervical, supraclavicular, and axillary nodes normal.  Neurologic:  Cranial nerves II-XII intact, normal strength, sensation and reflexes throughout.  Breast Exam:  No asymmetry, masses or tenderness.  No nipple retraction or discharge.  Overlying skin without creasing, textural changes.     LABORATORY DATA:    Will obtain blood work today    IMAGING STUDIES:    N/A    ASSESSMENT:    1. Annual physical exam - discussed annual physicals yearly and to follow up with new or worsening symptoms   2. Encounter to establish care    3. Screening cholesterol level - will obtain fasting cholesterol panel for NARA paperwork   4. Screening for diabetes mellitus - will obtain fasting  glucose for NARA paperwork   5. Obesity (BMI 30-39.9) - discussed healthy eating habits and exercising daily       PLAN:    Orders Placed This Encounter   • Glucose Level   • Lipid Panel with Reflex   • levonorgestrel (KYLEENA) 19.5 MG intrauterine device       Return if symptoms worsen or fail to improve, for annual physical yearly.    Screenings/Treatments Needed:  Cardiovascular screening - Lipids  and Diabetes screening     Instructions provided as documented in the after visit summary.    The patient indicated understanding of the diagnosis and agreed with the plan of care. Patient verbally agrees with plan.  No further questions or concerns at this time.

## 2022-01-20 DIAGNOSIS — E03.9 ACQUIRED HYPOTHYROIDISM: ICD-10-CM

## 2022-01-20 DIAGNOSIS — M48.062 SPINAL STENOSIS OF LUMBAR REGION WITH NEUROGENIC CLAUDICATION: ICD-10-CM

## 2022-01-20 DIAGNOSIS — E11.9 CONTROLLED TYPE 2 DIABETES MELLITUS WITHOUT COMPLICATION, WITHOUT LONG-TERM CURRENT USE OF INSULIN (HCC): ICD-10-CM

## 2022-01-20 DIAGNOSIS — E11.9 DIABETES MELLITUS WITHOUT COMPLICATION (HCC): ICD-10-CM

## 2022-01-20 DIAGNOSIS — I10 ESSENTIAL HYPERTENSION: ICD-10-CM

## 2022-01-20 DIAGNOSIS — K59.04 CHRONIC IDIOPATHIC CONSTIPATION: ICD-10-CM

## 2022-01-21 RX ORDER — GLIPIZIDE 5 MG/1
TABLET ORAL
Qty: 90 TABLET | Refills: 3 | Status: SHIPPED | OUTPATIENT
Start: 2022-01-21

## 2022-01-21 NOTE — TELEPHONE ENCOUNTER
Name from pharmacy: GLIPIZIDE 5MG TABLETS         Will file in chart as: GLIPIZIDE 5 MG Oral Tab    Sig: TAKE 1 TABLET(5 MG) BY MOUTH EVERY MORNING BEFORE BREAKFAST    Disp:  90 tablet    Refills:  3 (Pharmacy requested: Not specified)    Start: 1/20/2022

## 2022-01-27 ENCOUNTER — TELEPHONE (OUTPATIENT)
Dept: INTERNAL MEDICINE CLINIC | Facility: CLINIC | Age: 87
End: 2022-01-27

## 2022-01-27 NOTE — TELEPHONE ENCOUNTER
April from [facility] called wanting to know if  will sign orders for cpap supplies. April states she is faxing over request now. See [employer] message reply to patient.  Will await response.    Ethan Mccullough RN

## 2022-01-27 NOTE — TELEPHONE ENCOUNTER
Incoming diabetic eye exam via fax from [facility]. HM has been updated. Report placed in KS in basket.

## 2022-01-28 NOTE — TELEPHONE ENCOUNTER
1st attempt- Called April back at [phone], left voicemail informing our office has not received ppw.

## 2022-02-01 ENCOUNTER — TELEPHONE (OUTPATIENT)
Dept: INTERNAL MEDICINE CLINIC | Facility: CLINIC | Age: 87
End: 2022-02-01

## 2022-02-04 NOTE — TELEPHONE ENCOUNTER
Form signed by KS and faxed back to [employer]. Copy sent to scan. Original form placed in accordion folder.

## 2022-02-16 ENCOUNTER — TELEPHONE (OUTPATIENT)
Dept: INTERNAL MEDICINE CLINIC | Facility: CLINIC | Age: 87
End: 2022-02-16

## 2022-02-16 NOTE — TELEPHONE ENCOUNTER
Mary from Dr. Cheryle Youngblood office called and stated Pt has an appointment coming up with dr. Hector Savage and he would like to review pt's most recent labs and progress notes from pt's primary doctor.  Please fax to [phone]

## 2022-02-20 DIAGNOSIS — E11.59 HYPERTENSION ASSOCIATED WITH DIABETES: ICD-10-CM

## 2022-02-20 DIAGNOSIS — E78.5 HYPERLIPIDEMIA DUE TO TYPE 2 DIABETES MELLITUS: ICD-10-CM

## 2022-02-20 DIAGNOSIS — E11.42 CONTROLLED TYPE 2 DIABETES MELLITUS WITH DIABETIC POLYNEUROPATHY, WITHOUT LONG-TERM CURRENT USE OF INSULIN (HCC): ICD-10-CM

## 2022-02-20 DIAGNOSIS — E78.2 MIXED HYPERLIPIDEMIA: ICD-10-CM

## 2022-02-20 DIAGNOSIS — I15.2 HYPERTENSION ASSOCIATED WITH DIABETES: ICD-10-CM

## 2022-02-20 DIAGNOSIS — E11.69 HYPERLIPIDEMIA DUE TO TYPE 2 DIABETES MELLITUS: ICD-10-CM

## 2022-02-20 DIAGNOSIS — I48.91 ATRIAL FIBRILLATION, UNSPECIFIED TYPE (HCC): ICD-10-CM

## 2022-02-22 RX ORDER — PRAVASTATIN SODIUM 40 MG
40 TABLET ORAL NIGHTLY
Qty: 90 TABLET | Refills: 0 | Status: SHIPPED | OUTPATIENT
Start: 2022-02-22 | End: 2022-05-23

## 2022-03-07 RX ORDER — LEVOTHYROXINE SODIUM 88 UG/1
88 TABLET ORAL
Qty: 90 TABLET | Refills: 1 | Status: SHIPPED | OUTPATIENT
Start: 2022-03-07

## 2022-03-29 ENCOUNTER — LAB ENCOUNTER (OUTPATIENT)
Dept: LAB | Age: 87
End: 2022-03-29
Attending: INTERNAL MEDICINE
Payer: MEDICARE

## 2022-03-29 DIAGNOSIS — R39.14 BENIGN PROSTATIC HYPERPLASIA WITH INCOMPLETE BLADDER EMPTYING: ICD-10-CM

## 2022-03-29 DIAGNOSIS — E11.59 HYPERTENSION ASSOCIATED WITH DIABETES (HCC): ICD-10-CM

## 2022-03-29 DIAGNOSIS — K21.00 GASTROESOPHAGEAL REFLUX DISEASE WITH ESOPHAGITIS WITHOUT HEMORRHAGE: ICD-10-CM

## 2022-03-29 DIAGNOSIS — I48.0 PAF (PAROXYSMAL ATRIAL FIBRILLATION) (HCC): ICD-10-CM

## 2022-03-29 DIAGNOSIS — G47.33 OSA (OBSTRUCTIVE SLEEP APNEA): ICD-10-CM

## 2022-03-29 DIAGNOSIS — E78.5 HYPERLIPIDEMIA DUE TO TYPE 2 DIABETES MELLITUS (HCC): ICD-10-CM

## 2022-03-29 DIAGNOSIS — E03.9 ACQUIRED HYPOTHYROIDISM: ICD-10-CM

## 2022-03-29 DIAGNOSIS — Z00.00 ROUTINE GENERAL MEDICAL EXAMINATION AT A HEALTH CARE FACILITY: ICD-10-CM

## 2022-03-29 DIAGNOSIS — I15.2 HYPERTENSION ASSOCIATED WITH DIABETES (HCC): ICD-10-CM

## 2022-03-29 DIAGNOSIS — E66.9 DIABETES MELLITUS TYPE 2 IN OBESE (HCC): ICD-10-CM

## 2022-03-29 DIAGNOSIS — D12.6 TUBULAR ADENOMA OF COLON: ICD-10-CM

## 2022-03-29 DIAGNOSIS — D64.9 NORMOCYTIC ANEMIA: ICD-10-CM

## 2022-03-29 DIAGNOSIS — N40.1 BENIGN PROSTATIC HYPERPLASIA WITH INCOMPLETE BLADDER EMPTYING: ICD-10-CM

## 2022-03-29 DIAGNOSIS — E11.69 HYPERLIPIDEMIA DUE TO TYPE 2 DIABETES MELLITUS (HCC): ICD-10-CM

## 2022-03-29 DIAGNOSIS — Z13.31 SCREENING FOR DEPRESSION: ICD-10-CM

## 2022-03-29 DIAGNOSIS — M48.062 SPINAL STENOSIS OF LUMBAR REGION WITH NEUROGENIC CLAUDICATION: ICD-10-CM

## 2022-03-29 DIAGNOSIS — E11.69 DIABETES MELLITUS TYPE 2 IN OBESE (HCC): ICD-10-CM

## 2022-03-29 LAB
ALT SERPL-CCNC: 27 U/L
ANION GAP SERPL CALC-SCNC: 8 MMOL/L (ref 0–18)
AST SERPL-CCNC: 20 U/L (ref 15–37)
BASOPHILS # BLD AUTO: 0.04 X10(3) UL (ref 0–0.2)
BASOPHILS NFR BLD AUTO: 0.6 %
BUN BLD-MCNC: 20 MG/DL (ref 7–18)
CALCIUM BLD-MCNC: 9.9 MG/DL (ref 8.5–10.1)
CHLORIDE SERPL-SCNC: 103 MMOL/L (ref 98–112)
CHOLEST SERPL-MCNC: 158 MG/DL (ref ?–200)
CO2 SERPL-SCNC: 27 MMOL/L (ref 21–32)
CREAT BLD-MCNC: 1 MG/DL
CREAT UR-SCNC: 85.3 MG/DL
EOSINOPHIL # BLD AUTO: 0.13 X10(3) UL (ref 0–0.7)
EOSINOPHIL NFR BLD AUTO: 2.1 %
ERYTHROCYTE [DISTWIDTH] IN BLOOD BY AUTOMATED COUNT: 13.4 %
EST. AVERAGE GLUCOSE BLD GHB EST-MCNC: 151 MG/DL (ref 68–126)
FASTING PATIENT LIPID ANSWER: YES
FASTING STATUS PATIENT QL REPORTED: YES
GLUCOSE BLD-MCNC: 173 MG/DL (ref 70–99)
HBA1C MFR BLD: 6.9 % (ref ?–5.7)
HCT VFR BLD AUTO: 36.3 %
HDLC SERPL-MCNC: 40 MG/DL (ref 40–59)
HGB BLD-MCNC: 12.1 G/DL
IMM GRANULOCYTES # BLD AUTO: 0.03 X10(3) UL (ref 0–1)
IMM GRANULOCYTES NFR BLD: 0.5 %
LDLC SERPL CALC-MCNC: 80 MG/DL (ref ?–100)
LYMPHOCYTES # BLD AUTO: 1.2 X10(3) UL (ref 1–4)
LYMPHOCYTES NFR BLD AUTO: 19.2 %
MCH RBC QN AUTO: 31.3 PG (ref 26–34)
MCHC RBC AUTO-ENTMCNC: 33.3 G/DL (ref 31–37)
MCV RBC AUTO: 94 FL
MICROALBUMIN UR-MCNC: 0.5 MG/DL
MICROALBUMIN/CREAT 24H UR-RTO: 5.9 UG/MG (ref ?–30)
MONOCYTES # BLD AUTO: 0.56 X10(3) UL (ref 0.1–1)
MONOCYTES NFR BLD AUTO: 9 %
NEUTROPHILS # BLD AUTO: 4.29 X10(3) UL (ref 1.5–7.7)
NEUTROPHILS NFR BLD AUTO: 68.6 %
NONHDLC SERPL-MCNC: 118 MG/DL (ref ?–130)
OSMOLALITY SERPL CALC.SUM OF ELEC: 293 MOSM/KG (ref 275–295)
PLATELET # BLD AUTO: 199 10(3)UL (ref 150–450)
POTASSIUM SERPL-SCNC: 3.8 MMOL/L (ref 3.5–5.1)
RBC # BLD AUTO: 3.86 X10(6)UL
SODIUM SERPL-SCNC: 138 MMOL/L (ref 136–145)
VLDLC SERPL CALC-MCNC: 36 MG/DL (ref 0–30)
WBC # BLD AUTO: 6.3 X10(3) UL (ref 4–11)

## 2022-03-29 PROCEDURE — 84460 ALANINE AMINO (ALT) (SGPT): CPT

## 2022-03-29 PROCEDURE — 36415 COLL VENOUS BLD VENIPUNCTURE: CPT

## 2022-03-29 PROCEDURE — 84450 TRANSFERASE (AST) (SGOT): CPT

## 2022-03-29 PROCEDURE — 82570 ASSAY OF URINE CREATININE: CPT

## 2022-03-29 PROCEDURE — 80048 BASIC METABOLIC PNL TOTAL CA: CPT

## 2022-03-29 PROCEDURE — 82043 UR ALBUMIN QUANTITATIVE: CPT

## 2022-03-29 PROCEDURE — 80061 LIPID PANEL: CPT

## 2022-03-29 PROCEDURE — 83036 HEMOGLOBIN GLYCOSYLATED A1C: CPT

## 2022-03-29 PROCEDURE — 85025 COMPLETE CBC W/AUTO DIFF WBC: CPT

## 2022-03-30 RX ORDER — TAMSULOSIN HYDROCHLORIDE 0.4 MG/1
0.4 CAPSULE ORAL SEE ADMIN INSTRUCTIONS
Qty: 90 CAPSULE | Refills: 0 | Status: SHIPPED | OUTPATIENT
Start: 2022-03-30

## 2022-04-05 RX ORDER — OMEPRAZOLE 20 MG/1
20 CAPSULE, DELAYED RELEASE ORAL
Qty: 90 CAPSULE | Refills: 3 | Status: SHIPPED | OUTPATIENT
Start: 2022-04-05

## 2022-04-05 NOTE — TELEPHONE ENCOUNTER
No Protocol     Requesting: Omeprazole 20mg     LOV: 10/4/21 with KS   RTC: 6 months   Filled: 3.29.21 #90 3 refills   Recent Labs: 3/29/22     Upcoming OV: none scheduled

## 2022-04-26 RX ORDER — LOSARTAN POTASSIUM 100 MG/1
TABLET ORAL
Qty: 90 TABLET | Refills: 2 | Status: SHIPPED | OUTPATIENT
Start: 2022-04-26

## 2022-05-03 ENCOUNTER — TELEPHONE (OUTPATIENT)
Dept: INTERNAL MEDICINE CLINIC | Facility: CLINIC | Age: 87
End: 2022-05-03

## 2022-05-03 NOTE — TELEPHONE ENCOUNTER
His A1C was very good one month ago. Thus, I would simply like him to share his blood sugars with me every 14 days and I will decide if any medication adjustmetn is needed.  I would like him to check fasting blood sugars and then 2 hours after any meal.

## 2022-05-03 NOTE — TELEPHONE ENCOUNTER
Pt scheduled appt for high blood sugar. Pt states he took his glucose this morning while fasting and prior to taking his metformin, pt states his blood sugar was 264. Pt states he took his metformin and then it raised to 280. Pt states he feels fine but is unsure what to do. Pt is requesting to do a video visit because he has to take care of his wife and cannot leave the house. Ok to keep VV for tomorrow ? Pt would also like to know if there is anything he should do, due to the high blood sugar.     Future Appointments   Date Time Provider Pam Mandujano   5/4/2022  1:00 PM Sage Adkins MD [facility]

## 2022-05-04 ENCOUNTER — TELEMEDICINE (OUTPATIENT)
Dept: INTERNAL MEDICINE CLINIC | Facility: CLINIC | Age: 87
End: 2022-05-04
Payer: MEDICARE

## 2022-05-04 DIAGNOSIS — E11.59 HYPERTENSION ASSOCIATED WITH DIABETES (HCC): ICD-10-CM

## 2022-05-04 DIAGNOSIS — E11.9 DIABETES MELLITUS WITHOUT COMPLICATION (HCC): ICD-10-CM

## 2022-05-04 DIAGNOSIS — E66.9 DIABETES MELLITUS TYPE 2 IN OBESE (HCC): Primary | ICD-10-CM

## 2022-05-04 DIAGNOSIS — N40.1 BENIGN PROSTATIC HYPERPLASIA WITH INCOMPLETE BLADDER EMPTYING: ICD-10-CM

## 2022-05-04 DIAGNOSIS — R39.14 BENIGN PROSTATIC HYPERPLASIA WITH INCOMPLETE BLADDER EMPTYING: ICD-10-CM

## 2022-05-04 DIAGNOSIS — I15.2 HYPERTENSION ASSOCIATED WITH DIABETES (HCC): ICD-10-CM

## 2022-05-04 DIAGNOSIS — E11.69 DIABETES MELLITUS TYPE 2 IN OBESE (HCC): Primary | ICD-10-CM

## 2022-05-04 PROCEDURE — 99214 OFFICE O/P EST MOD 30 MIN: CPT | Performed by: INTERNAL MEDICINE

## 2022-05-04 RX ORDER — BLOOD SUGAR DIAGNOSTIC
STRIP MISCELLANEOUS
Qty: 100 STRIP | Refills: 3 | Status: SHIPPED | OUTPATIENT
Start: 2022-05-04 | End: 2022-05-14

## 2022-05-04 NOTE — PATIENT INSTRUCTIONS
For your uncontrolled blood sugars, please increase your glipizide to 7.5 mg every day (take 1.5 tablets every day). Please email me your blood sugars in 14 days. For your high blood pressures, please increase the spironolactone to a full tablet every day (25 mg once daily ). Please measure your blood pressure and pulse daily and record these values. Please measure your blood pressure two times in the morning and two times in the evening (1 minute apart) after when you have been relaxing for at least 5 minutes. Both feet should be flat on the ground and you should be seated. Please bring these values in at your next visit. Please call us if your blood pressure is greater than 130/80 on 3 occasions.

## 2022-05-14 ENCOUNTER — TELEPHONE (OUTPATIENT)
Dept: INTERNAL MEDICINE CLINIC | Facility: CLINIC | Age: 87
End: 2022-05-14

## 2022-05-14 NOTE — TELEPHONE ENCOUNTER
Pt walked in office and dropped off envelope for Dr. Yuliana Stallworth. Placed in providers folder.

## 2022-05-16 RX ORDER — BLOOD SUGAR DIAGNOSTIC
STRIP MISCELLANEOUS
Qty: 100 STRIP | Refills: 3 | Status: SHIPPED | OUTPATIENT
Start: 2022-05-16

## 2022-05-22 DIAGNOSIS — I48.91 ATRIAL FIBRILLATION, UNSPECIFIED TYPE (HCC): ICD-10-CM

## 2022-05-22 DIAGNOSIS — E11.59 HYPERTENSION ASSOCIATED WITH DIABETES (HCC): ICD-10-CM

## 2022-05-22 DIAGNOSIS — E78.5 HYPERLIPIDEMIA DUE TO TYPE 2 DIABETES MELLITUS (HCC): ICD-10-CM

## 2022-05-22 DIAGNOSIS — E78.2 MIXED HYPERLIPIDEMIA: ICD-10-CM

## 2022-05-22 DIAGNOSIS — I15.2 HYPERTENSION ASSOCIATED WITH DIABETES (HCC): ICD-10-CM

## 2022-05-22 DIAGNOSIS — E11.42 CONTROLLED TYPE 2 DIABETES MELLITUS WITH DIABETIC POLYNEUROPATHY, WITHOUT LONG-TERM CURRENT USE OF INSULIN (HCC): ICD-10-CM

## 2022-05-22 DIAGNOSIS — E11.69 HYPERLIPIDEMIA DUE TO TYPE 2 DIABETES MELLITUS (HCC): ICD-10-CM

## 2022-05-23 RX ORDER — PRAVASTATIN SODIUM 40 MG
TABLET ORAL
Qty: 90 TABLET | Refills: 3 | Status: SHIPPED | OUTPATIENT
Start: 2022-05-23

## 2022-05-26 DIAGNOSIS — E11.9 DIABETES MELLITUS WITHOUT COMPLICATION (HCC): ICD-10-CM

## 2022-05-26 RX ORDER — SPIRONOLACTONE 25 MG/1
12.5 TABLET ORAL DAILY
Qty: 45 TABLET | Refills: 0 | Status: SHIPPED | OUTPATIENT
Start: 2022-05-26 | End: 2022-05-27

## 2022-05-27 RX ORDER — SPIRONOLACTONE 25 MG/1
25 TABLET ORAL DAILY
Qty: 90 TABLET | Refills: 0 | Status: SHIPPED | OUTPATIENT
Start: 2022-05-27

## 2022-06-02 ENCOUNTER — TELEPHONE (OUTPATIENT)
Dept: INTERNAL MEDICINE CLINIC | Facility: CLINIC | Age: 87
End: 2022-06-02

## 2022-06-03 DIAGNOSIS — E11.59 HYPERTENSION ASSOCIATED WITH DIABETES (HCC): Primary | ICD-10-CM

## 2022-06-03 DIAGNOSIS — I15.2 HYPERTENSION ASSOCIATED WITH DIABETES (HCC): Primary | ICD-10-CM

## 2022-06-14 DIAGNOSIS — E11.9 CONTROLLED TYPE 2 DIABETES MELLITUS WITHOUT COMPLICATION, WITHOUT LONG-TERM CURRENT USE OF INSULIN (HCC): ICD-10-CM

## 2022-06-14 DIAGNOSIS — K59.04 CHRONIC IDIOPATHIC CONSTIPATION: ICD-10-CM

## 2022-06-14 DIAGNOSIS — E03.9 ACQUIRED HYPOTHYROIDISM: ICD-10-CM

## 2022-06-14 DIAGNOSIS — M48.062 SPINAL STENOSIS OF LUMBAR REGION WITH NEUROGENIC CLAUDICATION: ICD-10-CM

## 2022-06-14 DIAGNOSIS — E11.9 DIABETES MELLITUS WITHOUT COMPLICATION (HCC): ICD-10-CM

## 2022-06-14 DIAGNOSIS — I10 ESSENTIAL HYPERTENSION: ICD-10-CM

## 2022-06-15 RX ORDER — GLIPIZIDE 5 MG/1
5 TABLET ORAL
Qty: 90 TABLET | Refills: 0 | Status: SHIPPED | OUTPATIENT
Start: 2022-06-15 | End: 2022-06-17

## 2022-06-16 DIAGNOSIS — E11.69 DIABETES MELLITUS TYPE 2 IN OBESE: Primary | ICD-10-CM

## 2022-06-16 DIAGNOSIS — E66.9 DIABETES MELLITUS TYPE 2 IN OBESE: Primary | ICD-10-CM

## 2022-06-17 RX ORDER — GLIPIZIDE 5 MG/1
5 TABLET ORAL
Qty: 180 TABLET | Refills: 0 | Status: SHIPPED | OUTPATIENT
Start: 2022-06-17 | End: 2022-08-27

## 2022-06-22 ENCOUNTER — TELEPHONE (OUTPATIENT)
Dept: INTERNAL MEDICINE CLINIC | Facility: CLINIC | Age: 87
End: 2022-06-22

## 2022-06-22 NOTE — TELEPHONE ENCOUNTER
Pt scheduled appt to go over lab results, pt would like to know if this appointment can remain as a VV    Future Appointments   Date Time Provider Pam Nazia   7/11/2022  2:30 PM Elaina Dc MD [facility] 8 [facility] [place]

## 2022-06-27 RX ORDER — DOCUSATE SODIUM 100 MG/1
100 CAPSULE, LIQUID FILLED ORAL 2 TIMES DAILY PRN
Qty: 180 CAPSULE | Refills: 3 | Status: SHIPPED | OUTPATIENT
Start: 2022-06-27

## 2022-06-28 DIAGNOSIS — N40.1 BENIGN PROSTATIC HYPERPLASIA WITH INCOMPLETE BLADDER EMPTYING: ICD-10-CM

## 2022-06-28 DIAGNOSIS — R39.14 BENIGN PROSTATIC HYPERPLASIA WITH INCOMPLETE BLADDER EMPTYING: ICD-10-CM

## 2022-06-29 RX ORDER — TAMSULOSIN HYDROCHLORIDE 0.4 MG/1
0.4 CAPSULE ORAL SEE ADMIN INSTRUCTIONS
Qty: 90 CAPSULE | Refills: 0 | Status: SHIPPED | OUTPATIENT
Start: 2022-06-29

## 2022-06-29 RX ORDER — DOCUSATE SODIUM 100 MG/1
100 CAPSULE, LIQUID FILLED ORAL 2 TIMES DAILY PRN
Qty: 48 CAPSULE | Refills: 1 | COMMUNITY
Start: 2022-06-29 | End: 2023-01-10

## 2022-07-01 ENCOUNTER — LAB ENCOUNTER (OUTPATIENT)
Dept: LAB | Age: 87
End: 2022-07-01
Attending: INTERNAL MEDICINE
Payer: MEDICARE

## 2022-07-01 DIAGNOSIS — E11.59 HYPERTENSION ASSOCIATED WITH DIABETES (HCC): ICD-10-CM

## 2022-07-01 DIAGNOSIS — I15.2 HYPERTENSION ASSOCIATED WITH DIABETES (HCC): ICD-10-CM

## 2022-07-01 LAB
EST. AVERAGE GLUCOSE BLD GHB EST-MCNC: 160 MG/DL (ref 68–126)
HBA1C MFR BLD: 7.2 % (ref ?–5.7)

## 2022-07-01 PROCEDURE — 36415 COLL VENOUS BLD VENIPUNCTURE: CPT

## 2022-07-01 PROCEDURE — 83036 HEMOGLOBIN GLYCOSYLATED A1C: CPT

## 2022-07-11 ENCOUNTER — TELEMEDICINE (OUTPATIENT)
Dept: INTERNAL MEDICINE CLINIC | Facility: CLINIC | Age: 87
End: 2022-07-11

## 2022-07-11 VITALS — SYSTOLIC BLOOD PRESSURE: 117 MMHG | HEART RATE: 66 BPM | DIASTOLIC BLOOD PRESSURE: 63 MMHG

## 2022-07-11 DIAGNOSIS — D64.9 NORMOCYTIC ANEMIA: ICD-10-CM

## 2022-07-11 DIAGNOSIS — E78.5 HYPERLIPIDEMIA DUE TO TYPE 2 DIABETES MELLITUS (HCC): ICD-10-CM

## 2022-07-11 DIAGNOSIS — E11.69 HYPERLIPIDEMIA DUE TO TYPE 2 DIABETES MELLITUS (HCC): ICD-10-CM

## 2022-07-11 DIAGNOSIS — E03.9 ACQUIRED HYPOTHYROIDISM: ICD-10-CM

## 2022-07-11 DIAGNOSIS — I15.2 HYPERTENSION ASSOCIATED WITH DIABETES (HCC): ICD-10-CM

## 2022-07-11 DIAGNOSIS — E66.9 DIABETES MELLITUS TYPE 2 IN OBESE (HCC): Primary | ICD-10-CM

## 2022-07-11 DIAGNOSIS — E11.69 DIABETES MELLITUS TYPE 2 IN OBESE (HCC): Primary | ICD-10-CM

## 2022-07-11 DIAGNOSIS — E11.59 HYPERTENSION ASSOCIATED WITH DIABETES (HCC): ICD-10-CM

## 2022-07-11 DIAGNOSIS — E55.9 VITAMIN D DEFICIENCY: ICD-10-CM

## 2022-07-11 NOTE — PATIENT INSTRUCTIONS
Please complete your labs in October, 2022 and see me in person for your wellness exam 1 week later.  You do not need to fast.

## 2022-07-25 RX ORDER — HYDROCHLOROTHIAZIDE 25 MG/1
25 TABLET ORAL DAILY
Qty: 90 TABLET | Refills: 0 | Status: SHIPPED | OUTPATIENT
Start: 2022-07-25 | End: 2022-10-17

## 2022-08-19 ENCOUNTER — TELEPHONE (OUTPATIENT)
Dept: INTERNAL MEDICINE CLINIC | Facility: CLINIC | Age: 87
End: 2022-08-19

## 2022-08-24 NOTE — TELEPHONE ENCOUNTER
Patient will benefit from long-term usage of Eliquis.  A need to add acid diuretics to his regimen for the time being to optimize his heart failure management as well   Please schedule virtual visit.

## 2022-08-27 DIAGNOSIS — I15.2 HYPERTENSION ASSOCIATED WITH DIABETES (HCC): ICD-10-CM

## 2022-08-27 DIAGNOSIS — E11.59 HYPERTENSION ASSOCIATED WITH DIABETES (HCC): ICD-10-CM

## 2022-08-27 DIAGNOSIS — E78.5 HYPERLIPIDEMIA DUE TO TYPE 2 DIABETES MELLITUS (HCC): ICD-10-CM

## 2022-08-27 DIAGNOSIS — E11.69 HYPERLIPIDEMIA DUE TO TYPE 2 DIABETES MELLITUS (HCC): ICD-10-CM

## 2022-08-27 DIAGNOSIS — I48.91 ATRIAL FIBRILLATION, UNSPECIFIED TYPE (HCC): ICD-10-CM

## 2022-08-27 DIAGNOSIS — E11.69 DIABETES MELLITUS TYPE 2 IN OBESE (HCC): ICD-10-CM

## 2022-08-27 DIAGNOSIS — E66.9 DIABETES MELLITUS TYPE 2 IN OBESE (HCC): ICD-10-CM

## 2022-08-27 DIAGNOSIS — E78.2 MIXED HYPERLIPIDEMIA: ICD-10-CM

## 2022-08-27 DIAGNOSIS — E11.9 DIABETES MELLITUS WITHOUT COMPLICATION (HCC): ICD-10-CM

## 2022-08-27 DIAGNOSIS — E11.42 CONTROLLED TYPE 2 DIABETES MELLITUS WITH DIABETIC POLYNEUROPATHY, WITHOUT LONG-TERM CURRENT USE OF INSULIN (HCC): ICD-10-CM

## 2022-08-27 RX ORDER — PRAVASTATIN SODIUM 40 MG
40 TABLET ORAL NIGHTLY
Qty: 90 TABLET | Refills: 3 | Status: CANCELLED | OUTPATIENT
Start: 2022-08-27

## 2022-08-28 DIAGNOSIS — Z00.00 ROUTINE GENERAL MEDICAL EXAMINATION AT A HEALTH CARE FACILITY: ICD-10-CM

## 2022-08-28 DIAGNOSIS — I10 ESSENTIAL HYPERTENSION: ICD-10-CM

## 2022-08-28 DIAGNOSIS — M48.062 SPINAL STENOSIS OF LUMBAR REGION WITH NEUROGENIC CLAUDICATION: ICD-10-CM

## 2022-08-28 DIAGNOSIS — I48.91 ATRIAL FIBRILLATION, UNSPECIFIED TYPE (HCC): ICD-10-CM

## 2022-08-28 DIAGNOSIS — E78.2 MIXED HYPERLIPIDEMIA: ICD-10-CM

## 2022-08-28 DIAGNOSIS — R26.89 NEED FOR ASSISTANCE DUE TO UNSTEADY GAIT: ICD-10-CM

## 2022-08-28 DIAGNOSIS — E03.9 HYPOTHYROIDISM, UNSPECIFIED TYPE: ICD-10-CM

## 2022-08-28 DIAGNOSIS — E11.9 CONTROLLED TYPE 2 DIABETES MELLITUS WITHOUT COMPLICATION, WITHOUT LONG-TERM CURRENT USE OF INSULIN (HCC): ICD-10-CM

## 2022-08-30 RX ORDER — SPIRONOLACTONE 25 MG/1
25 TABLET ORAL DAILY
Qty: 90 TABLET | Refills: 1 | Status: SHIPPED | OUTPATIENT
Start: 2022-08-30

## 2022-08-30 RX ORDER — GLIPIZIDE 5 MG/1
5 TABLET ORAL
Qty: 180 TABLET | Refills: 0 | Status: SHIPPED | OUTPATIENT
Start: 2022-08-30

## 2022-08-30 RX ORDER — LEVOTHYROXINE SODIUM 88 UG/1
88 TABLET ORAL
Qty: 90 TABLET | Refills: 0 | Status: SHIPPED | OUTPATIENT
Start: 2022-08-30 | End: 2022-11-22

## 2022-10-06 DIAGNOSIS — R39.14 BENIGN PROSTATIC HYPERPLASIA WITH INCOMPLETE BLADDER EMPTYING: ICD-10-CM

## 2022-10-06 DIAGNOSIS — N40.1 BENIGN PROSTATIC HYPERPLASIA WITH INCOMPLETE BLADDER EMPTYING: ICD-10-CM

## 2022-10-07 RX ORDER — TAMSULOSIN HYDROCHLORIDE 0.4 MG/1
0.4 CAPSULE ORAL DAILY
Qty: 90 CAPSULE | Refills: 3 | Status: SHIPPED | OUTPATIENT
Start: 2022-10-07 | End: 2023-10-25

## 2022-10-07 NOTE — TELEPHONE ENCOUNTER
No Protocol     Requesting: tamsulosin 0.4mg     LOV:5/4/22   # Uretrhal Stricture, BPH: well controlled on finasteride and flomax per urology.  rapaflo was too costly  RTC: 6 weeks   Filled: 6/29/22 #90 0 refills   Recent Labs: 3/29/22     Upcoming OV: 10/31/22

## 2022-10-17 DIAGNOSIS — E11.9 DIABETES MELLITUS WITHOUT COMPLICATION (HCC): ICD-10-CM

## 2022-10-18 RX ORDER — HYDROCHLOROTHIAZIDE 25 MG/1
25 TABLET ORAL DAILY
Qty: 90 TABLET | Refills: 0 | Status: SHIPPED | OUTPATIENT
Start: 2022-10-18 | End: 2023-01-26

## 2022-10-18 RX ORDER — OMEPRAZOLE 20 MG/1
20 CAPSULE, DELAYED RELEASE ORAL
Qty: 90 CAPSULE | Refills: 0 | Status: SHIPPED | OUTPATIENT
Start: 2022-10-18 | End: 2023-01-10

## 2022-10-26 ENCOUNTER — LAB ENCOUNTER (OUTPATIENT)
Dept: LAB | Age: 87
End: 2022-10-26
Attending: INTERNAL MEDICINE
Payer: MEDICARE

## 2022-10-26 DIAGNOSIS — E03.9 ACQUIRED HYPOTHYROIDISM: ICD-10-CM

## 2022-10-26 DIAGNOSIS — E55.9 VITAMIN D DEFICIENCY: ICD-10-CM

## 2022-10-26 DIAGNOSIS — I15.2 HYPERTENSION ASSOCIATED WITH DIABETES (HCC): ICD-10-CM

## 2022-10-26 DIAGNOSIS — E11.69 DIABETES MELLITUS TYPE 2 IN OBESE (HCC): ICD-10-CM

## 2022-10-26 DIAGNOSIS — E78.5 HYPERLIPIDEMIA DUE TO TYPE 2 DIABETES MELLITUS (HCC): ICD-10-CM

## 2022-10-26 DIAGNOSIS — E11.59 HYPERTENSION ASSOCIATED WITH DIABETES (HCC): ICD-10-CM

## 2022-10-26 DIAGNOSIS — E11.69 HYPERLIPIDEMIA DUE TO TYPE 2 DIABETES MELLITUS (HCC): ICD-10-CM

## 2022-10-26 DIAGNOSIS — E66.9 DIABETES MELLITUS TYPE 2 IN OBESE (HCC): ICD-10-CM

## 2022-10-26 DIAGNOSIS — D64.9 NORMOCYTIC ANEMIA: ICD-10-CM

## 2022-10-26 LAB
ALT SERPL-CCNC: 28 U/L
ANION GAP SERPL CALC-SCNC: 6 MMOL/L (ref 0–18)
AST SERPL-CCNC: 24 U/L (ref 15–37)
BASOPHILS # BLD AUTO: 0.05 X10(3) UL (ref 0–0.2)
BASOPHILS NFR BLD AUTO: 0.5 %
BUN BLD-MCNC: 28 MG/DL (ref 7–18)
CALCIUM BLD-MCNC: 10.2 MG/DL (ref 8.5–10.1)
CHLORIDE SERPL-SCNC: 107 MMOL/L (ref 98–112)
CO2 SERPL-SCNC: 26 MMOL/L (ref 21–32)
CREAT BLD-MCNC: 1.29 MG/DL
EOSINOPHIL # BLD AUTO: 0.18 X10(3) UL (ref 0–0.7)
EOSINOPHIL NFR BLD AUTO: 1.9 %
ERYTHROCYTE [DISTWIDTH] IN BLOOD BY AUTOMATED COUNT: 13 %
EST. AVERAGE GLUCOSE BLD GHB EST-MCNC: 143 MG/DL (ref 68–126)
FASTING STATUS PATIENT QL REPORTED: YES
GFR SERPLBLD BASED ON 1.73 SQ M-ARVRAT: 54 ML/MIN/1.73M2 (ref 60–?)
GLUCOSE BLD-MCNC: 91 MG/DL (ref 70–99)
HBA1C MFR BLD: 6.6 % (ref ?–5.7)
HCT VFR BLD AUTO: 34.6 %
HGB BLD-MCNC: 11.7 G/DL
IMM GRANULOCYTES # BLD AUTO: 0.03 X10(3) UL (ref 0–1)
IMM GRANULOCYTES NFR BLD: 0.3 %
LYMPHOCYTES # BLD AUTO: 1.55 X10(3) UL (ref 1–4)
LYMPHOCYTES NFR BLD AUTO: 16.3 %
MCH RBC QN AUTO: 32.3 PG (ref 26–34)
MCHC RBC AUTO-ENTMCNC: 33.8 G/DL (ref 31–37)
MCV RBC AUTO: 95.6 FL
MONOCYTES # BLD AUTO: 0.56 X10(3) UL (ref 0.1–1)
MONOCYTES NFR BLD AUTO: 5.9 %
NEUTROPHILS # BLD AUTO: 7.13 X10 (3) UL (ref 1.5–7.7)
NEUTROPHILS # BLD AUTO: 7.13 X10(3) UL (ref 1.5–7.7)
NEUTROPHILS NFR BLD AUTO: 75.1 %
OSMOLALITY SERPL CALC.SUM OF ELEC: 293 MOSM/KG (ref 275–295)
PLATELET # BLD AUTO: 207 10(3)UL (ref 150–450)
POTASSIUM SERPL-SCNC: 4.4 MMOL/L (ref 3.5–5.1)
RBC # BLD AUTO: 3.62 X10(6)UL
SODIUM SERPL-SCNC: 139 MMOL/L (ref 136–145)
T4 FREE SERPL-MCNC: 1.1 NG/DL (ref 0.8–1.7)
TSI SER-ACNC: 3.49 MIU/ML (ref 0.36–3.74)
VIT D+METAB SERPL-MCNC: 55.2 NG/ML (ref 30–100)
WBC # BLD AUTO: 9.5 X10(3) UL (ref 4–11)

## 2022-10-26 PROCEDURE — 85025 COMPLETE CBC W/AUTO DIFF WBC: CPT

## 2022-10-26 PROCEDURE — 84439 ASSAY OF FREE THYROXINE: CPT

## 2022-10-26 PROCEDURE — 82306 VITAMIN D 25 HYDROXY: CPT

## 2022-10-26 PROCEDURE — 83036 HEMOGLOBIN GLYCOSYLATED A1C: CPT

## 2022-10-26 PROCEDURE — 36415 COLL VENOUS BLD VENIPUNCTURE: CPT

## 2022-10-26 PROCEDURE — 84450 TRANSFERASE (AST) (SGOT): CPT

## 2022-10-26 PROCEDURE — 84443 ASSAY THYROID STIM HORMONE: CPT

## 2022-10-26 PROCEDURE — 80048 BASIC METABOLIC PNL TOTAL CA: CPT

## 2022-10-26 PROCEDURE — 84460 ALANINE AMINO (ALT) (SGPT): CPT

## 2022-10-31 ENCOUNTER — OFFICE VISIT (OUTPATIENT)
Dept: INTERNAL MEDICINE CLINIC | Facility: CLINIC | Age: 87
End: 2022-10-31
Payer: MEDICARE

## 2022-10-31 VITALS
TEMPERATURE: 98 F | HEART RATE: 79 BPM | DIASTOLIC BLOOD PRESSURE: 40 MMHG | OXYGEN SATURATION: 99 % | WEIGHT: 215.63 LBS | RESPIRATION RATE: 16 BRPM | SYSTOLIC BLOOD PRESSURE: 100 MMHG | HEIGHT: 69 IN | BODY MASS INDEX: 31.94 KG/M2

## 2022-10-31 DIAGNOSIS — E11.69 HYPERLIPIDEMIA DUE TO TYPE 2 DIABETES MELLITUS (HCC): ICD-10-CM

## 2022-10-31 DIAGNOSIS — I15.2 HYPERTENSION ASSOCIATED WITH DIABETES (HCC): ICD-10-CM

## 2022-10-31 DIAGNOSIS — E03.9 ACQUIRED HYPOTHYROIDISM: ICD-10-CM

## 2022-10-31 DIAGNOSIS — E11.59 HYPERTENSION ASSOCIATED WITH DIABETES (HCC): ICD-10-CM

## 2022-10-31 DIAGNOSIS — E55.9 VITAMIN D DEFICIENCY: ICD-10-CM

## 2022-10-31 DIAGNOSIS — G47.33 OSA (OBSTRUCTIVE SLEEP APNEA): ICD-10-CM

## 2022-10-31 DIAGNOSIS — E78.5 HYPERLIPIDEMIA DUE TO TYPE 2 DIABETES MELLITUS (HCC): ICD-10-CM

## 2022-10-31 DIAGNOSIS — Z00.00 ROUTINE GENERAL MEDICAL EXAMINATION AT A HEALTH CARE FACILITY: Primary | ICD-10-CM

## 2022-10-31 DIAGNOSIS — N40.1 BENIGN PROSTATIC HYPERPLASIA WITH INCOMPLETE BLADDER EMPTYING: ICD-10-CM

## 2022-10-31 DIAGNOSIS — E11.69 DIABETES MELLITUS TYPE 2 IN OBESE (HCC): ICD-10-CM

## 2022-10-31 DIAGNOSIS — M48.062 SPINAL STENOSIS OF LUMBAR REGION WITH NEUROGENIC CLAUDICATION: ICD-10-CM

## 2022-10-31 DIAGNOSIS — K21.00 GASTROESOPHAGEAL REFLUX DISEASE WITH ESOPHAGITIS WITHOUT HEMORRHAGE: ICD-10-CM

## 2022-10-31 DIAGNOSIS — Z13.31 SCREENING FOR DEPRESSION: ICD-10-CM

## 2022-10-31 DIAGNOSIS — R39.14 BENIGN PROSTATIC HYPERPLASIA WITH INCOMPLETE BLADDER EMPTYING: ICD-10-CM

## 2022-10-31 DIAGNOSIS — I48.0 PAROXYSMAL ATRIAL FIBRILLATION (HCC): ICD-10-CM

## 2022-10-31 DIAGNOSIS — E66.9 DIABETES MELLITUS TYPE 2 IN OBESE (HCC): ICD-10-CM

## 2022-10-31 PROBLEM — K59.00 CONSTIPATION: Status: RESOLVED | Noted: 2017-08-03 | Resolved: 2022-10-31

## 2022-10-31 PROCEDURE — 99214 OFFICE O/P EST MOD 30 MIN: CPT | Performed by: INTERNAL MEDICINE

## 2022-10-31 PROCEDURE — G0439 PPPS, SUBSEQ VISIT: HCPCS | Performed by: INTERNAL MEDICINE

## 2022-10-31 PROCEDURE — 1125F AMNT PAIN NOTED PAIN PRSNT: CPT | Performed by: INTERNAL MEDICINE

## 2022-10-31 PROCEDURE — G0444 DEPRESSION SCREEN ANNUAL: HCPCS | Performed by: INTERNAL MEDICINE

## 2022-10-31 RX ORDER — FLUTICASONE PROPIONATE 50 MCG
2 SPRAY, SUSPENSION (ML) NASAL DAILY
Qty: 16 G | Refills: 0 | Status: SHIPPED | OUTPATIENT
Start: 2022-10-31 | End: 2023-10-26

## 2022-10-31 NOTE — PATIENT INSTRUCTIONS
Please try to take losartan in evenings prior to bedtime. Please decrease your metformin to 1000 mg once daily. If your diarrhea and bowel movements do not normalize in 1-2 weeks then please stop the other metformin dose as well. Please try flonase nasal spray every day for your cough. If it is not helping your cough in 2 weeks, we will check a breathing test. Flonase can dry your nose out so you can use nasal saline spray to keep the nasal passageways clear. Please complete your labs and urine testing in April, 2023.

## 2022-11-01 RX ORDER — FLUTICASONE PROPIONATE 50 MCG
SPRAY, SUSPENSION (ML) NASAL
Qty: 48 G | Refills: 0 | OUTPATIENT
Start: 2022-11-01

## 2022-11-04 DIAGNOSIS — R39.14 BENIGN PROSTATIC HYPERPLASIA WITH INCOMPLETE BLADDER EMPTYING: ICD-10-CM

## 2022-11-04 DIAGNOSIS — N40.1 BENIGN PROSTATIC HYPERPLASIA WITH INCOMPLETE BLADDER EMPTYING: ICD-10-CM

## 2022-11-05 RX ORDER — FINASTERIDE 5 MG/1
TABLET, FILM COATED ORAL
Qty: 90 TABLET | Refills: 0 | OUTPATIENT
Start: 2022-11-05

## 2022-11-05 RX ORDER — FINASTERIDE 5 MG/1
5 TABLET, FILM COATED ORAL DAILY
Qty: 90 TABLET | Refills: 3 | Status: SHIPPED | OUTPATIENT
Start: 2022-11-05

## 2022-11-05 NOTE — TELEPHONE ENCOUNTER
No Protocol     Requesting: finasteride 5mg     LOV: 10/31/22   # Uretrhal Stricture, BPH: well controlled on finasteride and flomax per urology. rapaflo was too costly.    RTC: 1 yr   Filled: 10/11/21 #90 3 refills   Recent Labs: 10/26/22     Upcoming OV: none scheduled

## 2022-11-10 NOTE — TELEPHONE ENCOUNTER
Protocol passed     Requesting: losartan 100mg     LOV: 10/31/22   RTC: 1 yr   Filled: 4/26/22 #90 2 refills   Recent Labs: 10/26/22      Upcoming OV: none scheduled

## 2022-11-11 RX ORDER — LOSARTAN POTASSIUM 100 MG/1
100 TABLET ORAL DAILY
Qty: 90 TABLET | Refills: 2 | OUTPATIENT
Start: 2022-11-11

## 2022-11-11 RX ORDER — LOSARTAN POTASSIUM 100 MG/1
100 TABLET ORAL DAILY
Qty: 90 TABLET | Refills: 2 | Status: SHIPPED | OUTPATIENT
Start: 2022-11-11

## 2022-11-14 DIAGNOSIS — E11.42 CONTROLLED TYPE 2 DIABETES MELLITUS WITH DIABETIC POLYNEUROPATHY, WITHOUT LONG-TERM CURRENT USE OF INSULIN (HCC): ICD-10-CM

## 2022-11-14 DIAGNOSIS — I15.2 HYPERTENSION ASSOCIATED WITH DIABETES (HCC): ICD-10-CM

## 2022-11-14 DIAGNOSIS — E11.59 HYPERTENSION ASSOCIATED WITH DIABETES (HCC): ICD-10-CM

## 2022-11-14 DIAGNOSIS — E78.5 HYPERLIPIDEMIA DUE TO TYPE 2 DIABETES MELLITUS (HCC): ICD-10-CM

## 2022-11-14 DIAGNOSIS — E11.69 HYPERLIPIDEMIA DUE TO TYPE 2 DIABETES MELLITUS (HCC): ICD-10-CM

## 2022-11-14 DIAGNOSIS — I48.91 ATRIAL FIBRILLATION, UNSPECIFIED TYPE (HCC): ICD-10-CM

## 2022-11-14 DIAGNOSIS — E78.2 MIXED HYPERLIPIDEMIA: ICD-10-CM

## 2022-11-15 RX ORDER — PRAVASTATIN SODIUM 40 MG
TABLET ORAL
Qty: 90 TABLET | Refills: 3 | Status: SHIPPED | OUTPATIENT
Start: 2022-11-15

## 2022-11-26 DIAGNOSIS — E11.9 DIABETES MELLITUS WITHOUT COMPLICATION (HCC): ICD-10-CM

## 2022-11-28 RX ORDER — SPIRONOLACTONE 25 MG/1
25 TABLET ORAL DAILY
Qty: 90 TABLET | Refills: 1 | Status: SHIPPED | OUTPATIENT
Start: 2022-11-28

## 2022-12-02 ENCOUNTER — PATIENT MESSAGE (OUTPATIENT)
Dept: INTERNAL MEDICINE CLINIC | Facility: CLINIC | Age: 87
End: 2022-12-02

## 2022-12-02 NOTE — TELEPHONE ENCOUNTER
From: Ignacio Roman  To: Dave Candelaria MD  Sent: 12/2/2022 11:54 AM CST  Subject: Xarelto refill    I have an alternate affordable source for this medication and requested a 90 day refill with specific pharmacy details. the pharmacy also contacted you with no response. Please prepare a prescription for me to  at your office, notify me when it is available, and I will take it from there.

## 2022-12-05 ENCOUNTER — PATIENT MESSAGE (OUTPATIENT)
Dept: INTERNAL MEDICINE CLINIC | Facility: CLINIC | Age: 87
End: 2022-12-05

## 2022-12-05 NOTE — TELEPHONE ENCOUNTER
From: Latasha Roman  To: Koby Weinstein MD  Sent: 12/5/2022 9:23 AM CST  Subject: my chart issue    I requested a refill of Xarelto 20 mg. with specific pharmacy instructions (Pharmor) over a week ago. I heard nothing from the pharmacy or your office so I followed up with a my chart message Cynthys? reply,\"Dr Kimberlee Barragan did not prescribe the Xarelto. Direct refill requests to the prescribing doctor\", was only after I questioned the status. Bottom line due to the delay in responding will likely miss taking the medication for a few days because it comes from [place] [facility] I just thought you should be aware of the inefficiency and what problems it caused me.

## 2022-12-05 NOTE — TELEPHONE ENCOUNTER
I sent [facility] to patient. I did not place the order because I do not understand which pharmacy he wants us to use. Please clarify if he still wants to get refill through us.  TY

## 2022-12-07 ENCOUNTER — PATIENT MESSAGE (OUTPATIENT)
Dept: INTERNAL MEDICINE CLINIC | Facility: CLINIC | Age: 87
End: 2022-12-07

## 2022-12-08 NOTE — TELEPHONE ENCOUNTER
From: Shalini Roman  To: Igor Velazco MD  Sent: 12/7/2022 8:49 PM CST  Subject: Lesvia Seay    Following up on my last visit reducing metformin from 2000mg per day to 1000mg per day has helped my bowel movements. You indicated that we could eliminate it entirely. I would like to do that but my fasting blood sugar has averaged 176 for the last 6 days. I will appreciate your direction moving forward.

## 2023-01-13 RX ORDER — BLOOD SUGAR DIAGNOSTIC
STRIP MISCELLANEOUS
Qty: 100 STRIP | Refills: 3 | Status: SHIPPED | OUTPATIENT
Start: 2023-01-13

## 2023-01-17 ENCOUNTER — PATIENT MESSAGE (OUTPATIENT)
Dept: INTERNAL MEDICINE CLINIC | Facility: CLINIC | Age: 88
End: 2023-01-17

## 2023-01-17 NOTE — TELEPHONE ENCOUNTER
From: [facility]  To: Shala Dietrich MD  Sent: 1/17/2023 12:20 PM CST  Subject: HOME BLOOD TEST RESULTS    My readings for the last 7 days have been [phone]. I am taking 5mg of GLIPZIDE fasting before breakfast (8:00 AM) and before dinner.(6:00) I am also taking 10mg of JARDIANCE before dinner.  My main meal is lunch(1:00)

## 2023-01-20 RX ORDER — BLOOD SUGAR DIAGNOSTIC
STRIP MISCELLANEOUS
Qty: 100 STRIP | Refills: 3 | Status: CANCELLED | OUTPATIENT
Start: 2023-01-20

## 2023-01-20 RX ORDER — EMPAGLIFLOZIN 25 MG/1
25 TABLET, FILM COATED ORAL DAILY
Qty: 90 TABLET | Refills: 1 | Status: SHIPPED | OUTPATIENT
Start: 2023-01-20

## 2023-01-22 ENCOUNTER — PATIENT MESSAGE (OUTPATIENT)
Dept: INTERNAL MEDICINE CLINIC | Facility: CLINIC | Age: 88
End: 2023-01-22

## 2023-01-23 ENCOUNTER — PATIENT MESSAGE (OUTPATIENT)
Dept: INTERNAL MEDICINE CLINIC | Facility: CLINIC | Age: 88
End: 2023-01-23

## 2023-01-23 NOTE — TELEPHONE ENCOUNTER
Faxed completed form back to [address] [place] [address] at [phone]. Received confirmation, copy sent to scan.

## 2023-01-23 NOTE — TELEPHONE ENCOUNTER
Pt called being extremely agressive towards staff in regards to test strips. Informed him an urgent message has been sent. Pt began to repeat himself and frustration. I informed him once again a message has been sent urgently to take care of the problem. Pt then said I am not taking care of his problem and I am not 'worth the 10 minute hold time'     Please advise on issue of test strips.

## 2023-01-23 NOTE — TELEPHONE ENCOUNTER
Patient called to follow up on status. Informed we haven't received fax from pharmacy and I would call pharmacy to follow up. I got off of the phone call and Spencer Zendejas let me know the fax had just come through. Gave fax to [address] [place] [address]. Called patient back and informed him that we had just received the fax and it was given to our clinical staff to process. Patient would like a call back to let him know once fax has been sent to pharmacy.

## 2023-01-24 NOTE — TELEPHONE ENCOUNTER
From: Jessica Roman  To: Vaishnavi Locke MD  Sent: 1/23/2023 4:00 PM CST  Subject: Frustration With [facility]    I am sending this to make you aware of the inefficiencies I experienced trying to get my test strips refilled. I requested a refill on Friday1/13/02 when I had 4 test strips in hand. Normally I get follow-up from the drugstore and My Chart. I contacted [facility]\"they did not receive it\". Another My chart request \"denied. [facility] sent you a fax form to complete \"you did not recieve it\". Asked [facility] to call and talk to you. Don't know if that happened but there was no positive result. Waited 17 minutes to talk to [facility] this AM. It was time wasted. Bottom line 10 days multiple phone calls ( all with how important my call was message lasting at least 9 minutes) and My chart request .  I have not been able to test for 6 days. Finally talked to Parag Prado today she found the fax and was giving it to sign and will call me when this sad tale comes to an end            esult.

## 2023-01-26 RX ORDER — HYDROCHLOROTHIAZIDE 25 MG/1
TABLET ORAL
Qty: 90 TABLET | Refills: 3 | Status: SHIPPED | OUTPATIENT
Start: 2023-01-26

## 2023-01-27 ENCOUNTER — TELEPHONE (OUTPATIENT)
Dept: INTERNAL MEDICINE CLINIC | Facility: CLINIC | Age: 88
End: 2023-01-27

## 2023-01-27 NOTE — TELEPHONE ENCOUNTER
KS - pt has only been on Jardiance one week. I sent [facility], but wanted to make sure you are aware in case you have more recommendations for this pt. TY!    [phone] Dusty - stated in my message that I will send [facility]. Sent pt MCM.

## 2023-01-27 NOTE — TELEPHONE ENCOUNTER
Pt called stating his blood sugar has been high. He states they are in the process of switching him from Metformin to Elgie Peach. Blood sugar was 225 today.     Pt requesting to speak with a nurse

## 2023-01-27 NOTE — TELEPHONE ENCOUNTER
Agree with nurse. We just increased his jardiance dose. Need to give new dose 14 days and we will re-assess his blood sugars.

## 2023-02-14 ENCOUNTER — TELEPHONE (OUTPATIENT)
Dept: INTERNAL MEDICINE CLINIC | Facility: CLINIC | Age: 88
End: 2023-02-14

## 2023-02-14 NOTE — TELEPHONE ENCOUNTER
Incoming physician order via fax from [employer]. Order placed in KS in basket for review and completion.

## 2023-03-01 DIAGNOSIS — E11.69 DIABETES MELLITUS TYPE 2 IN OBESE (HCC): ICD-10-CM

## 2023-03-01 DIAGNOSIS — E66.9 DIABETES MELLITUS TYPE 2 IN OBESE (HCC): ICD-10-CM

## 2023-03-01 DIAGNOSIS — E11.9 DIABETES MELLITUS WITHOUT COMPLICATION (HCC): ICD-10-CM

## 2023-03-01 NOTE — TELEPHONE ENCOUNTER
Spironolactone 25 mg  Filled 11-28-22  Qty 90  0 refills  No upcoming appt.   LOV 10-31-22 KS  RTC 1 yr  Labs 10-26-22 AST/ALT/BMP

## 2023-03-02 RX ORDER — SPIRONOLACTONE 25 MG/1
TABLET ORAL
Qty: 90 TABLET | Refills: 1 | Status: SHIPPED | OUTPATIENT
Start: 2023-03-02

## 2023-03-02 RX ORDER — GLIPIZIDE 5 MG/1
5 TABLET ORAL
Qty: 180 TABLET | Refills: 3 | Status: SHIPPED | OUTPATIENT
Start: 2023-03-02

## 2023-03-02 NOTE — TELEPHONE ENCOUNTER
Glipizide 5 mg- Too early for refill  Filled 12-7-22  Qty 180  3 refills  No upcoming appt.   LOV 10-31-22 KS  RTC 1 yr  Labs 10-26-22 A1c

## 2023-03-13 ENCOUNTER — TELEPHONE (OUTPATIENT)
Dept: INTERNAL MEDICINE CLINIC | Facility: CLINIC | Age: 88
End: 2023-03-13

## 2023-03-13 NOTE — TELEPHONE ENCOUNTER
Incoming fax from [place] [employer] order for Diabetic testing supplies. Placed in Dr Narayan Singh in basket for her to complete and review.

## 2023-03-31 ENCOUNTER — LAB ENCOUNTER (OUTPATIENT)
Dept: LAB | Age: 88
End: 2023-03-31
Attending: INTERNAL MEDICINE
Payer: MEDICARE

## 2023-03-31 DIAGNOSIS — E11.69 DIABETES MELLITUS TYPE 2 IN OBESE (HCC): ICD-10-CM

## 2023-03-31 DIAGNOSIS — E55.9 VITAMIN D DEFICIENCY: ICD-10-CM

## 2023-03-31 DIAGNOSIS — Z00.00 ROUTINE GENERAL MEDICAL EXAMINATION AT A HEALTH CARE FACILITY: ICD-10-CM

## 2023-03-31 DIAGNOSIS — E11.59 HYPERTENSION ASSOCIATED WITH DIABETES (HCC): ICD-10-CM

## 2023-03-31 DIAGNOSIS — G47.33 OSA (OBSTRUCTIVE SLEEP APNEA): ICD-10-CM

## 2023-03-31 DIAGNOSIS — R39.14 BENIGN PROSTATIC HYPERPLASIA WITH INCOMPLETE BLADDER EMPTYING: ICD-10-CM

## 2023-03-31 DIAGNOSIS — E03.9 ACQUIRED HYPOTHYROIDISM: ICD-10-CM

## 2023-03-31 DIAGNOSIS — E78.5 HYPERLIPIDEMIA DUE TO TYPE 2 DIABETES MELLITUS (HCC): ICD-10-CM

## 2023-03-31 DIAGNOSIS — M48.062 SPINAL STENOSIS OF LUMBAR REGION WITH NEUROGENIC CLAUDICATION: ICD-10-CM

## 2023-03-31 DIAGNOSIS — K21.00 GASTROESOPHAGEAL REFLUX DISEASE WITH ESOPHAGITIS WITHOUT HEMORRHAGE: ICD-10-CM

## 2023-03-31 DIAGNOSIS — Z13.31 SCREENING FOR DEPRESSION: ICD-10-CM

## 2023-03-31 DIAGNOSIS — N40.1 BENIGN PROSTATIC HYPERPLASIA WITH INCOMPLETE BLADDER EMPTYING: ICD-10-CM

## 2023-03-31 DIAGNOSIS — I15.2 HYPERTENSION ASSOCIATED WITH DIABETES (HCC): ICD-10-CM

## 2023-03-31 DIAGNOSIS — E11.69 HYPERLIPIDEMIA DUE TO TYPE 2 DIABETES MELLITUS (HCC): ICD-10-CM

## 2023-03-31 DIAGNOSIS — E66.9 DIABETES MELLITUS TYPE 2 IN OBESE (HCC): ICD-10-CM

## 2023-03-31 DIAGNOSIS — I48.0 PAROXYSMAL ATRIAL FIBRILLATION (HCC): ICD-10-CM

## 2023-03-31 LAB
ALT SERPL-CCNC: 37 U/L
ANION GAP SERPL CALC-SCNC: 6 MMOL/L (ref 0–18)
AST SERPL-CCNC: 25 U/L (ref 15–37)
BASOPHILS # BLD AUTO: 0.07 X10(3) UL (ref 0–0.2)
BASOPHILS NFR BLD AUTO: 0.8 %
BUN BLD-MCNC: 36 MG/DL (ref 7–18)
CALCIUM BLD-MCNC: 9.9 MG/DL (ref 8.5–10.1)
CHLORIDE SERPL-SCNC: 107 MMOL/L (ref 98–112)
CHOLEST SERPL-MCNC: 157 MG/DL (ref ?–200)
CO2 SERPL-SCNC: 25 MMOL/L (ref 21–32)
CREAT BLD-MCNC: 1.56 MG/DL
CREAT UR-SCNC: 94.5 MG/DL
EOSINOPHIL # BLD AUTO: 0.3 X10(3) UL (ref 0–0.7)
EOSINOPHIL NFR BLD AUTO: 3.3 %
ERYTHROCYTE [DISTWIDTH] IN BLOOD BY AUTOMATED COUNT: 13.3 %
EST. AVERAGE GLUCOSE BLD GHB EST-MCNC: 183 MG/DL (ref 68–126)
FASTING PATIENT LIPID ANSWER: YES
FASTING STATUS PATIENT QL REPORTED: YES
GFR SERPLBLD BASED ON 1.73 SQ M-ARVRAT: 42 ML/MIN/1.73M2 (ref 60–?)
GLUCOSE BLD-MCNC: 177 MG/DL (ref 70–99)
HBA1C MFR BLD: 8 % (ref ?–5.7)
HCT VFR BLD AUTO: 40.1 %
HDLC SERPL-MCNC: 41 MG/DL (ref 40–59)
HGB BLD-MCNC: 13.1 G/DL
IMM GRANULOCYTES # BLD AUTO: 0.04 X10(3) UL (ref 0–1)
IMM GRANULOCYTES NFR BLD: 0.4 %
LDLC SERPL CALC-MCNC: 80 MG/DL (ref ?–100)
LYMPHOCYTES # BLD AUTO: 1.57 X10(3) UL (ref 1–4)
LYMPHOCYTES NFR BLD AUTO: 17.4 %
MCH RBC QN AUTO: 31.3 PG (ref 26–34)
MCHC RBC AUTO-ENTMCNC: 32.7 G/DL (ref 31–37)
MCV RBC AUTO: 95.9 FL
MICROALBUMIN UR-MCNC: 0.73 MG/DL
MICROALBUMIN/CREAT 24H UR-RTO: 7.7 UG/MG (ref ?–30)
MONOCYTES # BLD AUTO: 0.78 X10(3) UL (ref 0.1–1)
MONOCYTES NFR BLD AUTO: 8.6 %
NEUTROPHILS # BLD AUTO: 6.27 X10 (3) UL (ref 1.5–7.7)
NEUTROPHILS # BLD AUTO: 6.27 X10(3) UL (ref 1.5–7.7)
NEUTROPHILS NFR BLD AUTO: 69.5 %
NONHDLC SERPL-MCNC: 116 MG/DL (ref ?–130)
OSMOLALITY SERPL CALC.SUM OF ELEC: 299 MOSM/KG (ref 275–295)
PLATELET # BLD AUTO: 251 10(3)UL (ref 150–450)
POTASSIUM SERPL-SCNC: 3.9 MMOL/L (ref 3.5–5.1)
RBC # BLD AUTO: 4.18 X10(6)UL
SODIUM SERPL-SCNC: 138 MMOL/L (ref 136–145)
TRIGL SERPL-MCNC: 215 MG/DL (ref 30–149)
VLDLC SERPL CALC-MCNC: 34 MG/DL (ref 0–30)
WBC # BLD AUTO: 9 X10(3) UL (ref 4–11)

## 2023-03-31 PROCEDURE — 80048 BASIC METABOLIC PNL TOTAL CA: CPT

## 2023-03-31 PROCEDURE — 85025 COMPLETE CBC W/AUTO DIFF WBC: CPT

## 2023-03-31 PROCEDURE — 83036 HEMOGLOBIN GLYCOSYLATED A1C: CPT

## 2023-03-31 PROCEDURE — 84450 TRANSFERASE (AST) (SGOT): CPT

## 2023-03-31 PROCEDURE — 84460 ALANINE AMINO (ALT) (SGPT): CPT

## 2023-03-31 PROCEDURE — 80061 LIPID PANEL: CPT

## 2023-03-31 PROCEDURE — 82043 UR ALBUMIN QUANTITATIVE: CPT

## 2023-03-31 PROCEDURE — 82570 ASSAY OF URINE CREATININE: CPT

## 2023-03-31 PROCEDURE — 36415 COLL VENOUS BLD VENIPUNCTURE: CPT

## 2023-04-03 ENCOUNTER — TELEPHONE (OUTPATIENT)
Dept: INTERNAL MEDICINE CLINIC | Facility: CLINIC | Age: 88
End: 2023-04-03

## 2023-04-14 ENCOUNTER — OFFICE VISIT (OUTPATIENT)
Dept: INTERNAL MEDICINE CLINIC | Facility: CLINIC | Age: 88
End: 2023-04-14
Payer: MEDICARE

## 2023-04-14 VITALS
WEIGHT: 213 LBS | HEIGHT: 69 IN | DIASTOLIC BLOOD PRESSURE: 60 MMHG | HEART RATE: 71 BPM | RESPIRATION RATE: 16 BRPM | OXYGEN SATURATION: 99 % | TEMPERATURE: 98 F | BODY MASS INDEX: 31.55 KG/M2 | SYSTOLIC BLOOD PRESSURE: 136 MMHG

## 2023-04-14 DIAGNOSIS — I15.2 HYPERTENSION ASSOCIATED WITH DIABETES (HCC): ICD-10-CM

## 2023-04-14 DIAGNOSIS — E11.69 DIABETES MELLITUS TYPE 2 IN OBESE (HCC): ICD-10-CM

## 2023-04-14 DIAGNOSIS — E11.69 HYPERLIPIDEMIA DUE TO TYPE 2 DIABETES MELLITUS (HCC): Primary | ICD-10-CM

## 2023-04-14 DIAGNOSIS — E66.9 DIABETES MELLITUS TYPE 2 IN OBESE (HCC): ICD-10-CM

## 2023-04-14 DIAGNOSIS — E11.59 HYPERTENSION ASSOCIATED WITH DIABETES (HCC): ICD-10-CM

## 2023-04-14 DIAGNOSIS — E03.9 ACQUIRED HYPOTHYROIDISM: ICD-10-CM

## 2023-04-14 DIAGNOSIS — I48.0 PAROXYSMAL ATRIAL FIBRILLATION (HCC): ICD-10-CM

## 2023-04-14 DIAGNOSIS — E78.5 HYPERLIPIDEMIA DUE TO TYPE 2 DIABETES MELLITUS (HCC): Primary | ICD-10-CM

## 2023-04-14 PROBLEM — E11.9 DIABETES MELLITUS WITHOUT COMPLICATION (HCC): Status: ACTIVE | Noted: 2022-12-27

## 2023-04-14 PROBLEM — G47.33 OSA ON CPAP: Status: ACTIVE | Noted: 2022-12-27

## 2023-04-14 PROBLEM — D64.9 NORMOCYTIC ANEMIA: Status: RESOLVED | Noted: 2021-08-03 | Resolved: 2023-04-14

## 2023-04-14 PROBLEM — E11.9 DIABETES MELLITUS WITHOUT COMPLICATION (HCC): Status: RESOLVED | Noted: 2022-12-27 | Resolved: 2023-04-14

## 2023-04-14 PROBLEM — Z99.89 OSA ON CPAP: Status: ACTIVE | Noted: 2022-12-27

## 2023-04-14 PROBLEM — D68.69 OTHER THROMBOPHILIA (HCC): Status: ACTIVE | Noted: 2023-04-14

## 2023-04-14 PROCEDURE — 99215 OFFICE O/P EST HI 40 MIN: CPT | Performed by: INTERNAL MEDICINE

## 2023-04-14 RX ORDER — EMPAGLIFLOZIN 25 MG/1
25 TABLET, FILM COATED ORAL DAILY
Qty: 90 TABLET | Refills: 3 | Status: SHIPPED | OUTPATIENT
Start: 2023-04-14

## 2023-04-14 NOTE — PATIENT INSTRUCTIONS
Please ask Dr. Wilfredo Lopez if an LDL of 80 is okay or if he wants me to increase pravastatin dose to 80 mg to achieve LDL less than 70 (as you are a diabetic). Please check your A1C test in July. You do not need to fast.    You will be due for your routine set of labs in October, 1 week prior to your wellness exam.     I do advise you get the TDAP (tetanus, diptheriae, pertussis) vaccine every 10 years. I advise you get the annual flu shot every September, October.

## 2023-04-26 ENCOUNTER — PATIENT MESSAGE (OUTPATIENT)
Dept: INTERNAL MEDICINE CLINIC | Facility: CLINIC | Age: 88
End: 2023-04-26

## 2023-04-26 NOTE — TELEPHONE ENCOUNTER
From: Larae Duverney Bonomo  To: Adrienne Barker MD  Sent: 4/26/2023 12:29 PM CDT  Subject: Blood Sugar Follow-up    Dr Bienvenido Randle  My daily sugars are no longer trending favorably. I am taking 7.5 mg of glipizide in the AM :30 prior to breakfast - 25mg Jardiance with breakfast - 5mg Glipizide :30 prior to dinner.  Lunch is my main meal. 4/15 --4/17AM 186--4/18 --8/19   4/20 --4/21 --8/23  --  4/24  --4/14PF639--9/26   4/29 --4/*30

## 2023-04-27 ENCOUNTER — PATIENT MESSAGE (OUTPATIENT)
Dept: INTERNAL MEDICINE CLINIC | Facility: CLINIC | Age: 88
End: 2023-04-27

## 2023-04-27 DIAGNOSIS — E11.65 UNCONTROLLED TYPE 2 DIABETES MELLITUS WITH HYPERGLYCEMIA (HCC): Primary | ICD-10-CM

## 2023-04-27 RX ORDER — DULAGLUTIDE 0.75 MG/.5ML
0.75 INJECTION, SOLUTION SUBCUTANEOUS WEEKLY
Qty: 0.5 ML | Refills: 1 | Status: SHIPPED | OUTPATIENT
Start: 2023-04-27

## 2023-04-27 NOTE — TELEPHONE ENCOUNTER
KS - would you like to fit pt in somewhere sooner? If not, that is ok, but pt would like the printed Rx today, if possible. Please advise, ty! RN received urgent triage call from pt, he is very anxious about his blood sugars and this is a very big decision for him. RN scheduled pt for soonest opening for [facility] -  Future Appointments   Date Time Provider Pam Mandujano   5/3/2023  2:45 PM Alanna Souza MD [facility] 8 [facility] [place]     15-minute time slot. Pt is leaning towards Trulicity, but feels the only way he will be able to afford this is to obtain a hard copy of the Rx and get the product through [place] If [place] is agreeable, he would like to  the printed Rx today or tomorrow. RN explained there may also be  coupons available to assist him. Pt will look into this between now and appt 5/3. Pt states he is quite stressed to see BS in the 220-250 range, pt also said he had over 300 twice in 3 week time frame. Pt would like to know if he should do anything different this week until appt?

## 2023-04-27 NOTE — TELEPHONE ENCOUNTER
RN received an incoming call from [employer] asking why pt is being prescribed Trulicity. Informed her it is for pt's DM2 management. Magali v/u and will document in pt's record.

## 2023-04-27 NOTE — TELEPHONE ENCOUNTER
From: Larae Duverney Bonomo  To: Adrienne Barker MD  Sent: 4/27/2023 3:44 PM CDT  Subject: BLOOD SUGAR UPDATE    blood sugar update as o 4/27 atf3:15  Understanding that I have 2 choices moving forward I have decided on the Trulicity you have recommended. You are right, it is pricey but manageable with my BC-BS plan. You may hear from them for some sort of approval. Marcel Laureano was very helpful today and I will  the prescription tomorrow. Please schedule the training session as soon as posable. I will continue with the glipizide and Jardiance schedule   until we switch over. Do you want me to continue testing? Will I be using the same test strips with Trulicity? Please thank Marcel Laureano for calming me down today.

## 2023-04-27 NOTE — TELEPHONE ENCOUNTER
Order for trulicity entered. Please print for patient. I understand his anxiety. It will take time (several weeks) for us to slowly get his blood sugars under control. But this is not life threatening. Once he gets his trulcity filled we will have him establish with diabetes nurse educator to learn how to inject himself.

## 2023-04-28 NOTE — TELEPHONE ENCOUNTER
Pt came into the office to  the paper prescription for his Trulicity. Pt stated Osman Aguirre told him he might be able to get authorization for 90-day supplies of the Trulicity, which is what he is hoping for. Pt was also given the scheduling phone number for the Diabetic educators to schedule his one-hour teaching. Pt will try to schedule asap for the teaching. Pt will be getting more BS testing strips, he just wanted to make sure the testing equipment was not going to be different, pt said he already has that prescription for strips. Pt denied any further needs at this time. Encouraged pt to call in the future if he needs further assistance.

## 2023-05-01 ENCOUNTER — TELEPHONE (OUTPATIENT)
Dept: INTERNAL MEDICINE CLINIC | Facility: CLINIC | Age: 88
End: 2023-05-01

## 2023-05-01 NOTE — TELEPHONE ENCOUNTER
Yes, I have reviewed everything with patient several times. He needs to be patient as he will be seeing DM educator tomorrow and me on 5/3. It is getting too difficult to keep talking over the phone. Trulicity is being ADDED to current medication regimen.

## 2023-05-01 NOTE — TELEPHONE ENCOUNTER
Incoming fax from [address] [place] [address] has been approved for Step Therapy 01/28/2023-04/28/2024.

## 2023-05-01 NOTE — TELEPHONE ENCOUNTER
[phone] left detailed message on pt's identified vm stating he is not stopping any medications, Trulicity is being added on to his current regimen. Also stated in message a [facility] will also be sent. Left cb# if has further questions. [facility] sent.

## 2023-05-01 NOTE — TELEPHONE ENCOUNTER
Spoke to pt, confirmed Trulicity is in addition to current regimen with glipizide and jardiance. Pt v/u. Pt does not want to do his BG finger sticks more than 2 times per day, his fingers look like pin cushions. RN suggested pt ask [place] tomorrow about CGM. Pt very worried about the cost.     Reminded pt that he has appt with Dr. Cliff Bowers 5/3/23 and they can discuss everything further at that appt. Pt v/u.

## 2023-05-01 NOTE — TELEPHONE ENCOUNTER
Patient called in requesting a call back. Pt would like to know if he has to stop any medication since he will start taking Trulicity. Please advise.

## 2023-05-01 NOTE — TELEPHONE ENCOUNTER
KS - please advise! TY! Call received from pt. He is still confused. He initially thought the Trulicity was going to take the place of one of his other medications. Pt states BCBS originally told him the Trulicity was going to cost $9000/year, pt has gotten them to agree to $1000/year. Pt states he needs a firm plan; he needs to confirm the following:    Continue taking -   1-1/2 tabs of Glipizide before BF  Take Jardiance 30 minutes after BF Glipizide  1 tab of Glipizide at dinner    AND ADD TRULICITY ? Pt also wants to confirm how often he is supposed to be taking his blood sugars?

## 2023-05-02 ENCOUNTER — NURSE ONLY (OUTPATIENT)
Dept: ENDOCRINOLOGY CLINIC | Facility: CLINIC | Age: 88
End: 2023-05-02
Payer: MEDICARE

## 2023-05-02 VITALS — WEIGHT: 201.38 LBS | BODY MASS INDEX: 30 KG/M2

## 2023-05-02 DIAGNOSIS — E11.65 UNCONTROLLED TYPE 2 DIABETES MELLITUS WITH HYPERGLYCEMIA (HCC): ICD-10-CM

## 2023-05-02 PROCEDURE — G0108 DIAB MANAGE TRN  PER INDIV: HCPCS | Performed by: DIETITIAN, REGISTERED

## 2023-05-03 ENCOUNTER — TELEMEDICINE (OUTPATIENT)
Dept: INTERNAL MEDICINE CLINIC | Facility: CLINIC | Age: 88
End: 2023-05-03
Payer: MEDICARE

## 2023-05-03 DIAGNOSIS — E11.65 UNCONTROLLED TYPE 2 DIABETES MELLITUS WITH HYPERGLYCEMIA (HCC): Primary | ICD-10-CM

## 2023-05-05 ENCOUNTER — TELEPHONE (OUTPATIENT)
Dept: INTERNAL MEDICINE CLINIC | Facility: CLINIC | Age: 88
End: 2023-05-05

## 2023-05-05 ENCOUNTER — APPOINTMENT (OUTPATIENT)
Dept: GENERAL RADIOLOGY | Age: 88
End: 2023-05-05
Attending: EMERGENCY MEDICINE
Payer: MEDICARE

## 2023-05-05 ENCOUNTER — HOSPITAL ENCOUNTER (EMERGENCY)
Age: 88
Discharge: HOME | End: 2023-05-05
Attending: EMERGENCY MEDICINE
Payer: MEDICARE

## 2023-05-05 ENCOUNTER — PATIENT MESSAGE (OUTPATIENT)
Dept: INTERNAL MEDICINE CLINIC | Facility: CLINIC | Age: 88
End: 2023-05-05

## 2023-05-05 VITALS
OXYGEN SATURATION: 99 % | WEIGHT: 201 LBS | HEART RATE: 68 BPM | SYSTOLIC BLOOD PRESSURE: 110 MMHG | BODY MASS INDEX: 29.77 KG/M2 | RESPIRATION RATE: 18 BRPM | TEMPERATURE: 98 F | DIASTOLIC BLOOD PRESSURE: 68 MMHG | HEIGHT: 69 IN

## 2023-05-05 DIAGNOSIS — R19.7 DIARRHEA, UNSPECIFIED TYPE: Primary | ICD-10-CM

## 2023-05-05 DIAGNOSIS — R10.13 DYSPEPSIA: ICD-10-CM

## 2023-05-05 LAB
ADENOVIRUS F 40/41 PCR: NEGATIVE
ALBUMIN SERPL-MCNC: 3.7 G/DL (ref 3.4–5)
ALBUMIN/GLOB SERPL: 0.9 {RATIO} (ref 1–2)
ALP LIVER SERPL-CCNC: 90 U/L
ALT SERPL-CCNC: 32 U/L
ANION GAP SERPL CALC-SCNC: 6 MMOL/L (ref 0–18)
AST SERPL-CCNC: 28 U/L (ref 15–37)
ASTROVIRUS PCR: NEGATIVE
ATRIAL RATE: 82 BPM
BASOPHILS # BLD AUTO: 0.06 X10(3) UL (ref 0–0.2)
BASOPHILS NFR BLD AUTO: 0.6 %
BILIRUB SERPL-MCNC: 0.9 MG/DL (ref 0.1–2)
BILIRUB UR QL STRIP.AUTO: NEGATIVE
BUN BLD-MCNC: 38 MG/DL (ref 7–18)
C CAYETANENSIS DNA SPEC QL NAA+PROBE: NEGATIVE
CALCIUM BLD-MCNC: 11.1 MG/DL (ref 8.5–10.1)
CAMPY SP DNA.DIARRHEA STL QL NAA+PROBE: NEGATIVE
CHLORIDE SERPL-SCNC: 101 MMOL/L (ref 98–112)
CLARITY UR REFRACT.AUTO: CLEAR
CO2 SERPL-SCNC: 26 MMOL/L (ref 21–32)
COLOR UR AUTO: YELLOW
CREAT BLD-MCNC: 1.56 MG/DL
CRYPTOSP DNA SPEC QL NAA+PROBE: NEGATIVE
EAEC PAA PLAS AGGR+AATA ST NAA+NON-PRB: NEGATIVE
EC STX1+STX2 + H7 FLIC SPEC NAA+PROBE: NEGATIVE
ENTAMOEBA HISTOLYTICA PCR: NEGATIVE
EOSINOPHIL # BLD AUTO: 0.53 X10(3) UL (ref 0–0.7)
EOSINOPHIL NFR BLD AUTO: 5 %
EPEC EAE GENE STL QL NAA+NON-PROBE: NEGATIVE
ERYTHROCYTE [DISTWIDTH] IN BLOOD BY AUTOMATED COUNT: 13.4 %
ETEC LTA+ST1A+ST1B TOX ST NAA+NON-PROBE: NEGATIVE
GFR SERPLBLD BASED ON 1.73 SQ M-ARVRAT: 42 ML/MIN/1.73M2 (ref 60–?)
GIARDIA LAMBLIA PCR: NEGATIVE
GLOBULIN PLAS-MCNC: 4 G/DL (ref 2.8–4.4)
GLUCOSE BLD-MCNC: 165 MG/DL (ref 70–99)
GLUCOSE BLD-MCNC: 183 MG/DL (ref 70–99)
GLUCOSE UR STRIP.AUTO-MCNC: 500 MG/DL
HCT VFR BLD AUTO: 43.4 %
HGB BLD-MCNC: 14.4 G/DL
IMM GRANULOCYTES # BLD AUTO: 0.02 X10(3) UL (ref 0–1)
IMM GRANULOCYTES NFR BLD: 0.2 %
KETONES UR STRIP.AUTO-MCNC: NEGATIVE MG/DL
LEUKOCYTE ESTERASE UR QL STRIP.AUTO: NEGATIVE
LYMPHOCYTES # BLD AUTO: 1.08 X10(3) UL (ref 1–4)
LYMPHOCYTES NFR BLD AUTO: 10.2 %
MAGNESIUM SERPL-MCNC: 1.9 MG/DL (ref 1.6–2.6)
MCH RBC QN AUTO: 30.8 PG (ref 26–34)
MCHC RBC AUTO-ENTMCNC: 33.2 G/DL (ref 31–37)
MCV RBC AUTO: 92.7 FL
MONOCYTES # BLD AUTO: 0.85 X10(3) UL (ref 0.1–1)
MONOCYTES NFR BLD AUTO: 8 %
NEUTROPHILS # BLD AUTO: 8.1 X10 (3) UL (ref 1.5–7.7)
NEUTROPHILS # BLD AUTO: 8.1 X10(3) UL (ref 1.5–7.7)
NEUTROPHILS NFR BLD AUTO: 76 %
NITRITE UR QL STRIP.AUTO: NEGATIVE
NOROVIRUS GI/GII PCR: NEGATIVE
OSMOLALITY SERPL CALC.SUM OF ELEC: 290 MOSM/KG (ref 275–295)
P SHIGELLOIDES DNA STL QL NAA+PROBE: NEGATIVE
PH UR STRIP.AUTO: 5 [PH] (ref 5–8)
PLATELET # BLD AUTO: 297 10(3)UL (ref 150–450)
POTASSIUM SERPL-SCNC: 4 MMOL/L (ref 3.5–5.1)
PROT SERPL-MCNC: 7.7 G/DL (ref 6.4–8.2)
PROT UR STRIP.AUTO-MCNC: NEGATIVE MG/DL
Q-T INTERVAL: 376 MS
QRS DURATION: 86 MS
QTC CALCULATION (BEZET): 428 MS
R AXIS: -56 DEGREES
RBC # BLD AUTO: 4.68 X10(6)UL
RBC UR QL AUTO: NEGATIVE
ROTAVIRUS A PCR: NEGATIVE
SALMONELLA DNA SPEC QL NAA+PROBE: NEGATIVE
SAPOVIRUS PCR: NEGATIVE
SHIGELLA SP+EIEC IPAH ST NAA+NON-PROBE: NEGATIVE
SODIUM SERPL-SCNC: 133 MMOL/L (ref 136–145)
SP GR UR STRIP.AUTO: 1.01 (ref 1–1.03)
T AXIS: 62 DEGREES
TROPONIN I HIGH SENSITIVITY: 53 NG/L
UROBILINOGEN UR STRIP.AUTO-MCNC: 0.2 MG/DL
V CHOLERAE DNA SPEC QL NAA+PROBE: NEGATIVE
VENTRICULAR RATE: 78 BPM
VIBRIO DNA SPEC NAA+PROBE: NEGATIVE
WBC # BLD AUTO: 10.6 X10(3) UL (ref 4–11)
YERSINIA DNA SPEC NAA+PROBE: NEGATIVE

## 2023-05-05 PROCEDURE — 87493 C DIFF AMPLIFIED PROBE: CPT | Performed by: EMERGENCY MEDICINE

## 2023-05-05 PROCEDURE — 82962 GLUCOSE BLOOD TEST: CPT

## 2023-05-05 PROCEDURE — 96361 HYDRATE IV INFUSION ADD-ON: CPT

## 2023-05-05 PROCEDURE — 87507 IADNA-DNA/RNA PROBE TQ 12-25: CPT | Performed by: EMERGENCY MEDICINE

## 2023-05-05 PROCEDURE — 93010 ELECTROCARDIOGRAM REPORT: CPT

## 2023-05-05 PROCEDURE — 93005 ELECTROCARDIOGRAM TRACING: CPT

## 2023-05-05 PROCEDURE — 81003 URINALYSIS AUTO W/O SCOPE: CPT | Performed by: EMERGENCY MEDICINE

## 2023-05-05 PROCEDURE — 71045 X-RAY EXAM CHEST 1 VIEW: CPT | Performed by: EMERGENCY MEDICINE

## 2023-05-05 PROCEDURE — 80053 COMPREHEN METABOLIC PANEL: CPT | Performed by: EMERGENCY MEDICINE

## 2023-05-05 PROCEDURE — 96360 HYDRATION IV INFUSION INIT: CPT

## 2023-05-05 PROCEDURE — 99285 EMERGENCY DEPT VISIT HI MDM: CPT

## 2023-05-05 PROCEDURE — 84484 ASSAY OF TROPONIN QUANT: CPT | Performed by: EMERGENCY MEDICINE

## 2023-05-05 PROCEDURE — 83735 ASSAY OF MAGNESIUM: CPT | Performed by: EMERGENCY MEDICINE

## 2023-05-05 PROCEDURE — 85025 COMPLETE CBC W/AUTO DIFF WBC: CPT | Performed by: EMERGENCY MEDICINE

## 2023-05-05 RX ORDER — LIDOCAINE HYDROCHLORIDE 20 MG/ML
10 SOLUTION OROPHARYNGEAL ONCE
Status: COMPLETED | OUTPATIENT
Start: 2023-05-05 | End: 2023-05-05

## 2023-05-05 RX ORDER — SODIUM CHLORIDE 9 MG/ML
INJECTION, SOLUTION INTRAVENOUS CONTINUOUS
Status: DISCONTINUED | OUTPATIENT
Start: 2023-05-05 | End: 2023-05-05

## 2023-05-05 RX ORDER — MAGNESIUM HYDROXIDE/ALUMINUM HYDROXICE/SIMETHICONE 120; 1200; 1200 MG/30ML; MG/30ML; MG/30ML
30 SUSPENSION ORAL ONCE
Status: COMPLETED | OUTPATIENT
Start: 2023-05-05 | End: 2023-05-05

## 2023-05-05 NOTE — TELEPHONE ENCOUNTER
Patient called in requesting a call from [place] or a nurse to speak about medication since he was just seen at THE [facility] In [place]. Please advise.

## 2023-05-05 NOTE — DISCHARGE INSTRUCTIONS
Follow-up with your primary care doctor in the next 2 to 3 days for reassessment. Discuss any further recommendations on your diabetes medicine with them. Return for any severe pain, weakness, or any other concerning symptoms.

## 2023-05-05 NOTE — ED INITIAL ASSESSMENT (HPI)
Pt was given trulicity on Tuesday and has had diarrhea took pepto bismol yesterday and feeling weak. Noon yesterday started having diarrhea. Didn't have an appetite.

## 2023-05-08 NOTE — TELEPHONE ENCOUNTER
From: Lorain Kocher Bonomo  To: Soraida Lantigua MD  Sent: 5/5/2023 3:30 PM CDT  Subject: EMERGENCY ROON FOLLOW UP    I felt terable beginning yesterday afternoon. No appitite could not eat diarrhea painfull belching. Blood sugars were 200 170 193 225 between 5 and 10:30 PM poor sleeping. Called the office this AM doc on call suggested I visit the emergency room. notes are in [address]. I did not take 7.5 glipizide or Jardiance today. When do I continue ? Glipizide tonight?

## 2023-05-08 NOTE — TELEPHONE ENCOUNTER
RN informed PCP of pt's most recent [facility]. Dr. Aileen Irby is going to reach out to the pt.

## 2023-05-08 NOTE — TELEPHONE ENCOUNTER
[phone] spoke to pt's wife Pradip Daniels (on verbal). Pt is at a mens' breakfast, should be back in 1-2 hours. Will have pt call RN back. Addended by: DILLON VALLE on: 1/17/2022 05:09 PM     Modules accepted: Orders

## 2023-07-21 DIAGNOSIS — R39.14 BENIGN PROSTATIC HYPERPLASIA WITH INCOMPLETE BLADDER EMPTYING: ICD-10-CM

## 2023-07-21 DIAGNOSIS — N40.1 BENIGN PROSTATIC HYPERPLASIA WITH INCOMPLETE BLADDER EMPTYING: ICD-10-CM

## 2023-07-24 ENCOUNTER — PATIENT MESSAGE (OUTPATIENT)
Dept: INTERNAL MEDICINE CLINIC | Facility: CLINIC | Age: 88
End: 2023-07-24

## 2023-07-24 DIAGNOSIS — R26.89 NEED FOR ASSISTANCE DUE TO UNSTEADY GAIT: ICD-10-CM

## 2023-07-24 DIAGNOSIS — E78.2 MIXED HYPERLIPIDEMIA: ICD-10-CM

## 2023-07-24 DIAGNOSIS — M48.062 SPINAL STENOSIS OF LUMBAR REGION WITH NEUROGENIC CLAUDICATION: ICD-10-CM

## 2023-07-24 DIAGNOSIS — E11.9 CONTROLLED TYPE 2 DIABETES MELLITUS WITHOUT COMPLICATION, WITHOUT LONG-TERM CURRENT USE OF INSULIN (HCC): ICD-10-CM

## 2023-07-24 DIAGNOSIS — Z00.00 ROUTINE GENERAL MEDICAL EXAMINATION AT A HEALTH CARE FACILITY: ICD-10-CM

## 2023-07-24 DIAGNOSIS — E03.9 HYPOTHYROIDISM, UNSPECIFIED TYPE: ICD-10-CM

## 2023-07-24 DIAGNOSIS — I48.91 ATRIAL FIBRILLATION, UNSPECIFIED TYPE (HCC): ICD-10-CM

## 2023-07-24 DIAGNOSIS — I10 ESSENTIAL HYPERTENSION: ICD-10-CM

## 2023-07-24 RX ORDER — TAMSULOSIN HYDROCHLORIDE 0.4 MG/1
0.4 CAPSULE ORAL DAILY
Qty: 90 CAPSULE | Refills: 3 | OUTPATIENT
Start: 2023-07-24

## 2023-07-24 NOTE — TELEPHONE ENCOUNTER
Requested Prescriptions     Pending Prescriptions Disp Refills    TAMSULOSIN 0.4 MG Oral Cap [Pharmacy Med Name: TAMSULOSIN 0.4MG CAPSULES] 90 capsule 3     Sig: TAKE 1 CAPSULE(0.4 MG) BY MOUTH DAILY     LOV 4/14/23  RTC 6 months   Filled 10/7/22 90 capsules 3 refils   No future appointments.

## 2023-08-10 ENCOUNTER — LAB ENCOUNTER (OUTPATIENT)
Dept: LAB | Age: 88
End: 2023-08-10
Attending: INTERNAL MEDICINE
Payer: MEDICARE

## 2023-08-10 DIAGNOSIS — I15.2 HYPERTENSION ASSOCIATED WITH DIABETES: ICD-10-CM

## 2023-08-10 DIAGNOSIS — E11.59 HYPERTENSION ASSOCIATED WITH DIABETES: ICD-10-CM

## 2023-08-10 LAB
EST. AVERAGE GLUCOSE BLD GHB EST-MCNC: 160 MG/DL (ref 68–126)
HBA1C MFR BLD: 7.2 % (ref ?–5.7)

## 2023-08-10 PROCEDURE — 36415 COLL VENOUS BLD VENIPUNCTURE: CPT

## 2023-08-10 PROCEDURE — 83036 HEMOGLOBIN GLYCOSYLATED A1C: CPT

## 2023-08-11 NOTE — TELEPHONE ENCOUNTER
Dr. Leonora Lee pt. Losartan 100 mg  Filled 11-11-22  Qty 90  2 refills  No upcoming appt. LOV 4-14-23 KS  RTC 6 mo.   Labs 3-31-23 BMP

## 2023-08-12 RX ORDER — LOSARTAN POTASSIUM 100 MG/1
100 TABLET ORAL DAILY
Qty: 90 TABLET | Refills: 2 | Status: SHIPPED | OUTPATIENT
Start: 2023-08-12

## 2023-08-23 ENCOUNTER — PATIENT MESSAGE (OUTPATIENT)
Dept: INTERNAL MEDICINE CLINIC | Facility: CLINIC | Age: 88
End: 2023-08-23

## 2023-08-23 NOTE — TELEPHONE ENCOUNTER
From: Amparo Roman  To: Jackson Buenrostro MD  Sent: 8/23/2023 12:11 PM CDT  Subject: Fall immunizations    Dr. Doug Weinstein  Peg and I will appreciate your direction for the following RSV - yes or no - solo or with other shots? FLU solo or with other shots - interval between? COVID BOOSTER solo or with other shots - interval?  Previous COVID shots have been [address]. Is it best to stay with the same supplier moving forward?

## 2023-08-25 DIAGNOSIS — E11.9 DIABETES MELLITUS WITHOUT COMPLICATION (HCC): ICD-10-CM

## 2023-08-25 RX ORDER — SPIRONOLACTONE 25 MG/1
25 TABLET ORAL DAILY
Qty: 90 TABLET | Refills: 1 | Status: SHIPPED | OUTPATIENT
Start: 2023-08-25

## 2023-08-25 NOTE — TELEPHONE ENCOUNTER
SPIRONOLACTONE 25MG TABLETS          Will file in chart as: SPIRONOLACTONE 25 MG Oral Tab    Sig: TAKE 1 TABLET(25 MG) BY MOUTH DAILY    Disp: 90 tablet    Refills: 1 (Pharmacy requested: Not specified)    Start: 8/25/2023    Class: Normal    Non-formulary For: Diabetes mellitus without complication ([facility]    Last ordered: 5 months ago by Nazia Morales MD Last refill: 6/1/2023    Rx #: [id number]    Hypertension Medications Protocol [id number]/25/2023 09:26 AM   Protocol Details CMP or BMP in past 12 months    Last serum creatinine< 2.0    Appointment in past 6 or next 3 months      LOV 4/14/23  RTC 6 months   Filled 3/2/23 90 tabs 1 refill   Last labs 3/31/23  No future appointments.

## 2023-09-12 ENCOUNTER — PATIENT MESSAGE (OUTPATIENT)
Dept: INTERNAL MEDICINE CLINIC | Facility: CLINIC | Age: 88
End: 2023-09-12

## 2023-09-12 RX ORDER — DULAGLUTIDE 0.75 MG/.5ML
0.75 INJECTION, SOLUTION SUBCUTANEOUS WEEKLY
Qty: 3 ML | Refills: 1 | Status: SHIPPED | OUTPATIENT
Start: 2023-09-12

## 2023-09-16 RX ORDER — DOCUSATE SODIUM 100 MG/1
100 CAPSULE, LIQUID FILLED ORAL 2 TIMES DAILY PRN
Qty: 180 CAPSULE | Refills: 3 | Status: SHIPPED | OUTPATIENT
Start: 2023-09-16

## 2023-10-13 ENCOUNTER — PATIENT MESSAGE (OUTPATIENT)
Dept: INTERNAL MEDICINE CLINIC | Facility: CLINIC | Age: 88
End: 2023-10-13

## 2023-10-16 ENCOUNTER — PATIENT MESSAGE (OUTPATIENT)
Dept: INTERNAL MEDICINE CLINIC | Facility: CLINIC | Age: 88
End: 2023-10-16

## 2023-10-16 DIAGNOSIS — E11.69 DIABETES MELLITUS TYPE 2 IN OBESE: Primary | ICD-10-CM

## 2023-10-16 DIAGNOSIS — E66.9 DIABETES MELLITUS TYPE 2 IN OBESE: Primary | ICD-10-CM

## 2023-10-16 NOTE — TELEPHONE ENCOUNTER
From: Latasha Roman  To:  Koby Weinstein  Sent: 10/13/2023 1:54 PM CDT  Subject: immunizations    FYI   Peg & I had our covid shots yesterday which brings us up to date with flu and RSV

## 2023-10-17 NOTE — TELEPHONE ENCOUNTER
Labs ordered. Complete them in November.  Can offer him a 30 day SDA slot for medicare wellness exam sooner than January

## 2023-10-17 NOTE — TELEPHONE ENCOUNTER
From: Fariba Roman  To: Og Party  Sent: 10/16/2023 1:56 PM CDT  Subject: Wellness visit & labs    I m due for my annual wellness visit this October but your first availability is in January. When do you want to schedule full labs. Do you want to check my A1C separately?

## 2023-10-17 NOTE — TELEPHONE ENCOUNTER
Patient aware  Appt r/s     Future Appointments   Date Time Provider Pam Nazia   11/6/2023  2:00 PM Codey Young MD [facility] 8 [facility] [place]

## 2023-10-23 NOTE — PROGRESS NOTES
PHYSICAL THERAPY LE LYMPHEDEMA EVALUATION:   Referring Physician: Dr. Vijay Henry  Diagnosis: Ana Hartmann     Date of Service: 5/14/2019     PATIENT SUMMARY   PEDRO Anne is a 80year old male who presents with B LE edema.   Pt reports slight swellin Spoke with myranda, [facility] working on new agency.   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    Compression:  Measurements take for [facility] Please co-sign or sign and return this letter via fax as soon as possible to [phone].  If you have any questions, please contact me at Dept: [phone]    Sincerely,  Electronically signed by therapist: Orion Bolanos, PT    Physician's certification

## 2023-10-25 DIAGNOSIS — N40.1 BENIGN PROSTATIC HYPERPLASIA WITH INCOMPLETE BLADDER EMPTYING: ICD-10-CM

## 2023-10-25 DIAGNOSIS — R39.14 BENIGN PROSTATIC HYPERPLASIA WITH INCOMPLETE BLADDER EMPTYING: ICD-10-CM

## 2023-10-26 RX ORDER — FINASTERIDE 5 MG/1
5 TABLET, FILM COATED ORAL DAILY
Qty: 90 TABLET | Refills: 0 | Status: SHIPPED | OUTPATIENT
Start: 2023-10-26

## 2023-10-26 RX ORDER — TAMSULOSIN HYDROCHLORIDE 0.4 MG/1
0.4 CAPSULE ORAL DAILY
Qty: 90 CAPSULE | Refills: 3 | Status: SHIPPED | OUTPATIENT
Start: 2023-10-26

## 2023-10-26 NOTE — TELEPHONE ENCOUNTER
Requested Prescriptions     Pending Prescriptions Disp Refills    FINASTERIDE 5 MG Oral Tab [Pharmacy Med Name: Finasteride 5 Mg Tab Auro] 90 tablet 0     Sig: Take 1 tablet (5 mg total) by mouth in the morning.      No protocol     LOV 4/14/23  RTC 6 months  Filled 11/5/22 90 tabs 3 refills   Future Appointments   Date Time Provider Pam Nazia   11/1/2023 10:00 AM REF BBK REF EMG8 Ref BBK 8   11/6/2023  2:00 PM [facility] Dc [facility]

## 2023-10-26 NOTE — TELEPHONE ENCOUNTER
Requested Prescriptions     Pending Prescriptions Disp Refills    tamsulosin 0.4 MG Oral Cap 90 capsule 3     Sig: Take 1 capsule (0.4 mg total) by mouth daily.      No protocol     LOV 4/14/23  RTC 6 months  Filled 10/7/22 90 caps 3 refills   Future Appointments   Date Time Provider Pam Mandujano   11/1/2023 10:00 AM REF BBK REF EMG8 Ref BBK 8   11/6/2023  2:00 PM Quintin Key MD [facility] 8 [facility] [place]

## 2023-10-31 ENCOUNTER — LAB ENCOUNTER (OUTPATIENT)
Dept: LAB | Age: 88
End: 2023-10-31
Attending: INTERNAL MEDICINE
Payer: MEDICARE

## 2023-10-31 DIAGNOSIS — E66.9 DIABETES MELLITUS TYPE 2 IN OBESE: ICD-10-CM

## 2023-10-31 DIAGNOSIS — E11.69 DIABETES MELLITUS TYPE 2 IN OBESE: ICD-10-CM

## 2023-10-31 LAB
ALT SERPL-CCNC: 41 U/L
ANION GAP SERPL CALC-SCNC: 8 MMOL/L (ref 0–18)
AST SERPL-CCNC: 29 U/L (ref 15–37)
BUN BLD-MCNC: 32 MG/DL (ref 9–23)
CALCIUM BLD-MCNC: 10 MG/DL (ref 8.5–10.1)
CHLORIDE SERPL-SCNC: 107 MMOL/L (ref 98–112)
CO2 SERPL-SCNC: 22 MMOL/L (ref 21–32)
CREAT BLD-MCNC: 1.63 MG/DL
EGFRCR SERPLBLD CKD-EPI 2021: 40 ML/MIN/1.73M2 (ref 60–?)
EST. AVERAGE GLUCOSE BLD GHB EST-MCNC: 148 MG/DL (ref 68–126)
FASTING STATUS PATIENT QL REPORTED: YES
GLUCOSE BLD-MCNC: 123 MG/DL (ref 70–99)
HBA1C MFR BLD: 6.8 % (ref ?–5.7)
OSMOLALITY SERPL CALC.SUM OF ELEC: 292 MOSM/KG (ref 275–295)
POTASSIUM SERPL-SCNC: 4.1 MMOL/L (ref 3.5–5.1)
SODIUM SERPL-SCNC: 137 MMOL/L (ref 136–145)

## 2023-10-31 PROCEDURE — 84450 TRANSFERASE (AST) (SGOT): CPT

## 2023-10-31 PROCEDURE — 80048 BASIC METABOLIC PNL TOTAL CA: CPT

## 2023-10-31 PROCEDURE — 36415 COLL VENOUS BLD VENIPUNCTURE: CPT

## 2023-10-31 PROCEDURE — 84460 ALANINE AMINO (ALT) (SGPT): CPT

## 2023-10-31 PROCEDURE — 83036 HEMOGLOBIN GLYCOSYLATED A1C: CPT

## 2023-11-06 ENCOUNTER — OFFICE VISIT (OUTPATIENT)
Dept: INTERNAL MEDICINE CLINIC | Facility: CLINIC | Age: 88
End: 2023-11-06
Payer: MEDICARE

## 2023-11-06 ENCOUNTER — LAB ENCOUNTER (OUTPATIENT)
Dept: LAB | Age: 88
End: 2023-11-06
Attending: INTERNAL MEDICINE
Payer: MEDICARE

## 2023-11-06 VITALS
HEART RATE: 69 BPM | RESPIRATION RATE: 18 BRPM | BODY MASS INDEX: 29.95 KG/M2 | OXYGEN SATURATION: 96 % | DIASTOLIC BLOOD PRESSURE: 68 MMHG | TEMPERATURE: 98 F | SYSTOLIC BLOOD PRESSURE: 122 MMHG | HEIGHT: 69 IN | WEIGHT: 202.19 LBS

## 2023-11-06 DIAGNOSIS — E11.69 HYPERLIPIDEMIA DUE TO TYPE 2 DIABETES MELLITUS: ICD-10-CM

## 2023-11-06 DIAGNOSIS — E78.5 HYPERLIPIDEMIA DUE TO TYPE 2 DIABETES MELLITUS: ICD-10-CM

## 2023-11-06 DIAGNOSIS — N18.30 CONTROLLED TYPE 2 DIABETES MELLITUS WITH STAGE 3 CHRONIC KIDNEY DISEASE, WITHOUT LONG-TERM CURRENT USE OF INSULIN (HCC): ICD-10-CM

## 2023-11-06 DIAGNOSIS — Z00.00 ROUTINE GENERAL MEDICAL EXAMINATION AT A HEALTH CARE FACILITY: ICD-10-CM

## 2023-11-06 DIAGNOSIS — E78.2 MIXED HYPERLIPIDEMIA: ICD-10-CM

## 2023-11-06 DIAGNOSIS — N40.1 BENIGN PROSTATIC HYPERPLASIA WITH INCOMPLETE BLADDER EMPTYING: ICD-10-CM

## 2023-11-06 DIAGNOSIS — I48.91 ATRIAL FIBRILLATION, UNSPECIFIED TYPE (HCC): ICD-10-CM

## 2023-11-06 DIAGNOSIS — I48.0 PAROXYSMAL ATRIAL FIBRILLATION (HCC): ICD-10-CM

## 2023-11-06 DIAGNOSIS — E03.9 ACQUIRED HYPOTHYROIDISM: ICD-10-CM

## 2023-11-06 DIAGNOSIS — R39.14 BENIGN PROSTATIC HYPERPLASIA WITH INCOMPLETE BLADDER EMPTYING: ICD-10-CM

## 2023-11-06 DIAGNOSIS — E11.22 CONTROLLED TYPE 2 DIABETES MELLITUS WITH STAGE 3 CHRONIC KIDNEY DISEASE, WITHOUT LONG-TERM CURRENT USE OF INSULIN (HCC): ICD-10-CM

## 2023-11-06 DIAGNOSIS — G47.33 OSA ON CPAP: ICD-10-CM

## 2023-11-06 DIAGNOSIS — E03.9 ACQUIRED HYPOTHYROIDISM: Primary | ICD-10-CM

## 2023-11-06 DIAGNOSIS — E11.59 HYPERTENSION ASSOCIATED WITH DIABETES: ICD-10-CM

## 2023-11-06 DIAGNOSIS — E11.42 CONTROLLED TYPE 2 DIABETES MELLITUS WITH DIABETIC POLYNEUROPATHY, WITHOUT LONG-TERM CURRENT USE OF INSULIN (HCC): ICD-10-CM

## 2023-11-06 DIAGNOSIS — I15.2 HYPERTENSION ASSOCIATED WITH DIABETES: ICD-10-CM

## 2023-11-06 DIAGNOSIS — K21.00 GASTROESOPHAGEAL REFLUX DISEASE WITH ESOPHAGITIS WITHOUT HEMORRHAGE: ICD-10-CM

## 2023-11-06 PROBLEM — I10 HYPERTENSION: Status: RESOLVED | Noted: 2019-04-09 | Resolved: 2023-11-06

## 2023-11-06 PROBLEM — E66.9 TYPE 2 DIABETES MELLITUS WITH OBESITY  (HCC): Status: RESOLVED | Noted: 2021-08-13 | Resolved: 2023-11-06

## 2023-11-06 PROBLEM — E66.9 TYPE 2 DIABETES MELLITUS WITH OBESITY (HCC): Status: ACTIVE | Noted: 2021-08-13

## 2023-11-06 PROBLEM — I10 HYPERTENSION: Status: ACTIVE | Noted: 2019-04-09

## 2023-11-06 PROBLEM — E66.9 TYPE 2 DIABETES MELLITUS WITH OBESITY  (HCC): Status: ACTIVE | Noted: 2021-08-13

## 2023-11-06 PROBLEM — E66.9 DIABETES MELLITUS TYPE 2 IN OBESE  (HCC): Status: RESOLVED | Noted: 2019-02-12 | Resolved: 2023-11-06

## 2023-11-06 PROBLEM — E11.65 UNCONTROLLED TYPE 2 DIABETES MELLITUS WITH HYPERGLYCEMIA (HCC): Status: RESOLVED | Noted: 2023-05-03 | Resolved: 2023-11-06

## 2023-11-06 PROBLEM — E66.9 TYPE 2 DIABETES MELLITUS WITH OBESITY: Status: ACTIVE | Noted: 2021-08-13

## 2023-11-06 PROBLEM — E66.9 DIABETES MELLITUS TYPE 2 IN OBESE: Status: RESOLVED | Noted: 2019-02-12 | Resolved: 2023-11-06

## 2023-11-06 PROBLEM — E66.9 TYPE 2 DIABETES MELLITUS WITH OBESITY: Status: RESOLVED | Noted: 2021-08-13 | Resolved: 2023-11-06

## 2023-11-06 PROBLEM — D68.69 OTHER THROMBOPHILIA (HCC): Status: RESOLVED | Noted: 2023-04-14 | Resolved: 2023-11-06

## 2023-11-06 PROBLEM — E66.9 TYPE 2 DIABETES MELLITUS WITH OBESITY (HCC): Status: RESOLVED | Noted: 2021-08-13 | Resolved: 2023-11-06

## 2023-11-06 LAB
T4 FREE SERPL-MCNC: 1 NG/DL (ref 0.8–1.7)
TSI SER-ACNC: 2.55 MIU/ML (ref 0.36–3.74)

## 2023-11-06 PROCEDURE — 84439 ASSAY OF FREE THYROXINE: CPT

## 2023-11-06 PROCEDURE — 84443 ASSAY THYROID STIM HORMONE: CPT

## 2023-11-06 PROCEDURE — 36415 COLL VENOUS BLD VENIPUNCTURE: CPT

## 2023-11-06 RX ORDER — FINASTERIDE 5 MG/1
5 TABLET, FILM COATED ORAL DAILY
Qty: 90 TABLET | Refills: 3 | Status: SHIPPED | OUTPATIENT
Start: 2023-11-06

## 2023-11-06 RX ORDER — PRAVASTATIN SODIUM 40 MG
40 TABLET ORAL NIGHTLY
Qty: 90 TABLET | Refills: 3 | Status: SHIPPED | OUTPATIENT
Start: 2023-11-06

## 2023-11-06 RX ORDER — DULAGLUTIDE 0.75 MG/.5ML
0.75 INJECTION, SOLUTION SUBCUTANEOUS WEEKLY
Qty: 3 ML | Refills: 3 | Status: SHIPPED | OUTPATIENT
Start: 2023-11-06

## 2023-11-06 NOTE — PATIENT INSTRUCTIONS
For your diabetes, please continue trulicity at 6.69 mg weekly and decrease your glipizide to 7.5 mg with breakfast only. Please stop glipizide in evening. Please send me your blood sugar log 4 weeks after stopping the evening glipizide. Please check both fasting sugars and 2 hours after a meal.     I recommend the following vaccines -  TDAP (tetanus, diptheriae, pertussis) vaccine every 10 years. Please repeat your labs in 3-6 months depending on how your home blood sugars are with the change in glipizide.

## 2023-11-07 RX ORDER — LEVOTHYROXINE SODIUM 88 UG/1
88 TABLET ORAL
Qty: 90 TABLET | Refills: 3 | Status: SHIPPED | OUTPATIENT
Start: 2023-11-07

## 2023-11-23 DIAGNOSIS — E11.9 DIABETES MELLITUS WITHOUT COMPLICATION (HCC): ICD-10-CM

## 2023-11-24 RX ORDER — SPIRONOLACTONE 25 MG/1
25 TABLET ORAL DAILY
Qty: 90 TABLET | Refills: 3 | Status: SHIPPED | OUTPATIENT
Start: 2023-11-24

## 2023-11-24 NOTE — TELEPHONE ENCOUNTER
spironolactone 25 MG Oral Tab          Sig: Take 1 tablet (25 mg total) by mouth daily. Disp: 90 tablet    Refills: 1    Start: 11/23/2023    Class: Normal    Non-formulary For: Diabetes mellitus without complication ([facility]    Last ordered: 3 months ago (8/25/2023) by Chinedu Polanco MD    Hypertension Medications Protocol [id number]/23/2023 11:04 AM   Protocol Details CMP or BMP in past 12 months    Last serum creatinine< 2.0    Appointment in past 6 or next 3 months      LOV 11/6/23  RTC 6 months  Filled 8/25/23 90 tabs 1 refill  Last labs 10/31/23  No future appointments.

## 2024-01-03 ENCOUNTER — TELEPHONE (OUTPATIENT)
Dept: INTERNAL MEDICINE CLINIC | Facility: CLINIC | Age: 89
End: 2024-01-03

## 2024-01-03 NOTE — TELEPHONE ENCOUNTER
Patient calling to make sure we received prior authorization request for Trulicity. His part D changed to [facility] effective 01/01/2024 and he would like to make sure that we received the fax from [employer] with prior auth request.     We did receive request it is in Dr. Layton's fax folder.

## 2024-01-04 RX ORDER — OMEPRAZOLE 20 MG/1
20 CAPSULE, DELAYED RELEASE ORAL EVERY MORNING
Qty: 90 CAPSULE | Refills: 3 | Status: SHIPPED | OUTPATIENT
Start: 2024-01-04

## 2024-01-04 NOTE — TELEPHONE ENCOUNTER
Omeprazole 20 mg  Filled 1-10-23  Qty 90  3 refills  No future appointments.  LOV 11-6-23 [place]  RTC 6 mo.

## 2024-01-05 NOTE — TELEPHONE ENCOUNTER
Bhupendra Roman (Key: [id number])    Your information has been sent to [facility].    Initiated through covermymeds     Awaiting payer response

## 2024-01-05 NOTE — TELEPHONE ENCOUNTER
Patient is calling to f/u on his Trulicity Rx    Informed pt that we received PA request and are currently working on it    Pt is really adamant on this PA form for his plan d provider     I informed pt that we are working on the PA request as we received this electronically this morning    Pt stated that they faxed over a form to our office on Wednesday for [place]    Pt stated this is a 1x/week that's needed and is $1,200

## 2024-01-05 NOTE — TELEPHONE ENCOUNTER
Outcome  Approved today  Approved. This drug has been approved under the Member's Medicare Part D benefit. Approved quantity: 2 units per 28 day(s). You may fill up to a 90 day supply except for those on Specialty Tier 5, which can be filled up to a 30 day supply. Please call the pharmacy to process the prescription claim.  Authorization Expiration Date: 12/30/2039      Pharmacy informed   They do not have patients updated insurance info     LVM for patient informing

## 2024-01-10 RX ORDER — HYDROCHLOROTHIAZIDE 25 MG/1
25 TABLET ORAL DAILY
Qty: 90 TABLET | Refills: 3 | Status: SHIPPED | OUTPATIENT
Start: 2024-01-10

## 2024-01-10 NOTE — TELEPHONE ENCOUNTER
Protocol passed     Hydrochlorothiazide 25mg     LOV: 11/6/23   RTC: 6 months   Filled: 1/26/23 # 90 3 refills   Labs: 10/31/23   No future appointments.

## 2024-01-16 RX ORDER — BLOOD SUGAR DIAGNOSTIC
STRIP MISCELLANEOUS
Qty: 100 STRIP | Refills: 3 | Status: SHIPPED | OUTPATIENT
Start: 2024-01-16

## 2024-01-17 ENCOUNTER — TELEPHONE (OUTPATIENT)
Dept: INTERNAL MEDICINE CLINIC | Facility: CLINIC | Age: 89
End: 2024-01-17

## 2024-01-17 NOTE — TELEPHONE ENCOUNTER
Incoming fax from [facility] needing a New Medicare order for Diabetic Testing Supplies completed and faxed back.    Placed in KS in basket for review and signature.

## 2024-01-17 NOTE — TELEPHONE ENCOUNTER
Faxed signed Diabetic Testing Supply Order form back to [place]. Received Confirmation, and sent one copy to scan and one in blue folder pod #2

## 2024-01-26 DIAGNOSIS — R39.14 BENIGN PROSTATIC HYPERPLASIA WITH INCOMPLETE BLADDER EMPTYING: ICD-10-CM

## 2024-01-26 DIAGNOSIS — N40.1 BENIGN PROSTATIC HYPERPLASIA WITH INCOMPLETE BLADDER EMPTYING: ICD-10-CM

## 2024-01-26 RX ORDER — FINASTERIDE 5 MG/1
5 TABLET, FILM COATED ORAL DAILY
Qty: 90 TABLET | Refills: 3 | Status: SHIPPED | OUTPATIENT
Start: 2024-01-26

## 2024-01-26 NOTE — TELEPHONE ENCOUNTER
Protocol NONE    Requesting: finasteride 5 MG Oral Tab     LOV: 11/6/23  RTC: 6 months  Filled: 11/6/23 90 tablet 3 refills  Recent Labs: 10/31/23    Upcoming OV   No future appointments.

## 2024-01-30 ENCOUNTER — TELEPHONE (OUTPATIENT)
Dept: INTERNAL MEDICINE CLINIC | Facility: CLINIC | Age: 89
End: 2024-01-30

## 2024-02-20 NOTE — TELEPHONE ENCOUNTER
ED call back, pt states, \"I'm doing fine.\" No further questions or concerns expressed when asked.      Spoke to pt spouse - on communication waiver. States will have pt call office as soon as he is home.

## 2024-04-22 ENCOUNTER — TELEPHONE (OUTPATIENT)
Dept: INTERNAL MEDICINE CLINIC | Facility: CLINIC | Age: 89
End: 2024-04-22

## 2024-04-23 RX ORDER — DULAGLUTIDE 0.75 MG/.5ML
0.75 INJECTION, SOLUTION SUBCUTANEOUS
Qty: 2 ML | Refills: 0 | OUTPATIENT
Start: 2024-04-23

## 2024-04-23 NOTE — TELEPHONE ENCOUNTER
Requesting    Name from pharmacy: TRULICITY 0.75MG/0.5ML SDP 0.5ML         Will file in chart as: TRULICITY 0.75 MG/0.5ML Subcutaneous Solution Pen-injector    Sig: ADMINISTER 0.75 MG UNDER THE SKIN 1 TIME A WEEK    Disp: 2 mL    Refills: 0 (Pharmacy requested: Not specified)    Start: 4/22/2024    Class: Normal    Non-formulary    Last ordered: 3 weeks ago (3/27/2024) by Emily Garcia MD    Last refill: 3/27/2024    Rx #: [id number]    Diabetes Medication Protocol [id number]/22/2024 09:27 PM   Protocol Details EGFRCR or GFRNAA > 50    Last A1C < 7.5 and within past 6 months    In person appointment or virtual visit in the past 6 mos or appointment in next 3 mos    Microalbumin procedure in past 12 months or taking ACE/ARB    GFR in the past 12 months      To be filled at: [facility] #[id number] - [place], [place] - [address] AT [facility] [address], [phone], [phone]     LOV: 11/06/23 w/ KS  RTC: 05/06/24  Last Relevant Labs: 10/31/23   Filled: 03/27/24 #2 mL with 0 refills    No future appointments.

## 2024-04-24 ENCOUNTER — TELEPHONE (OUTPATIENT)
Dept: INTERNAL MEDICINE CLINIC | Facility: CLINIC | Age: 89
End: 2024-04-24

## 2024-04-24 NOTE — TELEPHONE ENCOUNTER
Faxed [facility] with confirmation    Placed to scan and in accordion folder in Pod 2

## 2024-04-24 NOTE — TELEPHONE ENCOUNTER
Pt said he did not tell his pharmacy to request a refill please disregarded request. Pt would also like to let pcp know that for the last 4 wks he has been on  the 0.75 as medication was on back ordered for 3 wks nut is now back on 1.5

## 2024-05-25 ENCOUNTER — V-VISIT (OUTPATIENT)
Dept: URGENT CARE | Age: 89
End: 2024-05-25

## 2024-05-25 VITALS
HEART RATE: 77 BPM | WEIGHT: 198 LBS | RESPIRATION RATE: 18 BRPM | SYSTOLIC BLOOD PRESSURE: 126 MMHG | BODY MASS INDEX: 29.33 KG/M2 | HEIGHT: 69 IN | OXYGEN SATURATION: 98 % | TEMPERATURE: 97 F | DIASTOLIC BLOOD PRESSURE: 76 MMHG
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
## 2024-05-25 DIAGNOSIS — L08.9 INFECTED INSECT BITE OF FACE, INITIAL ENCOUNTER: Primary | ICD-10-CM

## 2024-05-25 DIAGNOSIS — S00.86XA INFECTED INSECT BITE OF FACE, INITIAL ENCOUNTER: Primary | ICD-10-CM

## 2024-05-25 PROBLEM — H35.3110: Status: ACTIVE | Noted: 2019-09-13

## 2024-05-25 PROBLEM — E66.9 TYPE 2 DIABETES MELLITUS WITH OBESITY  (CMD): Status: ACTIVE | Noted: 2019-02-12

## 2024-05-25 PROBLEM — E11.69 TYPE 2 DIABETES MELLITUS WITH OBESITY  (CMD): Status: ACTIVE | Noted: 2019-02-12

## 2024-05-25 PROBLEM — E11.69 HYPERLIPIDEMIA DUE TO TYPE 2 DIABETES MELLITUS  (CMD): Status: ACTIVE | Noted: 2019-04-09

## 2024-05-25 PROBLEM — L50.9 URTICARIA: Status: ACTIVE | Noted: 2024-05-25

## 2024-05-25 PROBLEM — I10 PRIMARY HYPERTENSION: Status: ACTIVE | Noted: 2024-05-25

## 2024-05-25 PROBLEM — E78.5 HYPERLIPIDEMIA DUE TO TYPE 2 DIABETES MELLITUS  (CMD): Status: ACTIVE | Noted: 2019-04-09

## 2024-05-25 RX ORDER — CHLORAL HYDRATE 500 MG
CAPSULE ORAL DAILY
COMMUNITY

## 2024-05-25 RX ORDER — PSEUDOEPHEDRINE HCL 30 MG
100 TABLET ORAL DAILY
COMMUNITY

## 2024-05-25 RX ORDER — DIPHENOXYLATE HYDROCHLORIDE AND ATROPINE SULFATE 2.5; .025 MG/1; MG/1
1 TABLET ORAL DAILY
COMMUNITY
Start: 2010-11-16

## 2024-05-25 RX ORDER — LEVOTHYROXINE SODIUM 0.07 MG/1
75 TABLET ORAL DAILY
COMMUNITY
Start: 2013-08-20

## 2024-05-25 RX ORDER — LOSARTAN POTASSIUM 100 MG/1
TABLET ORAL
COMMUNITY
Start: 2024-02-20

## 2024-05-25 RX ORDER — AMOXICILLIN AND CLAVULANATE POTASSIUM 875; 125 MG/1; MG/1
TABLET, FILM COATED ORAL
Qty: 20 TABLET | Refills: 0 | Status: SHIPPED | OUTPATIENT
Start: 2024-05-25 | End: 2024-06-05

## 2024-05-25 RX ORDER — OMEPRAZOLE 20 MG/1
20 CAPSULE, DELAYED RELEASE ORAL
COMMUNITY
Start: 2015-01-07

## 2024-05-25 RX ORDER — TAMSULOSIN HYDROCHLORIDE 0.4 MG/1
CAPSULE ORAL
COMMUNITY
Start: 2024-04-22

## 2024-05-25 RX ORDER — GLIPIZIDE 5 MG/1
7.5 TABLET ORAL
COMMUNITY
Start: 2014-09-22

## 2024-05-25 RX ORDER — VIT A/VIT C/VIT E/ZINC/COPPER 2148-113
2 TABLET ORAL DAILY
COMMUNITY
Start: 2014-06-10

## 2024-05-25 RX ORDER — DULAGLUTIDE 1.5 MG/.5ML
INJECTION, SOLUTION SUBCUTANEOUS
COMMUNITY

## 2024-05-25 RX ORDER — SPIRONOLACTONE 25 MG/1
TABLET ORAL
COMMUNITY
Start: 2024-02-20

## 2024-05-25 RX ORDER — HYDROCHLOROTHIAZIDE 25 MG/1
1 TABLET ORAL DAILY
COMMUNITY
Start: 2024-01-10

## 2024-05-25 ASSESSMENT — PAIN SCALES - GENERAL: PAINLEVEL: 0

## 2024-05-28 RX ORDER — LOSARTAN POTASSIUM 100 MG/1
100 TABLET ORAL DAILY
Qty: 90 TABLET | Refills: 2 | Status: SHIPPED | OUTPATIENT
Start: 2024-05-28

## 2024-05-28 NOTE — TELEPHONE ENCOUNTER
[facility] message sent to patient to schedule 6 month follow up     Requesting   Name from pharmacy: LOSARTAN 100MG TABLETS          Will file in chart as: LOSARTAN 100 MG Oral Tab    Sig: TAKE 1 TABLET(100 MG) BY MOUTH DAILY    Disp: 90 tablet    Refills: 2 (Pharmacy requested: Not specified)    Start: 5/25/2024    Class: Normal    Non-formulary    Last ordered: 9 months ago (8/12/2023) by Vincenzo Melgoza MD    Last refill: 2/20/2024    Rx #: [id number]    Hypertension Medications Protocol Vjkwct7105/25/2024 08:03 AM   Protocol Details EGFRCR or GFRNAA > 50    CMP or BMP in past 12 months    Last BP reading less than 140/90    In person appointment or virtual visit in the past 12 mos or appointment in next 3 mos        LOV: 11/6/2023  RTC: 6 months   Last Relevant Labs: 10/31/2023  Filled: 2/20/2024 #90 with 0 refills    No future appointments.

## 2024-06-07 PROBLEM — E11.69 TYPE 2 DIABETES MELLITUS WITH OBESITY (HCC): Status: ACTIVE | Noted: 2019-02-12

## 2024-06-07 PROBLEM — E66.9 TYPE 2 DIABETES MELLITUS WITH OBESITY (HCC): Status: ACTIVE | Noted: 2019-02-12

## 2024-06-19 ENCOUNTER — LAB ENCOUNTER (OUTPATIENT)
Dept: LAB | Age: 89
End: 2024-06-19
Attending: INTERNAL MEDICINE

## 2024-06-19 DIAGNOSIS — E11.22 CONTROLLED TYPE 2 DIABETES MELLITUS WITH STAGE 3 CHRONIC KIDNEY DISEASE, WITHOUT LONG-TERM CURRENT USE OF INSULIN (HCC): ICD-10-CM

## 2024-06-19 DIAGNOSIS — N18.30 CONTROLLED TYPE 2 DIABETES MELLITUS WITH STAGE 3 CHRONIC KIDNEY DISEASE, WITHOUT LONG-TERM CURRENT USE OF INSULIN (HCC): ICD-10-CM

## 2024-06-19 LAB
ANION GAP SERPL CALC-SCNC: 7 MMOL/L (ref 0–18)
BUN BLD-MCNC: 25 MG/DL (ref 9–23)
BUN/CREAT SERPL: 19.1 (ref 10–20)
CALCIUM BLD-MCNC: 10 MG/DL (ref 8.7–10.4)
CHLORIDE SERPL-SCNC: 109 MMOL/L (ref 98–112)
CHOLEST SERPL-MCNC: 131 MG/DL (ref ?–200)
CO2 SERPL-SCNC: 24 MMOL/L (ref 21–32)
CREAT BLD-MCNC: 1.31 MG/DL
CREAT UR-SCNC: 77.8 MG/DL
EGFRCR SERPLBLD CKD-EPI 2021: 52 ML/MIN/1.73M2 (ref 60–?)
EST. AVERAGE GLUCOSE BLD GHB EST-MCNC: 146 MG/DL (ref 68–126)
FASTING PATIENT LIPID ANSWER: YES
FASTING STATUS PATIENT QL REPORTED: YES
GLUCOSE BLD-MCNC: 120 MG/DL (ref 70–99)
HBA1C MFR BLD: 6.7 % (ref ?–5.7)
HDLC SERPL-MCNC: 36 MG/DL (ref 40–59)
LDLC SERPL CALC-MCNC: 73 MG/DL (ref ?–100)
MICROALBUMIN UR-MCNC: <0.3 MG/DL
NONHDLC SERPL-MCNC: 95 MG/DL (ref ?–130)
OSMOLALITY SERPL CALC.SUM OF ELEC: 296 MOSM/KG (ref 275–295)
POTASSIUM SERPL-SCNC: 4.4 MMOL/L (ref 3.5–5.1)
SODIUM SERPL-SCNC: 140 MMOL/L (ref 136–145)
TRIGL SERPL-MCNC: 120 MG/DL (ref 30–149)
VLDLC SERPL CALC-MCNC: 18 MG/DL (ref 0–30)

## 2024-06-19 PROCEDURE — 80048 BASIC METABOLIC PNL TOTAL CA: CPT

## 2024-06-19 PROCEDURE — 82570 ASSAY OF URINE CREATININE: CPT

## 2024-06-19 PROCEDURE — 83036 HEMOGLOBIN GLYCOSYLATED A1C: CPT

## 2024-06-19 PROCEDURE — 36415 COLL VENOUS BLD VENIPUNCTURE: CPT

## 2024-06-19 PROCEDURE — 82043 UR ALBUMIN QUANTITATIVE: CPT

## 2024-06-19 PROCEDURE — 80061 LIPID PANEL: CPT

## 2024-08-13 RX ORDER — DULAGLUTIDE 1.5 MG/.5ML
1.5 INJECTION, SOLUTION SUBCUTANEOUS WEEKLY
Qty: 6 ML | Refills: 1 | Status: SHIPPED | OUTPATIENT
Start: 2024-08-13

## 2024-08-13 NOTE — TELEPHONE ENCOUNTER
Dulaglutide (TRULICITY) 1.5 MG/0.5ML Subcutaneous Solution Pen-injector         Sig: Inject 1.5 mg into the skin once a week. Replaces 0.75 mg dose    Disp: 6 mL    Refills: 1    Start: 8/13/2024    Class: Normal    Non-formulary    Last ordered: 6 months ago (2/15/2024) by Arleen Layton MD    Diabetes Medication Protocol Xkaede1808/13/2024 11:26 AM   Protocol Details In person appointment or virtual visit in the past 6 mos or appointment in next 3 mos    Last A1C < 7.5 and within past 6 months    Microalbumin procedure in past 12 months or taking ACE/ARB    EGFRCR or GFRNAA > 50    GFR in the past 12 months      To be filled at: [facility] #[id number] - [place] - [address] AT [facility] [address], [phone], [phone]          LOV:11/06/2023  RTC: 6 months  Labs:06/19/2024  Last Filled.07/09/2024    No future appointments.

## 2024-08-19 DIAGNOSIS — I15.2 HYPERTENSION ASSOCIATED WITH DIABETES (HCC): ICD-10-CM

## 2024-08-19 DIAGNOSIS — E78.2 MIXED HYPERLIPIDEMIA: ICD-10-CM

## 2024-08-19 DIAGNOSIS — E11.42 CONTROLLED TYPE 2 DIABETES MELLITUS WITH DIABETIC POLYNEUROPATHY, WITHOUT LONG-TERM CURRENT USE OF INSULIN (HCC): ICD-10-CM

## 2024-08-19 DIAGNOSIS — E11.59 HYPERTENSION ASSOCIATED WITH DIABETES (HCC): ICD-10-CM

## 2024-08-19 DIAGNOSIS — E78.5 HYPERLIPIDEMIA DUE TO TYPE 2 DIABETES MELLITUS (HCC): ICD-10-CM

## 2024-08-19 DIAGNOSIS — I48.91 ATRIAL FIBRILLATION, UNSPECIFIED TYPE (HCC): ICD-10-CM

## 2024-08-19 DIAGNOSIS — E11.69 HYPERLIPIDEMIA DUE TO TYPE 2 DIABETES MELLITUS (HCC): ICD-10-CM

## 2024-08-19 RX ORDER — PRAVASTATIN SODIUM 40 MG
40 TABLET ORAL NIGHTLY
Qty: 90 TABLET | Refills: 3 | Status: SHIPPED | OUTPATIENT
Start: 2024-08-19

## 2024-08-19 NOTE — TELEPHONE ENCOUNTER
pravastatin 40 MG Oral Tab         Sig: Take 1 tablet (40 mg total) by mouth nightly.    Disp: 90 tablet    Refills: 3    Start: 8/19/2024    Class: Normal    Non-formulary For: Mixed hyperlipidemia; Hyperlipidemia due to type 2 diabetes mellitus (HCC); Controlled type 2 diabetes mellitus with diabetic polyneuropathy, without long-term current use of insulin (HCC); Atrial fibrillation, unspecified type (HCC); Hypertension associated with diabetes (HCC)    Last ordered: 9 months ago (11/6/2023) by Arleen Layton MD    Cholesterol Medication Protocol Ykxuar1908/19/2024 11:55 AM   Protocol Details ALT < 80    ALT resulted within past year    Lipid panel within past 12 months    In person appointment or virtual visit in the past 12 mos or appointment in next 3 mos      To be filled at: [facility][id number] [place], [place] - [address] AT [facility] [address], [phone], [phone]       LOV: 11/06/2023  RTC:6 month f/u   Labs: 06/19/2024  Last filled: 05/05/2024    No future appointments.

## 2024-09-05 NOTE — TELEPHONE ENCOUNTER
Pt requesting to  paper script. Pt aware Dr. Layton will be back in office tomorrow, he will await call once its ready.

## 2024-09-06 NOTE — TELEPHONE ENCOUNTER
Prescription faxed with confirmation     Spoke with patient and he states that he no longer needs a hard copy since prescription was faxed    Copy placed in accordion folder and to scan

## 2024-09-09 DIAGNOSIS — E11.69 TYPE 2 DIABETES MELLITUS WITH OBESITY (HCC): ICD-10-CM

## 2024-09-09 DIAGNOSIS — E66.9 TYPE 2 DIABETES MELLITUS WITH OBESITY (HCC): ICD-10-CM

## 2024-09-10 RX ORDER — GLIPIZIDE 5 MG/1
5 TABLET ORAL
Qty: 180 TABLET | Refills: 1 | Status: SHIPPED | OUTPATIENT
Start: 2024-09-10

## 2024-09-10 NOTE — TELEPHONE ENCOUNTER
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)    Last ordered: 9 months ago (12/7/2023) by Arleen Layton MD    Last refill: 4/11/2024    Rx #: [id number]    Diabetes Medication Protocol Qufyoq0409/09/2024 09:28 PM   Protocol Details In person appointment or virtual visit in the past 6 mos or appointment in next 3 mos    Last A1C < 7.5 and within past 6 months    Microalbumin procedure in past 12 months or taking ACE/ARB    EGFRCR or GFRNAA > 50    GFR in the past 12 months      To be filled at: [facility] #[id number] - [place], [place] - [address] [phone], [phone]     LOV:11/06/2023  RTC: 6 months   Labs: 06/19/2024  Last filled:04/11/2024    No future appointments.

## 2024-09-13 ENCOUNTER — PATIENT MESSAGE (OUTPATIENT)
Dept: INTERNAL MEDICINE CLINIC | Facility: CLINIC | Age: 89
End: 2024-09-13

## 2024-09-15 NOTE — TELEPHONE ENCOUNTER
KS: is this appropriate for video visit? Or should pt come in?    Yes - the patient is able to be screened

## 2024-09-15 NOTE — TELEPHONE ENCOUNTER
From: Bhupendra Roman  To: Arleen Layton  Sent: 9/13/2024 12:53 PM CDT  Subject: Prostrate discussion    My urine flow has decreased over the last 2 months. No pain or discoloration. At 89 years of age I am interested in knowing more about a super conservative approach addressing this problem. Can we schedule a virtual visit at your convenience.?

## 2024-09-16 NOTE — TELEPHONE ENCOUNTER
Contacted patient scheduled apt.     Future Appointments   Date Time Provider Department Center   10/4/2024  1:00 PM Arleen Layton MD [facility] [place]

## 2024-10-04 ENCOUNTER — TELEMEDICINE (OUTPATIENT)
Dept: INTERNAL MEDICINE CLINIC | Facility: CLINIC | Age: 89
End: 2024-10-04
Payer: MEDICARE

## 2024-10-04 DIAGNOSIS — E11.69 HYPERLIPIDEMIA DUE TO TYPE 2 DIABETES MELLITUS (HCC): ICD-10-CM

## 2024-10-04 DIAGNOSIS — E11.69 TYPE 2 DIABETES MELLITUS WITH OBESITY (HCC): Primary | ICD-10-CM

## 2024-10-04 DIAGNOSIS — E11.59 HYPERTENSION ASSOCIATED WITH DIABETES (HCC): ICD-10-CM

## 2024-10-04 DIAGNOSIS — E66.9 TYPE 2 DIABETES MELLITUS WITH OBESITY (HCC): Primary | ICD-10-CM

## 2024-10-04 DIAGNOSIS — I15.2 HYPERTENSION ASSOCIATED WITH DIABETES (HCC): ICD-10-CM

## 2024-10-04 DIAGNOSIS — R39.14 BENIGN PROSTATIC HYPERPLASIA WITH INCOMPLETE BLADDER EMPTYING: ICD-10-CM

## 2024-10-04 DIAGNOSIS — E11.22 CONTROLLED TYPE 2 DIABETES MELLITUS WITH STAGE 3 CHRONIC KIDNEY DISEASE, WITHOUT LONG-TERM CURRENT USE OF INSULIN (HCC): ICD-10-CM

## 2024-10-04 DIAGNOSIS — I48.0 PAROXYSMAL ATRIAL FIBRILLATION (HCC): ICD-10-CM

## 2024-10-04 DIAGNOSIS — G47.33 OSA (OBSTRUCTIVE SLEEP APNEA): ICD-10-CM

## 2024-10-04 DIAGNOSIS — N18.30 CONTROLLED TYPE 2 DIABETES MELLITUS WITH STAGE 3 CHRONIC KIDNEY DISEASE, WITHOUT LONG-TERM CURRENT USE OF INSULIN (HCC): ICD-10-CM

## 2024-10-04 DIAGNOSIS — E78.5 HYPERLIPIDEMIA DUE TO TYPE 2 DIABETES MELLITUS (HCC): ICD-10-CM

## 2024-10-04 DIAGNOSIS — N40.1 BENIGN PROSTATIC HYPERPLASIA WITH INCOMPLETE BLADDER EMPTYING: ICD-10-CM

## 2024-10-04 DIAGNOSIS — E03.9 ACQUIRED HYPOTHYROIDISM: ICD-10-CM

## 2024-10-04 RX ORDER — TAMSULOSIN HYDROCHLORIDE 0.4 MG/1
0.8 CAPSULE ORAL DAILY
Qty: 180 CAPSULE | Refills: 3 | Status: SHIPPED | OUTPATIENT
Start: 2024-10-04

## 2024-10-04 NOTE — PATIENT INSTRUCTIONS
You are due for your medicare wellness exam in November, 2024.  Please complete labs a few days prior.    You are due for your diabetic eye exam in December, 2024.     I recommend the following vaccines -  TDAP (tetanus, diptheriae, pertussis) vaccine every 10 years.

## 2024-10-04 NOTE — PROGRESS NOTES
Bhupendra Roman is a 89 year old male.   Virtual Telephone Check-In    This visit is conducted using Telemedicine with live, interactive video and audio.   Patient understands and accepts financial responsibility for any deductible, co-insurance and/or co-pays associated with this service.    Telehealth outside of [facility]  Telehealth Verbal Consent   I conducted a telehealth visit with FLORENTINO on 10/4/2024 which was completed using two-way, real-time interactive audio and video communication. This has been done in good rodger to provide continuity of care in the best interest of the provider-patient relationship, due to the COVID -19 public health crisis/national emergency where restrictions of face-to-face office visits are ongoing. Every conscious effort was taken to allow for sufficient and adequate time to complete the visit.  The patient was made aware of the limitations of the telehealth visit, including treatment limitations as no physical exam could be performed.  The patient was advised to call 911 or to go to the ER in case there was an emergency.  The patient was also advised of the potential privacy & security concerns related to the telehealth platform.   The patient was made aware of where to find [facility]'s notice of privacy practices, telehealth consent form and other related consent forms and documents.  which are located on the [facility] website. The patient verbally agreed to telehealth consent form, related consents and the risks discussed.    Lastly, the patient confirmed that they were in [place].   Included in this visit, time may have been spent reviewing labs, medications, radiology tests and decision making. Appropriate medical decision-making and tests are ordered as detailed in the plan of care above.  Coding/billing information is submitted for this visit based on complexity of care and/or time spent for the visit.  Time spent: 30 minutes      HPI:   Patient presents for the following  Chief Complaint   Patient presents with    Well Woman       History of Present Illness: Carlos Coulter is a 26 y.o. female that presents today 6/23/2017 for well gyn visit.    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; casted B    Bipolar disorder     sees psych    Chronic constipation     Depression     sees psych    Eczema     GERD (gastroesophageal reflux disease)     Growth hormone deficiency     tx with growth hormone ages 12 - 15 by Dr Brewre    Heart murmur     saw cardiology 9/2014, intermittent, asymptomatic    Hemorrhoid     Hypothyroidism     sees Dr Brewer (endo)    Insomnia     Learning disability     NOLAN (obstructive sleep apnea)     does not use CPap    Speech abnormality        Past Surgical History:   Procedure Laterality Date    ADENOIDECTOMY  age 4    FOOT SURGERY  11/13/2014. 3/2015    L foot    FOOT SURGERY Right 10/2014    Right foot surgery    KNEE SURGERY  right/11/2015    KNEE SURGERY Right 03/09/2017    injury to right knee    MULTIPLE TOOTH EXTRACTIONS      MUSCLE RELEASE Bilateral     Lower extremities due to congenital club feet    TYMPANOSTOMY TUBE PLACEMENT  age 4    UPPER GASTROINTESTINAL ENDOSCOPY  05/2016    Dr. Nguyen       Current Outpatient Prescriptions   Medication Sig Dispense Refill    albuterol (PROAIR HFA) 90 mcg/actuation inhaler INHALE 1 PUFF EVERY 4 HOURS PRN for Wheezing 8.5 g 2    albuterol (PROVENTIL) 2.5 mg /3 mL (0.083 %) nebulizer solution Take 3 mLs (2.5 mg total) by nebulization every 6 (six) hours as needed for Wheezing. 1 Box 3    aripiprazole (ABILIFY) 5 MG Tab Take 5 mg by mouth once daily.      clobetasol (TEMOVATE) 0.05 % cream Apply 1 application topically daily as needed.       DILTIAZEM HCL (DILTIAZEM 2%  CREAM) Apply topically 3 (three) times daily. Apply topically to anal area. 30 g 0    docusate sodium (COLACE) 100 MG capsule Take 1 capsule (100 mg total) by mouth 2 (two) times daily. 60 capsule 0    FLONASE ALLERGY RELIEF 50 mcg/actuation nasal spray SHAKE WELL AND USE 2 SPRAYS IN EACH NOSTRIL EVERY DAY 1 Bottle 11    inhalation device (BREATHERITE VALVED MDI SPACER) Use as directed for inhalation. 1 Device 0    levothyroxine (SYNTHROID) 100 MCG tablet Take 1 tablet (100 mcg total) by mouth once daily. 30 tablet 1    lisdexamfetamine (VYVANSE) 40 MG Cap Take 40 mg by mouth once daily.      montelukast (SINGULAIR) 10 mg tablet Take 1 tablet (10 mg total) by mouth once daily. 90 tablet 3    trazodone (DESYREL) 100 MG tablet Take 100 mg by mouth nightly as needed for Insomnia.      triamcinolone acetonide 0.1% (KENALOG) 0.1 % ointment AAA bid x 2 weeks then prn, avoid chronic use 454 g 1     No current facility-administered medications for this visit.        Review of patient's allergies indicates:   Allergen Reactions    Grapefruit Anaphylaxis    Pineapple Anaphylaxis    Augmentin [amoxicillin-pot clavulanate] Hives    Penicillins Hives    Tamiflu [oseltamivir] Diarrhea and Nausea And Vomiting       Family History   Problem Relation Age of Onset    Diabetes Mother     Heart disease Mother     Thyroid disease Mother     Depression Mother     Asthma Mother     Arthritis Mother     Allergic rhinitis Mother     Colon polyps Mother     Depression Father     Migraines Father     Thyroid disease Father     PAVEL disease Father     Early death Brother       1 hour after birth    Thyroid disease Maternal Grandmother     Seizures Maternal Grandmother     Clotting disorder Maternal Grandmother     Pulmonary fibrosis Maternal Grandfather     Colon cancer Maternal Grandfather     Diabetes Paternal Grandmother     Arthritis Paternal Grandmother     Schizophrenia Paternal Grandmother      issues.  DM - he checks his FBS once weekly and it has been 150s over past 3 weeks. Previously they were 120-130s. Denies hypoglycemia.   HTN - checks BP once weekly and they are less than 140/80s  BPH - his urinary stream has decreased over past few months. He also has nocturia every 4 hours. He denies incomplete bladder emptying.   Constipation - metamucil is helping greatly   HENAO - chronic and stable      REVIEW OF SYSTEMS:   GENERAL HEALTH: feels well otherwise. No f/c  NEURO: denies any headaches, LH, dizzyness, LOC, falls  VISION: denies any blurred or double vision  RESPIRATORY: denies  cough, or congestion  CARDIOVASCULAR: denies chest pain, pressure or palpitations  GI: denies abdominal pain, diarrhea, n/v, BRBPR, melena  : no dysuria or hematuria  PSYCH: mood is stable. Denies depression or anxiety.   EXT: mild edema is well controlled with compression stockings    Wt Readings from Last 6 Encounters:   11/06/23 202 lb 3.2 oz (91.7 kg)   05/05/23 201 lb (91.2 kg)   05/02/23 201 lb 6.4 oz (91.4 kg)   04/14/23 213 lb (96.6 kg)   10/31/22 215 lb 9.6 oz (97.8 kg)   03/02/22 221 lb (100.2 kg)       Allergies   Allergen Reactions    Hydralazine OTHER (SEE COMMENTS)     Constipation    Lovastatin MYALGIA    Metformin DIARRHEA       Family History   Problem Relation Age of Onset    Cancer Father     Dementia Father     Cancer Mother     Stroke Brother     Heart Disease Neg     Stroke Neg       Past Medical History:    Anxiety    Arrhythmia    HX AFIB DX 3 YRS AGO    Arthritis    Atrial fibrillation (HCC)    Back problem    middle cage in back     BPH (benign prostatic hyperplasia)    Cataract    Esophageal reflux    HIGH BLOOD PRESSURE    HIGH CHOLESTEROL    Hyperlipidemia    Hypothyroid    Obesity    Obstructive sleep apnea (adult) (pediatric)    AHI 13 SaO2 conrad 78%     Sleep apnea    Type II or unspecified type diabetes mellitus without mention of complication, not stated as uncontrolled    Unspecified  Depression Paternal Grandmother     Early death Paternal Grandfather 54    Aneurysm Paternal Grandfather     Angioedema Neg Hx     Immunodeficiency Neg Hx     Urticaria Neg Hx     Collagen disease Neg Hx        Social History     Social History    Marital status:      Spouse name: N/A    Number of children: N/A    Years of education: N/A     Occupational History    stay at home      Social History Main Topics    Smoking status: Former Smoker     Quit date: 2014    Smokeless tobacco: Never Used    Alcohol use No    Drug use: No    Sexual activity: Yes     Partners: Male     Birth control/ protection: OCP, None     Other Topics Concern    None     Social History Narrative     on 2015; trying to conceive       OB History    Para Term  AB Living   1       1     SAB TAB Ectopic Multiple Live Births   1              # Outcome Date GA Lbr Luis/2nd Weight Sex Delivery Anes PTL Lv   1 SAB                   Review of Symptoms:  GENERAL: Denies weight gain or weight loss. Feeling well overall.   SKIN: Denies rash or lesions.   HEAD: Denies head injury or headache.   NODES: Denies enlarged lymph nodes.   CHEST: Denies chest pain or shortness of breath.   CARDIOVASCULAR: Denies palpitations or left sided chest pain.   ABDOMEN: No abdominal pain, constipation, diarrhea, nausea, vomiting or rectal bleeding.   URINARY: No frequency, dysuria, hematuria, or burning on urination.  HEMATOLOGIC: No easy bruisability or excessive bleeding.   MUSCULOSKELETAL: Denies joint pain or swelling.     /78   Ht 5' (1.524 m)   Wt 77.6 kg (171 lb 1.2 oz)   LMP 2017   Physical Exam:  APPEARANCE: Well nourished, well developed, in no acute distress.  SKIN: Normal skin turgor, no lesions.  NECK: Neck symmetric without masses   RESPIRATORY: Normal respiratory effort with no retractions or use of accessory muscles  CARDIOVASCULAR: Peripheral vascular system with no swelling no  disorder of thyroid    Unspecified essential hypertension    Visual impairment    glasses      Past Surgical History:   Procedure Laterality Date    Back surgery  20004    l3-l4    Back surgery  8/28/14    L3-L5 PPS TLIF     Cataract      Cholecystectomy      \"prior to 1994\"    Colonoscopy  2009    Colonoscopy  3/21/2014    Procedure: COLONOSCOPY;  Surgeon: Kain Brewer MD;  Location:  ENDOSCOPY    Dil urethra stric,male,gen anesth  8/28/14    EDW, JESUSITA    Drain/inject large joint/bursa N/A 4/27/2015    Procedure: LUMBAR EPIDURAL;  Surgeon: Husam Robb MD;  Location: [facility]    Drain/inject large joint/bursa Right 7/7/2015    Procedure: SI JOINT INJECTION;  Surgeon: Husam Robb MD;  Location: [facility]    Echo guide needle biopsy Right 4/7/2015    Procedure: PIRIFORMIS/SCIATIC NERVE INJECTION;  Surgeon: Pancho Lawson MD;  Location: [facility]    Eye surgery      Fluor gid & loclzj ndl/cath spi dx/ther njx N/A 4/27/2015    Procedure: LUMBAR EPIDURAL;  Surgeon: Husam Robb MD;  Location: [facility]    Fluor gid & loclzj ndl/cath spi dx/ther njx N/A 4/7/2015    Procedure: LUMBAR EPIDURAL;  Surgeon: Pancho Lawson MD;  Location: [facility]    Fluoroscopic guidance needle placement N/A 4/27/2015    Procedure: LUMBAR EPIDURAL;  Surgeon: Husam Robb MD;  Location: [facility]    Hip replacement surgery  8/1/2005, 8/15/2008    L in 2005, R in 2008    Inj for sacroiliac jt anesth Right 5/19/2015    Procedure: SI JOINT INJECTION;  Surgeon: Husam Robb MD;  Location: [facility]    Inj for sacroiliac jt anesth Left 6/9/2015    Procedure: SI JOINT INJECTION;  Surgeon: Husam Robb MD;  Location: [facility]    Inj for sacroiliac jt anesth Right 7/7/2015    Procedure: SI JOINT INJECTION;  Surgeon: Husam Robb MD;  Location:  [facility]    Injection, w/wo contrast, dx/therapeutic substance, epidural/subarachnoid; lumbar/sacral N/A 4/27/2015    Procedure: LUMBAR EPIDURAL;  Surgeon: Husam Robb MD;  Location: [facility]    Injection, w/wo contrast, dx/therapeutic substance, epidural/subarachnoid; lumbar/sacral N/A 4/7/2015    Procedure: LUMBAR EPIDURAL;  Surgeon: Pancho Lawson MD;  Location: [facility]    Injection; single/multiple trigger point(s), 1/2 muscle Right 4/7/2015    Procedure: PIRIFORMIS/SCIATIC NERVE INJECTION;  Surgeon: Pancho Lawson MD;  Location: [facility]    Injection; single/multiple trigger point(s), 1/2 muscle Right 8/24/2015    Procedure: PIRIFORMIS/SCIATIC NERVE INJECTION;  Surgeon: Husam Robb MD;  Location: [facility]    Laminectomy,lumbar  10/2003    L3-L4    Other surgical history  4/2013    right lumbar facet steroid injection     Other surgical history  8/28/2014    L3-5 post LIF    Patient documented not to have experienced any of the following events N/A 4/27/2015    Procedure: LUMBAR EPIDURAL;  Surgeon: Husam Robb MD;  Location: [facility]    Patient documented not to have experienced any of the following events  5/19/2015    Procedure: ;  Surgeon: Husam Robb MD;  Location: [facility]    Patient documented not to have experienced any of the following events Left 6/9/2015    Procedure: SI JOINT INJECTION;  Surgeon: Husam Robb MD;  Location: [facility]    Patient documented not to have experienced any of the following events Right 7/7/2015    Procedure: SI JOINT INJECTION;  Surgeon: Husam Robb MD;  Location: [facility]    Patient documented not to have experienced any of the following events Right 4/7/2015    Procedure: PIRIFORMIS/SCIATIC NERVE INJECTION;  Surgeon: Pancho Lawson MD;   varicosities and palpation of pulses normal  LYMPHATIC: No enlargements of the lymph nodes noted in the neck, axillae, or groin  ABDOMEN: Soft. No tenderness or masses. No hepatosplenomegaly. No hernias.  BREASTS: Symmetrical, no skin changes or visible lesions. No palpable masses, nipple discharge or adenopathy bilaterally.  PELVIC: Normal external female genitalia without lesions. Normal hair distribution. Adequate perineal body, normal urethral meatus. Urethra with no masses.  Bladder nontender. Vagina moist and well rugated without lesions or discharge. Cervix pink and without lesions. No significant cystocele or rectocele. Bimanual exam showed uterus normal size, shape, position, mobile and nontender. Adnexa without masses or tenderness. Urethra and bladder normal. PAP DONE  EXTREMITIES: No clubbing cyanosis or edema.    ASSESSMENT/PLAN:  Routine gynecological examination  -     Liquid-based pap smear, screening          Patient was counseled today on Pap guidelines, recommendation for pelvic exams, mammograms every other year after the age of 40 and annually after the age of 50, Colonoscopy after the age of 50, Dexa Bone Scan and calcium and vitamin D supplementation in menopause and to see her PCP for other health maintenance.   FOLLOW-UP:prn   Location: [facility]    Patient documented not to have experienced any of the following events Right 8/24/2015    Procedure: PIRIFORMIS/SCIATIC NERVE INJECTION;  Surgeon: Husam Robb MD;  Location: [facility]    Patient withough preoperative order for iv antibiotic surgical site infection prophylaxis. N/A 4/27/2015    Procedure: LUMBAR EPIDURAL;  Surgeon: Husam Robb MD;  Location: [facility]    Patient withough preoperative order for iv antibiotic surgical site infection prophylaxis.  5/19/2015    Procedure: ;  Surgeon: Husam Robb MD;  Location: [facility]    Patient withough preoperative order for iv antibiotic surgical site infection prophylaxis. Left 6/9/2015    Procedure: SI JOINT INJECTION;  Surgeon: Husam Robb MD;  Location: [facility]    Patient withough preoperative order for iv antibiotic surgical site infection prophylaxis. Right 7/7/2015    Procedure: SI JOINT INJECTION;  Surgeon: Husam Robb MD;  Location: [facility]    Patient withough preoperative order for iv antibiotic surgical site infection prophylaxis. Right 4/7/2015    Procedure: PIRIFORMIS/SCIATIC NERVE INJECTION;  Surgeon: Pancho Lawson MD;  Location: [facility]    Patient withough preoperative order for iv antibiotic surgical site infection prophylaxis. Right 8/24/2015    Procedure: PIRIFORMIS/SCIATIC NERVE INJECTION;  Surgeon: Husam Robb MD;  Location: [facility]    Spine surgery procedure unlisted      Tonsillectomy      Total hip replacement      Vasectomy  1969 ?      Social History:    Social History     Socioeconomic History    Marital status:    Tobacco Use    Smoking status: Former     Current packs/day: 0.00     Average packs/day: 2.0 packs/day for 45.0 years (90.0 ttl pk-yrs)     Types: Cigarettes     Start date: 7/14/1935      Quit date: 1980     Years since quittin.2    Smokeless tobacco: Never   Vaping Use    Vaping status: Never Used   Substance and Sexual Activity    Alcohol use: Yes     Alcohol/week: 2.0 standard drinks of alcohol     Types: 2 Glasses of wine per week    Drug use: Never   Other Topics Concern    Caffeine Concern No    Stress Concern No    Weight Concern No    Special Diet No    Exercise Yes    Seat Belt Yes     Social Determinants of Health      [facility], [facility]    Social Connections    Received from [facility], [facility]    Housing Stability           EXAM:   There were no vitals taken for this visit.  GENERAL: A&O well developed, well nourished,in no apparent distress  HEENT: atraumatic,   LUNGS: speaking clearly and easily in complete sentences  NEURO: facial gestures grossly intact  PSYCH: pleasant    ASSESSMENT AND PLAN:   # Uretrhal Stricture, BPH: he is experiencing weak urinary stream. He prefers to avoid any invasive interventions so increase flomax to 0.8 mg and cont finasteride 5 mg. rapaflo was too costly.   # Afib:  Rate controlled. cont xarelto   # KRISTIAN: using CPAP nightly   # Venous insufficiency of both lower extremities: continue compression stockings and elevation.   # Gait Instability: 2/2 spinal stenosis.   Cont to reinforce fall prevention and HEP.   # Lumbar Spinal Stenosis s/p L3-5 fusion (14) with neurogenic claudication: chronic, pain is stable off any pain medications.  - gabapentin was not effective.   - 2 cortisone injections and 5 trigger point injections did not help. Has undergone chiropracter and PT 12 weeks. Meloxicam, celebrex did not help.  # Hypothyroidism: normal TSH in 2023.. Cont levothyroxine.   # Diabetic Neuropathy in right foot: gabapentin did not help. Well controlled with topical lotion  # CKDz stage 3 in DM - chronic, stable  #  Type 2 DM: At goal in  6/2024 but home sugars are rising so repeat A1C now.  Cont trulicity 1.5 mg weekly and glipizide 7.5 mg daily.   - metformin (diarrhea)  - cont to reinforce lifestyle interventions.   - DM eye exam from Dr. Tucker on 12/29/2023 without diabetic retinopathy in either eye.    - Foot Exam: done 2023   - LDL: see below  - BP: see below  - micro alb:creat done 2024  - Depression Screen: done 2024  - Not on ASA b/c on coumadin  # Macular Degeneration of Right Eye: on injections through Edgar Tucker.   # HTN assoc with DM: well controlled on aldactone, hydrochlorothiazide, and losartan.   - home machine is accurate for systolic values.  - hydralazine (swelling) and norvasc (swelling)  # HLP assoc with DM: LDL is 80 on pravastatin 40mg. Cardiology does not recommend increasing statin dose to achieve LDL less than 70  # GERD: ranitidine was not effective. Needs daily omeprazole for quality of life.   # Health Maintenance: medicare wellness exam 11/6/2023  Colon Cancer Screening: colonoscopy in 3/2014 with hyperplastic and adenomatous polyps. He declines repeating a colonoscopy. FIT testing negative in 2018.   Bone Health: continue dietary calcium/vitD3, fall prevention and weight bearing exercises   Vaccines: up to date with shingrix and pneumonia series. Cont COVID, flu, RSV. Advised on TDAP  Primary is wife. Second is Daughter, Emma Roman is medical POA. Next would be son, Neel.  Full Code, but no prolonged life support.     Care Team:  Dr. Varun Cole (cardiology)  Dr. Gallego (podiatry)        The patient indicates understanding of these issues and agrees to the plan.  The patient is asked to return in 11/2024 for medicare wellness exam.   Arleen Layton MD

## 2024-10-11 ENCOUNTER — LAB ENCOUNTER (OUTPATIENT)
Dept: LAB | Age: 89
End: 2024-10-11
Attending: INTERNAL MEDICINE
Payer: MEDICARE

## 2024-10-11 DIAGNOSIS — E11.69 TYPE 2 DIABETES MELLITUS WITH OBESITY (HCC): ICD-10-CM

## 2024-10-11 DIAGNOSIS — E03.9 ACQUIRED HYPOTHYROIDISM: ICD-10-CM

## 2024-10-11 DIAGNOSIS — E66.9 TYPE 2 DIABETES MELLITUS WITH OBESITY (HCC): ICD-10-CM

## 2024-10-11 LAB
EST. AVERAGE GLUCOSE BLD GHB EST-MCNC: 154 MG/DL (ref 68–126)
HBA1C MFR BLD: 7 % (ref ?–5.7)
T4 FREE SERPL-MCNC: 1.4 NG/DL (ref 0.8–1.7)
TSI SER-ACNC: 4.81 MIU/ML (ref 0.55–4.78)

## 2024-10-11 PROCEDURE — 84439 ASSAY OF FREE THYROXINE: CPT

## 2024-10-11 PROCEDURE — 36415 COLL VENOUS BLD VENIPUNCTURE: CPT

## 2024-10-11 PROCEDURE — 84443 ASSAY THYROID STIM HORMONE: CPT

## 2024-10-11 PROCEDURE — 83036 HEMOGLOBIN GLYCOSYLATED A1C: CPT

## 2024-11-14 DIAGNOSIS — E11.9 DIABETES MELLITUS WITHOUT COMPLICATION (HCC): ICD-10-CM

## 2024-11-14 RX ORDER — SPIRONOLACTONE 25 MG/1
25 TABLET ORAL DAILY
Qty: 90 TABLET | Refills: 3 | Status: SHIPPED | OUTPATIENT
Start: 2024-11-14

## 2024-11-14 NOTE — TELEPHONE ENCOUNTER
spironolactone 25 MG Oral Tab         Sig: Take 1 tablet (25 mg total) by mouth daily.    Disp: 90 tablet    Refills: 3    Start: 11/14/2024    Class: Normal    Non-formulary For: Diabetes mellitus without complication (HCC)    Last ordered: 11 months ago (11/24/2023) by Arleen Layton MD    Hypertension Medications Protocol [id number]/14/2024 11:50 AM   Protocol Details CMP or BMP in past 12 months    Last BP reading less than 140/90    In person appointment or virtual visit in the past 12 mos or appointment in next 3 mos    EGFRCR or GFRNAA > 50      To be filled at: [id number] - [place], [place] - [address] AT [facility] [address], [phone], [phone]     LOV:10/04/2024  RTC:11/2024  Labs:06/19/2024  Last filled:08/20/2024  Future Appointments   Date Time Provider Department Center   12/10/2024  9:00 AM Arleen Layton MD [facility] [place]

## 2024-11-18 ENCOUNTER — PATIENT MESSAGE (OUTPATIENT)
Dept: INTERNAL MEDICINE CLINIC | Facility: CLINIC | Age: 89
End: 2024-11-18

## 2024-11-18 DIAGNOSIS — E78.2 MIXED HYPERLIPIDEMIA: ICD-10-CM

## 2024-11-18 DIAGNOSIS — E11.9 CONTROLLED TYPE 2 DIABETES MELLITUS WITHOUT COMPLICATION, WITHOUT LONG-TERM CURRENT USE OF INSULIN (HCC): ICD-10-CM

## 2024-11-18 DIAGNOSIS — E03.9 HYPOTHYROIDISM, UNSPECIFIED TYPE: ICD-10-CM

## 2024-11-18 DIAGNOSIS — I10 ESSENTIAL HYPERTENSION: ICD-10-CM

## 2024-11-18 DIAGNOSIS — Z00.00 ROUTINE GENERAL MEDICAL EXAMINATION AT A HEALTH CARE FACILITY: ICD-10-CM

## 2024-11-18 DIAGNOSIS — M48.062 SPINAL STENOSIS OF LUMBAR REGION WITH NEUROGENIC CLAUDICATION: ICD-10-CM

## 2024-11-18 DIAGNOSIS — R26.89 NEED FOR ASSISTANCE DUE TO UNSTEADY GAIT: ICD-10-CM

## 2024-11-18 DIAGNOSIS — I48.91 ATRIAL FIBRILLATION, UNSPECIFIED TYPE (HCC): ICD-10-CM

## 2024-11-18 RX ORDER — LEVOTHYROXINE SODIUM 88 UG/1
88 TABLET ORAL
Qty: 90 TABLET | Refills: 3 | Status: CANCELLED | OUTPATIENT
Start: 2024-11-18

## 2024-11-19 RX ORDER — LEVOTHYROXINE SODIUM 88 UG/1
88 TABLET ORAL
Qty: 90 TABLET | Refills: 1 | Status: SHIPPED | OUTPATIENT
Start: 2024-11-19

## 2024-11-19 NOTE — TELEPHONE ENCOUNTER
Spoke to patient when he called to follow up on refill. Let him know when Dex sent us the original request it was only for the Spironolactone and not the Levothyroxine. Let patient know the request was sent to [place]. Pt is without the medication. I told him it was sent as a high priority and we will get it sent as soon as we can. Let the patient know for future refills he is welcome to use a MCM or to call the office so this way we can make sure all needed medications are being refilled.

## 2024-11-19 NOTE — TELEPHONE ENCOUNTER
levothyroxine 88 MCG Oral Tab          Sig: Take 1 tablet (88 mcg total) by mouth before breakfast.    Disp: 90 tablet    Refills: 3    Start: 11/18/2024    Class: Normal    Non-formulary For: Hypothyroidism, unspecified type; Spinal stenosis of lumbar region with neurogenic claudication; Need for assistance due to unsteady gait; Routine general medical examination at a health care facility; Controlled type 2 diabetes mellitus without complication, without long-term current use of insulin (HCC); Mixed hyperlipidemia; Essential hypertension; Atrial fibrillation, unspecified type (HCC)    Last ordered: 1 year ago (11/7/2023) by Arleen Layton MD    Thyroid Medication Protocol [id number]/18/2024 01:40 PM   Protocol Details Last TSH value is normal    TSH in past 12 months    In person appointment or virtual visit in the past 12 mos or appointment in next 3 mos      To be filled at: [id number] - [place], [place] - [address] AT [facility] [address], [phone], [phone]     LOV:10/04/2024  RTC:1 month   Labs:10/11/2024  Last filled:08/20/2024  Future Appointments   Date Time Provider Department Center   12/10/2024  9:00 AM Arleen Layton MD [facility] [place]

## 2024-12-10 ENCOUNTER — OFFICE VISIT (OUTPATIENT)
Dept: INTERNAL MEDICINE CLINIC | Facility: CLINIC | Age: 89
End: 2024-12-10
Payer: MEDICARE

## 2024-12-10 VITALS
DIASTOLIC BLOOD PRESSURE: 60 MMHG | OXYGEN SATURATION: 99 % | SYSTOLIC BLOOD PRESSURE: 120 MMHG | WEIGHT: 211 LBS | TEMPERATURE: 98 F | HEART RATE: 70 BPM | BODY MASS INDEX: 31.61 KG/M2 | HEIGHT: 68.5 IN

## 2024-12-10 DIAGNOSIS — R39.14 BENIGN PROSTATIC HYPERPLASIA WITH INCOMPLETE BLADDER EMPTYING: ICD-10-CM

## 2024-12-10 DIAGNOSIS — N40.1 BENIGN PROSTATIC HYPERPLASIA WITH INCOMPLETE BLADDER EMPTYING: ICD-10-CM

## 2024-12-10 DIAGNOSIS — E11.69 TYPE 2 DIABETES MELLITUS WITH OBESITY (HCC): ICD-10-CM

## 2024-12-10 DIAGNOSIS — I48.0 PAROXYSMAL ATRIAL FIBRILLATION (HCC): ICD-10-CM

## 2024-12-10 DIAGNOSIS — E78.5 HYPERLIPIDEMIA DUE TO TYPE 2 DIABETES MELLITUS (HCC): ICD-10-CM

## 2024-12-10 DIAGNOSIS — N18.30 CONTROLLED TYPE 2 DIABETES MELLITUS WITH STAGE 3 CHRONIC KIDNEY DISEASE, WITHOUT LONG-TERM CURRENT USE OF INSULIN (HCC): ICD-10-CM

## 2024-12-10 DIAGNOSIS — E11.59 HYPERTENSION ASSOCIATED WITH DIABETES (HCC): ICD-10-CM

## 2024-12-10 DIAGNOSIS — E11.22 CONTROLLED TYPE 2 DIABETES MELLITUS WITH STAGE 3 CHRONIC KIDNEY DISEASE, WITHOUT LONG-TERM CURRENT USE OF INSULIN (HCC): ICD-10-CM

## 2024-12-10 DIAGNOSIS — E66.9 TYPE 2 DIABETES MELLITUS WITH OBESITY (HCC): ICD-10-CM

## 2024-12-10 DIAGNOSIS — E03.9 ACQUIRED HYPOTHYROIDISM: ICD-10-CM

## 2024-12-10 DIAGNOSIS — H35.3211 EXUDATIVE AGE-RELATED MACULAR DEGENERATION, RIGHT EYE, WITH ACTIVE CHOROIDAL NEOVASCULARIZATION (HCC): ICD-10-CM

## 2024-12-10 DIAGNOSIS — K21.00 GASTROESOPHAGEAL REFLUX DISEASE WITH ESOPHAGITIS WITHOUT HEMORRHAGE: ICD-10-CM

## 2024-12-10 DIAGNOSIS — Z00.00 ROUTINE GENERAL MEDICAL EXAMINATION AT A HEALTH CARE FACILITY: Primary | ICD-10-CM

## 2024-12-10 DIAGNOSIS — M48.062 SPINAL STENOSIS OF LUMBAR REGION WITH NEUROGENIC CLAUDICATION: ICD-10-CM

## 2024-12-10 DIAGNOSIS — I15.2 HYPERTENSION ASSOCIATED WITH DIABETES (HCC): ICD-10-CM

## 2024-12-10 DIAGNOSIS — E11.69 HYPERLIPIDEMIA DUE TO TYPE 2 DIABETES MELLITUS (HCC): ICD-10-CM

## 2024-12-10 DIAGNOSIS — G47.33 OSA ON CPAP: ICD-10-CM

## 2024-12-10 PROCEDURE — G0439 PPPS, SUBSEQ VISIT: HCPCS | Performed by: INTERNAL MEDICINE

## 2024-12-10 PROCEDURE — 99214 OFFICE O/P EST MOD 30 MIN: CPT | Performed by: INTERNAL MEDICINE

## 2024-12-10 RX ORDER — FINASTERIDE 5 MG/1
5 TABLET, FILM COATED ORAL DAILY
Qty: 90 TABLET | Refills: 3 | Status: SHIPPED | OUTPATIENT
Start: 2024-12-10

## 2024-12-10 RX ORDER — DULAGLUTIDE 1.5 MG/.5ML
INJECTION, SOLUTION SUBCUTANEOUS
COMMUNITY
Start: 2024-11-22

## 2024-12-10 NOTE — PATIENT INSTRUCTIONS
You last received a TDAP vaccine in 2013 so we do recommend repeating one now.     Please complete your labs and urine testing in March, 2025. You do not need to fast.

## 2024-12-10 NOTE — PROGRESS NOTES
HPI:   Patient presents for the following issues and medicare wellness exam.  Comes in with his wife.   BPH - we increased flomax to 2 tablets at last VV. His symptoms are stable.   DM - FBS once weekly are less than 140s average. Denies symptoms of hypoglycemia  HTN - his home pressures once weekly are less than 140/80s.   Afib - tolerating xarelto  KRISTIAN - he is consistent about waering it 4 +hours/night. He often does not feel well rested in mornings. He takes a 1 hour nap every afternoon and he feels refreshed after this. He does not wear cpap during daytime naps.   CADz - denies chest pain.   DM eye exam  - due  HENAO - however, one deep breath helps to resolve it. Particulatly notices it while doing any activity, climbing stairs. It is not consistent. No issues while inside the house. More when doing yard work. Not associated chest pain or palpitations. He feels symptoms are stable over past 6 months but worse compared to 1 year ago   Goals of care - he is independent of ADLs. He is managing their finances and driving. He has supportive family     REVIEW OF SYSTEMS:   GENERAL HEALTH: feels well otherwise. No f/c  NEURO: denies any headaches, LH, dizzyness, LOC, falls  VISION: denies any blurred or double vision. He has macular degeneration and follows with Dr. Tucker every 8 weeks.   RESPIRATORY: denies shortness of breath, cough, or congestion  CARDIOVASCULAR: denies chest pain, pressure or palpitations  GI: denies abdominal pain, diarrhea, n/v, BRBPR, melena. Constipation is well managed with otc meds  : no dysuria or hematuria  SKIN: has melanoma of his nose and following with dermatology  PSYCH: mood is stable. Denies depression or anxiety.   EXT: chronic edema is stable    Wt Readings from Last 6 Encounters:   11/06/23 202 lb 3.2 oz (91.7 kg)   05/05/23 201 lb (91.2 kg)   05/02/23 201 lb 6.4 oz (91.4 kg)   04/14/23 213 lb (96.6 kg)   10/31/22 215 lb 9.6 oz (97.8 kg)   03/02/22 221 lb (100.2 kg)        Allergies   Allergen Reactions    Hydralazine OTHER (SEE COMMENTS)     Constipation    Lovastatin MYALGIA    Metformin DIARRHEA       Family History   Problem Relation Age of Onset    Cancer Father     Dementia Father     Cancer Mother     Stroke Brother     Heart Disease Neg     Stroke Neg       Past Medical History:    Anxiety    Arrhythmia    HX AFIB DX 3 YRS AGO    Arthritis    Atrial fibrillation (HCC)    Back problem    middle cage in back     BPH (benign prostatic hyperplasia)    Cataract    Esophageal reflux    HIGH BLOOD PRESSURE    HIGH CHOLESTEROL    Hyperlipidemia    Hypothyroid    Obesity    Obstructive sleep apnea (adult) (pediatric)    AHI 13 SaO2 conrad 78%     Sleep apnea    Type II or unspecified type diabetes mellitus without mention of complication, not stated as uncontrolled    Unspecified disorder of thyroid    Unspecified essential hypertension    Visual impairment    glasses      Past Surgical History:   Procedure Laterality Date    Back surgery  20004    l3-l4    Back surgery  8/28/14    L3-L5 PPS TLIF     Cataract      Cholecystectomy      \"prior to 1994\"    Colonoscopy  2009    Colonoscopy  3/21/2014    Procedure: COLONOSCOPY;  Surgeon: Kain Brewer MD;  Location:  ENDOSCOPY    Dil urethra stric,male,gen anesth  8/28/14    EDW, JESUSITA    Drain/inject large joint/bursa N/A 4/27/2015    Procedure: LUMBAR EPIDURAL;  Surgeon: Husam Robb MD;  Location: [facility]    Drain/inject large joint/bursa Right 7/7/2015    Procedure: SI JOINT INJECTION;  Surgeon: Husam Robb MD;  Location: [facility]IN [facility]    Echo guide needle biopsy Right 4/7/2015    Procedure: PIRIFORMIS/SCIATIC NERVE INJECTION;  Surgeon: Pancho Lawson MD;  Location: [facility]    Eye surgery      Fluor gid & loclzj ndl/cath spi dx/ther njx N/A 4/27/2015    Procedure: LUMBAR EPIDURAL;  Surgeon: Husam Robb MD;  Location: [facility] PAIN MANAGEMENT     Fluor gid & loclzj ndl/cath spi dx/ther njx N/A 4/7/2015    Procedure: LUMBAR EPIDURAL;  Surgeon: Pancho Lawson MD;  Location: [facility]    Fluoroscopic guidance needle placement N/A 4/27/2015    Procedure: LUMBAR EPIDURAL;  Surgeon: Husam Robb MD;  Location: [facility]    Hip replacement surgery  8/1/2005, 8/15/2008    L in 2005, R in 2008    Inj for sacroiliac jt anesth Right 5/19/2015    Procedure: SI JOINT INJECTION;  Surgeon: Husam Robb MD;  Location: [facility]    Inj for sacroiliac jt anesth Left 6/9/2015    Procedure: SI JOINT INJECTION;  Surgeon: Husam Robb MD;  Location: [facility]    Inj for sacroiliac jt anesth Right 7/7/2015    Procedure: SI JOINT INJECTION;  Surgeon: Husam Robb MD;  Location: [facility]    Injection, w/wo contrast, dx/therapeutic substance, epidural/subarachnoid; lumbar/sacral N/A 4/27/2015    Procedure: LUMBAR EPIDURAL;  Surgeon: Husam Robb MD;  Location: [facility]    Injection, w/wo contrast, dx/therapeutic substance, epidural/subarachnoid; lumbar/sacral N/A 4/7/2015    Procedure: LUMBAR EPIDURAL;  Surgeon: Pancho Lawson MD;  Location: [facility]    Injection; single/multiple trigger point(s), 1/2 muscle Right 4/7/2015    Procedure: PIRIFORMIS/SCIATIC NERVE INJECTION;  Surgeon: Pancho Lawson MD;  Location: [facility]    Injection; single/multiple trigger point(s), 1/2 muscle Right 8/24/2015    Procedure: PIRIFORMIS/SCIATIC NERVE INJECTION;  Surgeon: Husam Robb MD;  Location: [facility]    Laminectomy,lumbar  10/2003    L3-L4    Other surgical history  4/2013    right lumbar facet steroid injection     Other surgical history  8/28/2014    L3-5 post LIF    Patient documented not to have experienced any of the following events N/A 4/27/2015    Procedure: LUMBAR  EPIDURAL;  Surgeon: Husam Robb MD;  Location: [facility]    Patient documented not to have experienced any of the following events  5/19/2015    Procedure: ;  Surgeon: Husam Robb MD;  Location: [facility]    Patient documented not to have experienced any of the following events Left 6/9/2015    Procedure: SI JOINT INJECTION;  Surgeon: Husam Robb MD;  Location: [facility]    Patient documented not to have experienced any of the following events Right 7/7/2015    Procedure: SI JOINT INJECTION;  Surgeon: Husam Robb MD;  Location: [facility]    Patient documented not to have experienced any of the following events Right 4/7/2015    Procedure: PIRIFORMIS/SCIATIC NERVE INJECTION;  Surgeon: Pancho Lawson MD;  Location: [facility]    Patient documented not to have experienced any of the following events Right 8/24/2015    Procedure: PIRIFORMIS/SCIATIC NERVE INJECTION;  Surgeon: Husam Robb MD;  Location: [facility]    Patient withough preoperative order for iv antibiotic surgical site infection prophylaxis. N/A 4/27/2015    Procedure: LUMBAR EPIDURAL;  Surgeon: Husam Robb MD;  Location: [facility]    Patient withough preoperative order for iv antibiotic surgical site infection prophylaxis.  5/19/2015    Procedure: ;  Surgeon: Husam Robb MD;  Location: [facility]    Patient withough preoperative order for iv antibiotic surgical site infection prophylaxis. Left 6/9/2015    Procedure: SI JOINT INJECTION;  Surgeon: Husam Robb MD;  Location: [facility]    Patient withough preoperative order for iv antibiotic surgical site infection prophylaxis. Right 7/7/2015    Procedure: SI JOINT INJECTION;  Surgeon: Husam Robb MD;  Location: [facility]    Patient withough  preoperative order for iv antibiotic surgical site infection prophylaxis. Right 2015    Procedure: PIRIFORMIS/SCIATIC NERVE INJECTION;  Surgeon: Pancho Lawson MD;  Location: [facility]    Patient withough preoperative order for iv antibiotic surgical site infection prophylaxis. Right 2015    Procedure: PIRIFORMIS/SCIATIC NERVE INJECTION;  Surgeon: Husam Robb MD;  Location: [facility]    Spine surgery procedure unlisted      Tonsillectomy      Total hip replacement      Vasectomy   ?      Social History:    Social History     Socioeconomic History    Marital status:    Tobacco Use    Smoking status: Former     Current packs/day: 0.00     Average packs/day: 2.0 packs/day for 45.0 years (90.0 ttl pk-yrs)     Types: Cigarettes     Start date: 1935     Quit date: 1980     Years since quittin.4    Smokeless tobacco: Never   Vaping Use    Vaping status: Never Used   Substance and Sexual Activity    Alcohol use: Yes     Alcohol/week: 2.0 standard drinks of alcohol     Types: 2 Glasses of wine per week    Drug use: Never   Other Topics Concern    Caffeine Concern No    Stress Concern No    Weight Concern No    Special Diet No    Exercise Yes    Seat Belt Yes     Social Drivers of Health      [facility]    Social Connections    [facility]    Housing Stability           EXAM:   /60 (BP Location: Right arm, Patient Position: Sitting, Cuff Size: large)   Pulse 70   Temp 97.6 °F (36.4 °C) (Temporal)   Ht 5' 8.5\" (1.74 m)   Wt 211 lb (95.7 kg)   SpO2 99%   BMI 31.62 kg/m²   GENERAL: A&O well developed, well nourished,in no apparent distress  SKIN: no rashes,no suspicious lesions  HEENT: atraumatic, MMM, throat is clear  NECK: supple, no jvd, no thyromegaly  LUNGS: clear to auscultation bilateraly, no  c/w/r  CARDIO: RRR without g/m/r  GI: soft non tender nondistended no hsm bs throughout  NEURO: CN 2-12 grossly intact  PSYCH: pleasant  EXTREMITIES: no cyanosis, clubbing, 1+ pitting edema bilateral LEs  Bilateral barefoot skin diabetic exam is normal, visualized feet and the appearance is normal.  Bilateral monofilament/sensation of both feet is normal of right foot. Has diminished sensation of left foot  Pulsation pedal pulse exam of both lower legs/feet is normal as well.      ASSESSMENT AND PLAN:   # HENAO - resolves very quickly. Recent TTE was stable but he does have mild aortic stenosis and diastlic d/f. I offered pulmonary evaluation but he wants to hold off for now.   # Uretrhal Stricture, BPH: stable, cont flomax 0.8 mg and cont finasteride 5 mg. rapaflo was too costly.   # Afib:  Rate controlled. cont xarelto   # KRISTIAN: using CPAP nightly but does not sound like his sleep quality is good, needs to re-establish with sleep medicine.   # Venous insufficiency of both lower extremities: continue compression stockings and elevation.   # Gait Instability: 2/2 spinal stenosis.   Cont to reinforce fall prevention and HEP.   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  - gabapentin was not effective.   - 2 cortisone injections and 5 trigger point injections did not help. Has undergone chiropracter and PT 12 weeks. Meloxicam, celebrex did not help.  # Hypothyroidism: normal TSH near normal in 11/2024.  Cont levothyroxine.   # Diabetic Neuropathy in right foot: gabapentin did not help. Well controlled with topical lotion  # CKDz stage 3 in DM - chronic, stable  #  Type 2 DM: at goal in 11/2024. Cont trulicity 1.5 mg weekly and glipizide 7.5 mg daily.   - metformin (diarrhea)  - cont to reinforce lifestyle interventions.   - DM eye exam from Dr. Tucker on 12/29/2023 without diabetic retinopathy in either eye.    - Foot Exam: done  2024  - LDL: see below  - BP: see below  - micro alb:creat done 2024  - Depression Screen: done 2024  - Not on ASA b/c on coumadin  # Macular Degeneration of Right Eye: on injections through Edgar Tucker.   # HTN assoc with DM: well controlled on aldactone, hydrochlorothiazide, and losartan.   - home machine is accurate for systolic values.  - hydralazine (swelling) and norvasc (swelling)  # HLP assoc with DM: LDL is 80 on pravastatin 40mg. Cardiology does not recommend increasing statin dose to achieve LDL less than 70  # GERD: ranitidine was not effective. Needs daily omeprazole for quality of life.   # Health Maintenance: medicare wellness exam 12/2024  Colon Cancer Screening: colonoscopy in 3/2014 with hyperplastic and adenomatous polyps. He declines repeating a colonoscopy. FIT testing negative in 2018.   Bone Health: continue dietary calcium/vitD3, fall prevention and weight bearing exercises   Vaccines: up to date with shingrix and pneumonia series. Cont COVID, flu, RSV. Advised on TDAP  Primary is wife. Second is Daughter, Emma Roman is medical POA. Next would be sonNeel.  Full Code, but no prolonged life support.     Care Team:  Dr. Varun Cole (cardiology)  Dr. Gallego (podiatry)        The patient indicates understanding of these issues and agrees to the plan.  The patient is asked to return in 1 year for medicare wellness exam  Arleen Layton MD

## 2024-12-26 RX ORDER — HYDROCHLOROTHIAZIDE 25 MG/1
25 TABLET ORAL DAILY
Qty: 90 TABLET | Refills: 0 | Status: SHIPPED | OUTPATIENT
Start: 2024-12-26

## 2024-12-30 RX ORDER — BLOOD SUGAR DIAGNOSTIC
STRIP MISCELLANEOUS
Qty: 100 STRIP | Refills: 0 | Status: SHIPPED | OUTPATIENT
Start: 2024-12-30

## 2024-12-30 NOTE — TELEPHONE ENCOUNTER
Name from pharmacy: Precision Xtra Blood Glucose Test Strips [facility]         Will file in chart as: PRECISION XTRA BLOOD GLUCOSE In Vitro Strip    Sig: Use to check blood sugar once daily    Disp: Not specified (Pharmacy requested: 100 Undefined)    Refills: 0    Start: 12/28/2024    Class: Normal    Non-formulary    Last ordered: 11 months ago (1/16/2024) by Arleen Layton MD    Last refill: 12/29/2021    Rx #: [id number]    Diabetic Supplies Protocol [id number]/28/2024 11:10 AM   Protocol Details In person appointment or virtual visit in the past 12 mos or appointment in next 3 mos      To be filled at: [id number] - [place], [place] - [address] [phone], [phone]     LOV:12/10/2024  RTC:1 year   Labs:n/a   Last filled:12/29/2024  No future appointments.

## 2025-01-08 ENCOUNTER — TELEPHONE (OUTPATIENT)
Dept: INTERNAL MEDICINE CLINIC | Facility: CLINIC | Age: OVER 89
End: 2025-01-08

## 2025-01-08 NOTE — TELEPHONE ENCOUNTER
Completed and signed medicare new prescription order for diabetic testing supplies faxed to [place]   Confirmation received   Sent to scan and copy placed in accordion

## 2025-01-15 NOTE — TELEPHONE ENCOUNTER
Name from pharmacy: TRULICITY 1.5MG/0.5ML INJ (4 PENS)         Will file in chart as: TRULICITY 1.5 MG/0.5ML Subcutaneous Solution Auto-injector    Sig: ADMINISTER 1.5 MG UNDER THE SKIN 1 TIME A WEEK. REPLACES 0.75 MG DOSE    Disp: 6 mL    Refills: 0 (Pharmacy requested: Not specified)    Start: 1/14/2025    Class: Normal    Non-formulary    Last ordered: 1 month ago (12/10/2024) by Reported External/Patient    Last refill: 12/17/2024    Rx #: [id number]    Diabetes Medication Protocol [id number]/14/2025 01:57 PM   Protocol Details Last A1C < 7.5 and within past 6 months    In person appointment or virtual visit in the past 6 mos or appointment in next 3 mos    Microalbumin procedure in past 12 months or taking ACE/ARB    EGFRCR or GFRNAA > 50    GFR in the past 12 months    Medication is active on med list      To be filled at: [id number] - [place] [address] [facility] [address], [phone], [phone]     LOV:12/10/2024  RTC: 1 year   Labs:06/19/2024  Last filled: 12/17/2024  No future appointments.

## 2025-01-16 RX ORDER — DULAGLUTIDE 1.5 MG/.5ML
1.5 INJECTION, SOLUTION SUBCUTANEOUS WEEKLY
Qty: 6 ML | Refills: 3 | Status: SHIPPED | OUTPATIENT
Start: 2025-01-16

## 2025-02-26 RX ORDER — LOSARTAN POTASSIUM 100 MG/1
100 TABLET ORAL DAILY
Qty: 90 TABLET | Refills: 2 | Status: SHIPPED | OUTPATIENT
Start: 2025-02-26

## 2025-02-26 NOTE — TELEPHONE ENCOUNTER
Name from pharmacy: LOSARTAN 100MG TABLETS          Will file in chart as: LOSARTAN 100 MG Oral Tab    Sig: TAKE 1 TABLET(100 MG) BY MOUTH DAILY    Disp: 90 tablet    Refills: 2 (Pharmacy requested: Not specified)    Start: 2/24/2025    Class: Normal    Non-formulary    Last ordered: 9 months ago (5/28/2024) by Arleen Layton MD    Last refill: 11/25/2024    Rx #: [id number]    Hypertension Medications Protocol [id number]/24/2025 07:26 AM   Protocol Details CMP or BMP in past 12 months    Last BP reading less than 140/90    In person appointment or virtual visit in the past 12 mos or appointment in next 3 mos    EGFRCR or GFRNAA > 50    Medication is active on med list      To be filled at: [id number] - [place] - [address] AT [facility] [address], [phone], [phone]     LOV:12/10/2024  RTC:1 year   Labs:06/19/2024  Last filled:11/25/2024  No future appointments.

## 2025-03-17 RX ORDER — OMEPRAZOLE 20 MG/1
20 CAPSULE, DELAYED RELEASE ORAL EVERY MORNING
Qty: 90 CAPSULE | Refills: 3 | Status: SHIPPED | OUTPATIENT
Start: 2025-03-17

## 2025-03-17 NOTE — TELEPHONE ENCOUNTER
Protocol passed     LOV: 12/10/24   RTC: 1yr  Filled: 12/26/24 #90   Labs: 6/19/24   No future appointments.

## 2025-03-27 RX ORDER — HYDROCHLOROTHIAZIDE 25 MG/1
25 TABLET ORAL DAILY
Qty: 90 TABLET | Refills: 0 | OUTPATIENT
Start: 2025-03-27

## 2025-03-27 RX ORDER — HYDROCHLOROTHIAZIDE 25 MG/1
25 TABLET ORAL DAILY
Qty: 90 TABLET | Refills: 0 | Status: SHIPPED | OUTPATIENT
Start: 2025-03-27

## 2025-03-27 NOTE — TELEPHONE ENCOUNTER
Protocol passed     LOV: 12/10/24   RTC: 1 yr   Filled: 12/26/24 #90   Labs: 6/19/24   No future appointments.

## 2025-04-15 ENCOUNTER — PATIENT MESSAGE (OUTPATIENT)
Dept: INTERNAL MEDICINE CLINIC | Facility: CLINIC | Age: OVER 89
End: 2025-04-15

## 2025-04-17 RX ORDER — UMECLIDINIUM 62.5 UG/1
1 AEROSOL, POWDER ORAL DAILY
COMMUNITY
Start: 2025-03-31

## 2025-04-18 ENCOUNTER — LAB ENCOUNTER (OUTPATIENT)
Dept: LAB | Age: OVER 89
End: 2025-04-18
Attending: INTERNAL MEDICINE
Payer: MEDICARE

## 2025-04-18 DIAGNOSIS — E11.59 HYPERTENSION ASSOCIATED WITH DIABETES (HCC): ICD-10-CM

## 2025-04-18 DIAGNOSIS — E03.9 ACQUIRED HYPOTHYROIDISM: ICD-10-CM

## 2025-04-18 DIAGNOSIS — I48.0 PAROXYSMAL ATRIAL FIBRILLATION (HCC): ICD-10-CM

## 2025-04-18 DIAGNOSIS — E78.5 HYPERLIPIDEMIA DUE TO TYPE 2 DIABETES MELLITUS (HCC): ICD-10-CM

## 2025-04-18 DIAGNOSIS — R39.14 BENIGN PROSTATIC HYPERPLASIA WITH INCOMPLETE BLADDER EMPTYING: ICD-10-CM

## 2025-04-18 DIAGNOSIS — M48.062 SPINAL STENOSIS OF LUMBAR REGION WITH NEUROGENIC CLAUDICATION: ICD-10-CM

## 2025-04-18 DIAGNOSIS — D64.9 NORMOCYTIC ANEMIA: ICD-10-CM

## 2025-04-18 DIAGNOSIS — N40.1 BENIGN PROSTATIC HYPERPLASIA WITH INCOMPLETE BLADDER EMPTYING: ICD-10-CM

## 2025-04-18 DIAGNOSIS — N18.30 CONTROLLED TYPE 2 DIABETES MELLITUS WITH STAGE 3 CHRONIC KIDNEY DISEASE, WITHOUT LONG-TERM CURRENT USE OF INSULIN (HCC): ICD-10-CM

## 2025-04-18 DIAGNOSIS — E11.69 TYPE 2 DIABETES MELLITUS WITH OBESITY (HCC): ICD-10-CM

## 2025-04-18 DIAGNOSIS — K21.00 GASTROESOPHAGEAL REFLUX DISEASE WITH ESOPHAGITIS WITHOUT HEMORRHAGE: ICD-10-CM

## 2025-04-18 DIAGNOSIS — Z00.00 ROUTINE GENERAL MEDICAL EXAMINATION AT A HEALTH CARE FACILITY: ICD-10-CM

## 2025-04-18 DIAGNOSIS — G47.33 OSA ON CPAP: ICD-10-CM

## 2025-04-18 DIAGNOSIS — E11.69 HYPERLIPIDEMIA DUE TO TYPE 2 DIABETES MELLITUS (HCC): ICD-10-CM

## 2025-04-18 DIAGNOSIS — I15.2 HYPERTENSION ASSOCIATED WITH DIABETES (HCC): ICD-10-CM

## 2025-04-18 DIAGNOSIS — E66.9 TYPE 2 DIABETES MELLITUS WITH OBESITY (HCC): ICD-10-CM

## 2025-04-18 DIAGNOSIS — E11.22 CONTROLLED TYPE 2 DIABETES MELLITUS WITH STAGE 3 CHRONIC KIDNEY DISEASE, WITHOUT LONG-TERM CURRENT USE OF INSULIN (HCC): ICD-10-CM

## 2025-04-18 LAB
ALT SERPL-CCNC: 24 U/L (ref 10–49)
ANION GAP SERPL CALC-SCNC: 11 MMOL/L (ref 0–18)
AST SERPL-CCNC: 30 U/L (ref ?–34)
BASOPHILS # BLD AUTO: 0.05 X10(3) UL (ref 0–0.2)
BASOPHILS NFR BLD AUTO: 0.8 %
BUN BLD-MCNC: 31 MG/DL (ref 9–23)
CALCIUM BLD-MCNC: 10.2 MG/DL (ref 8.7–10.6)
CHLORIDE SERPL-SCNC: 103 MMOL/L (ref 98–112)
CO2 SERPL-SCNC: 24 MMOL/L (ref 21–32)
CREAT BLD-MCNC: 1.42 MG/DL (ref 0.7–1.3)
CREAT UR-SCNC: 88.1 MG/DL
EGFRCR SERPLBLD CKD-EPI 2021: 47 ML/MIN/1.73M2 (ref 60–?)
EOSINOPHIL # BLD AUTO: 0.16 X10(3) UL (ref 0–0.7)
EOSINOPHIL NFR BLD AUTO: 2.4 %
ERYTHROCYTE [DISTWIDTH] IN BLOOD BY AUTOMATED COUNT: 13.2 %
EST. AVERAGE GLUCOSE BLD GHB EST-MCNC: 157 MG/DL (ref 68–126)
FASTING STATUS PATIENT QL REPORTED: YES
GLUCOSE BLD-MCNC: 151 MG/DL (ref 70–99)
HBA1C MFR BLD: 7.1 % (ref ?–5.7)
HCT VFR BLD AUTO: 34.4 % (ref 39–53)
HGB BLD-MCNC: 11.2 G/DL (ref 13–17.5)
IMM GRANULOCYTES # BLD AUTO: 0.04 X10(3) UL (ref 0–1)
IMM GRANULOCYTES NFR BLD: 0.6 %
LYMPHOCYTES # BLD AUTO: 0.96 X10(3) UL (ref 1–4)
LYMPHOCYTES NFR BLD AUTO: 14.7 %
MCH RBC QN AUTO: 30.9 PG (ref 26–34)
MCHC RBC AUTO-ENTMCNC: 32.6 G/DL (ref 31–37)
MCV RBC AUTO: 95 FL (ref 80–100)
MICROALBUMIN UR-MCNC: <0.3 MG/DL
MONOCYTES # BLD AUTO: 0.46 X10(3) UL (ref 0.1–1)
MONOCYTES NFR BLD AUTO: 7 %
NEUTROPHILS # BLD AUTO: 4.87 X10 (3) UL (ref 1.5–7.7)
NEUTROPHILS # BLD AUTO: 4.87 X10(3) UL (ref 1.5–7.7)
NEUTROPHILS NFR BLD AUTO: 74.5 %
OSMOLALITY SERPL CALC.SUM OF ELEC: 295 MOSM/KG (ref 275–295)
PLATELET # BLD AUTO: 183 10(3)UL (ref 150–450)
POTASSIUM SERPL-SCNC: 4.3 MMOL/L (ref 3.5–5.1)
RBC # BLD AUTO: 3.62 X10(6)UL (ref 3.8–5.8)
SODIUM SERPL-SCNC: 138 MMOL/L (ref 136–145)
T4 FREE SERPL-MCNC: 1.3 NG/DL (ref 0.8–1.7)
TSI SER-ACNC: 4.12 UIU/ML (ref 0.55–4.78)
WBC # BLD AUTO: 6.5 X10(3) UL (ref 4–11)

## 2025-04-18 PROCEDURE — 84443 ASSAY THYROID STIM HORMONE: CPT

## 2025-04-18 PROCEDURE — 85025 COMPLETE CBC W/AUTO DIFF WBC: CPT

## 2025-04-18 PROCEDURE — 84460 ALANINE AMINO (ALT) (SGPT): CPT

## 2025-04-18 PROCEDURE — 80048 BASIC METABOLIC PNL TOTAL CA: CPT

## 2025-04-18 PROCEDURE — 84450 TRANSFERASE (AST) (SGOT): CPT

## 2025-04-18 PROCEDURE — 84439 ASSAY OF FREE THYROXINE: CPT

## 2025-04-18 PROCEDURE — 82570 ASSAY OF URINE CREATININE: CPT

## 2025-04-18 PROCEDURE — 82728 ASSAY OF FERRITIN: CPT

## 2025-04-18 PROCEDURE — 83036 HEMOGLOBIN GLYCOSYLATED A1C: CPT

## 2025-04-18 PROCEDURE — 82043 UR ALBUMIN QUANTITATIVE: CPT

## 2025-04-18 PROCEDURE — 36415 COLL VENOUS BLD VENIPUNCTURE: CPT

## 2025-04-20 LAB — DEPRECATED HBV CORE AB SER IA-ACNC: 113 NG/ML (ref 50–336)

## 2025-05-02 ENCOUNTER — TELEPHONE (OUTPATIENT)
Dept: INTERNAL MEDICINE CLINIC | Facility: CLINIC | Age: OVER 89
End: 2025-05-02

## 2025-05-02 NOTE — TELEPHONE ENCOUNTER
Request for physicians order received requesting KS  signature     Placed in [place] IN-BASKET to review

## 2025-05-05 NOTE — TELEPHONE ENCOUNTER
Faxed and confirmation received. Original placed in [place] Accordion on This MA's desk, copy sent to scan.

## 2025-05-17 DIAGNOSIS — M48.062 SPINAL STENOSIS OF LUMBAR REGION WITH NEUROGENIC CLAUDICATION: ICD-10-CM

## 2025-05-17 DIAGNOSIS — E78.2 MIXED HYPERLIPIDEMIA: ICD-10-CM

## 2025-05-17 DIAGNOSIS — I48.91 ATRIAL FIBRILLATION, UNSPECIFIED TYPE (HCC): ICD-10-CM

## 2025-05-17 DIAGNOSIS — E03.9 HYPOTHYROIDISM, UNSPECIFIED TYPE: ICD-10-CM

## 2025-05-17 DIAGNOSIS — R26.89 NEED FOR ASSISTANCE DUE TO UNSTEADY GAIT: ICD-10-CM

## 2025-05-17 DIAGNOSIS — E11.9 CONTROLLED TYPE 2 DIABETES MELLITUS WITHOUT COMPLICATION, WITHOUT LONG-TERM CURRENT USE OF INSULIN (HCC): ICD-10-CM

## 2025-05-17 DIAGNOSIS — Z00.00 ROUTINE GENERAL MEDICAL EXAMINATION AT A HEALTH CARE FACILITY: ICD-10-CM

## 2025-05-17 DIAGNOSIS — I10 ESSENTIAL HYPERTENSION: ICD-10-CM

## 2025-05-19 ENCOUNTER — OFFICE VISIT (OUTPATIENT)
Dept: INTERNAL MEDICINE CLINIC | Facility: CLINIC | Age: OVER 89
End: 2025-05-19
Payer: MEDICARE

## 2025-05-19 ENCOUNTER — LAB ENCOUNTER (OUTPATIENT)
Dept: LAB | Age: OVER 89
End: 2025-05-19
Attending: INTERNAL MEDICINE
Payer: MEDICARE

## 2025-05-19 ENCOUNTER — TELEPHONE (OUTPATIENT)
Dept: INTERNAL MEDICINE CLINIC | Facility: CLINIC | Age: OVER 89
End: 2025-05-19

## 2025-05-19 VITALS
BODY MASS INDEX: 31.91 KG/M2 | SYSTOLIC BLOOD PRESSURE: 100 MMHG | HEART RATE: 79 BPM | TEMPERATURE: 98 F | DIASTOLIC BLOOD PRESSURE: 50 MMHG | HEIGHT: 68.5 IN | OXYGEN SATURATION: 95 % | WEIGHT: 213 LBS

## 2025-05-19 DIAGNOSIS — N18.30 CONTROLLED TYPE 2 DIABETES MELLITUS WITH STAGE 3 CHRONIC KIDNEY DISEASE, WITHOUT LONG-TERM CURRENT USE OF INSULIN (HCC): ICD-10-CM

## 2025-05-19 DIAGNOSIS — E11.22 CONTROLLED TYPE 2 DIABETES MELLITUS WITH STAGE 3 CHRONIC KIDNEY DISEASE, WITHOUT LONG-TERM CURRENT USE OF INSULIN (HCC): ICD-10-CM

## 2025-05-19 DIAGNOSIS — E78.5 HYPERLIPIDEMIA DUE TO TYPE 2 DIABETES MELLITUS (HCC): ICD-10-CM

## 2025-05-19 DIAGNOSIS — E11.59 HYPERTENSION ASSOCIATED WITH DIABETES (HCC): Primary | ICD-10-CM

## 2025-05-19 DIAGNOSIS — E66.9 TYPE 2 DIABETES MELLITUS WITH OBESITY (HCC): ICD-10-CM

## 2025-05-19 DIAGNOSIS — I48.0 PAROXYSMAL ATRIAL FIBRILLATION (HCC): ICD-10-CM

## 2025-05-19 DIAGNOSIS — I15.2 HYPERTENSION ASSOCIATED WITH DIABETES (HCC): Primary | ICD-10-CM

## 2025-05-19 DIAGNOSIS — D64.9 NORMOCYTIC ANEMIA: ICD-10-CM

## 2025-05-19 DIAGNOSIS — E11.69 HYPERLIPIDEMIA DUE TO TYPE 2 DIABETES MELLITUS (HCC): ICD-10-CM

## 2025-05-19 DIAGNOSIS — E11.69 TYPE 2 DIABETES MELLITUS WITH OBESITY (HCC): ICD-10-CM

## 2025-05-19 PROBLEM — J44.9 CHRONIC OBSTRUCTIVE PULMONARY DISEASE (HCC): Status: ACTIVE | Noted: 2025-05-12

## 2025-05-19 LAB
FOLATE SERPL-MCNC: >48 NG/ML (ref 5.4–?)
IRON SATN MFR SERPL: 21 % (ref 20–50)
IRON SERPL-MCNC: 69 UG/DL (ref 65–175)
TOTAL IRON BINDING CAPACITY: 322 UG/DL (ref 250–425)
TRANSFERRIN SERPL-MCNC: 251 MG/DL (ref 215–365)
VIT B12 SERPL-MCNC: 574 PG/ML (ref 211–911)

## 2025-05-19 PROCEDURE — 83550 IRON BINDING TEST: CPT

## 2025-05-19 PROCEDURE — 82746 ASSAY OF FOLIC ACID SERUM: CPT

## 2025-05-19 PROCEDURE — 83540 ASSAY OF IRON: CPT

## 2025-05-19 PROCEDURE — 36415 COLL VENOUS BLD VENIPUNCTURE: CPT

## 2025-05-19 PROCEDURE — 99215 OFFICE O/P EST HI 40 MIN: CPT | Performed by: INTERNAL MEDICINE

## 2025-05-19 PROCEDURE — G2211 COMPLEX E/M VISIT ADD ON: HCPCS | Performed by: INTERNAL MEDICINE

## 2025-05-19 PROCEDURE — 82607 VITAMIN B-12: CPT

## 2025-05-19 RX ORDER — LEVOTHYROXINE SODIUM 88 UG/1
88 TABLET ORAL
Qty: 90 TABLET | Refills: 3 | Status: SHIPPED | OUTPATIENT
Start: 2025-05-19

## 2025-05-19 RX ORDER — ALBUTEROL SULFATE 90 UG/1
1 INHALANT RESPIRATORY (INHALATION) EVERY 6 HOURS PRN
Qty: 18 G | Refills: 1 | Status: SHIPPED | OUTPATIENT
Start: 2025-05-19

## 2025-05-19 RX ORDER — PRAVASTATIN SODIUM 40 MG
40 TABLET ORAL DAILY
COMMUNITY
Start: 2025-05-14 | End: 2025-05-19

## 2025-05-19 NOTE — PATIENT INSTRUCTIONS
Please reduce your losartan to 50 mg every day and please update me on whether your machine is accurate or not.     You will be due for a full panel of labs in October, 2025. You do not need to fast or give a urine sample.

## 2025-05-19 NOTE — TELEPHONE ENCOUNTER
Protocol passed    Requesting levothyroxine 88 MCG Oral Tab   LOV: 12/10/24  RTC: 05/10/25  Last Relevant Labs: 4/18/25  Filled: 11/19/24 #90 with 1 refills    Future Appointments   Date Time Provider Department Center   5/19/2025  2:00 PM rAleen Layton MD [facility] [place]

## 2025-05-19 NOTE — PROGRESS NOTES
HPI:   Patient presents for the following issues. Comes in with wife.   DM eye exam - he follows with retinal specialist every 8 weeks.   DM - he brings in his log of weekly FBS. They are 150-160s. No hypoglycemia.   HTN - home pressures are less than 130/80s.   HENAO - has episodes of shortness of breast a couple times per week. Usually he can get through them by deep breaths. He has been using Incruse for COPD but it is not really helping. He had a bad episode a few nights ago that was severe. That is the first and only time it lasted all night and he thought about callign EMS. He will also see cardiology in July. He has also been noticing more dyspnea with going back/forth to mailbox and is more frequent.   Leg swelling - worsening in both legs. He wears compression stockings during the daytime which alleviates his swelling. His swelling is worse in mornings. Not painful.   KRISTIAN - needs a one hour nap every afternoon and he feels better after it. Does not wear cpap during daytime nap. He wears it 4-5 hours at night only. He does feel good when he wakes up in the morning.   Activity - riding bike 15 minutes every day.         REVIEW OF SYSTEMS:   GENERAL HEALTH: feels well otherwise. No f/c  NEURO: denies any headaches, LH, dizzyness, LOC, falls  VISION: denies any blurred or double vision  RESPIRATORY: has a dry cough, denies wheezing. Dyspnea as above   CARDIOVASCULAR: denies chest pain, pressure or palpitations  GI: denies abdominal pain, constipation, diarrhea, n/v, BRBPR, melena  : no dysuria or hematuria   PSYCH: mood is stable. Denies depression or anxiety. He does feel down at times because he can't do things as fast as he could.   EXT: see HPI    Wt Readings from Last 6 Encounters:   05/19/25 213 lb (96.6 kg)   12/10/24 211 lb (95.7 kg)   11/06/23 202 lb 3.2 oz (91.7 kg)   05/05/23 201 lb (91.2 kg)   05/02/23 201 lb 6.4 oz (91.4 kg)   04/14/23 213 lb (96.6 kg)       Allergies   Allergen Reactions     Hydralazine OTHER (SEE COMMENTS)     Constipation    Lovastatin MYALGIA    Metformin DIARRHEA       Family History   Problem Relation Age of Onset    Cancer Father     Dementia Father     Cancer Mother     Stroke Brother     Heart Disease Neg     Stroke Neg       Past Medical History:    Anxiety    Arrhythmia    HX AFIB DX 3 YRS AGO    Arthritis    Atrial fibrillation (HCC)    Back problem    middle cage in back     BPH (benign prostatic hyperplasia)    Cataract    Esophageal reflux    HIGH BLOOD PRESSURE    HIGH CHOLESTEROL    Hyperlipidemia    Hypothyroid    Obesity    Obstructive sleep apnea (adult) (pediatric)    AHI 13 SaO2 conrad 78%     Sleep apnea    Type II or unspecified type diabetes mellitus without mention of complication, not stated as uncontrolled    Unspecified disorder of thyroid    Unspecified essential hypertension    Visual impairment    glasses      Past Surgical History:   Procedure Laterality Date    Back surgery  20004    l3-l4    Back surgery  8/28/14    L3-L5 PPS TLIF     Cataract      Cholecystectomy      \"prior to 1994\"    Colonoscopy  2009    Colonoscopy  3/21/2014    Procedure: COLONOSCOPY;  Surgeon: Kain Brewer MD;  Location:  ENDOSCOPY    Dil urethra stric,male,gen anesth  8/28/14    EDW, JESUSITA    Drain/inject large joint/bursa N/A 4/27/2015    Procedure: LUMBAR EPIDURAL;  Surgeon: Husam Robb MD;  Location: [facility]    Drain/inject large joint/bursa Right 7/7/2015    Procedure: SI JOINT INJECTION;  Surgeon: Husam Robb MD;  Location: [facility]    Echo guide needle biopsy Right 4/7/2015    Procedure: PIRIFORMIS/SCIATIC NERVE INJECTION;  Surgeon: Pancho Lawson MD;  Location: [facility]    Eye surgery      Fluor gid & loclzj ndl/cath spi dx/ther njx N/A 4/27/2015    Procedure: LUMBAR EPIDURAL;  Surgeon: Husam Robb MD;  Location: [facility]    Fluor gid & loclzj ndl/cath spi dx/ther  njx N/A 4/7/2015    Procedure: LUMBAR EPIDURAL;  Surgeon: Pancho Lawson MD;  [facility]    Fluoroscopic guidance needle placement N/A 4/27/2015    Procedure: LUMBAR EPIDURAL;  Surgeon: Husam Robb MD;  Location: [facility]    Hip replacement surgery  8/1/2005, 8/15/2008    L in 2005, R in 2008    Inj for sacroiliac jt anesth Right 5/19/2015    Procedure: SI JOINT INJECTION;  Surgeon: Husam Robb MD;  Location: [facility]    Inj for sacroiliac jt anesth Left 6/9/2015    Procedure: SI JOINT INJECTION;  Surgeon: Husam Robb MD;  Location: [facility]    Inj for sacroiliac jt anesth Right 7/7/2015    Procedure: SI JOINT INJECTION;  Surgeon: Husam Robb MD;  Location: [facility]    Injection, w/wo contrast, dx/therapeutic substance, epidural/subarachnoid; lumbar/sacral N/A 4/27/2015    Procedure: LUMBAR EPIDURAL;  Surgeon: Husam Robb MD;  Location: [facility]    Injection, w/wo contrast, dx/therapeutic substance, epidural/subarachnoid; lumbar/sacral N/A 4/7/2015    Procedure: LUMBAR EPIDURAL;  Surgeon: Pancho Lawson MD;  Location: [facility]    Injection; single/multiple trigger point(s), 1/2 muscle Right 4/7/2015    Procedure: PIRIFORMIS/SCIATIC NERVE INJECTION;  Surgeon: Pancho Lawson MD;  Location: [facility]    Injection; single/multiple trigger point(s), 1/2 muscle Right 8/24/2015    Procedure: PIRIFORMIS/SCIATIC NERVE INJECTION;  Surgeon: Husam Robb MD;  Location: [facility]    Laminectomy,lumbar  10/2003    L3-L4    Other surgical history  4/2013    right lumbar facet steroid injection     Other surgical history  8/28/2014    L3-5 post LIF    Patient documented not to have experienced any of the following events N/A 4/27/2015    Procedure: LUMBAR EPIDURAL;  Surgeon: Husam Robb  MD;  Location: [facility]    Patient documented not to have experienced any of the following events  5/19/2015    Procedure: ;  Surgeon: Husam Robb MD;  Location: [facility]    Patient documented not to have experienced any of the following events Left 6/9/2015    Procedure: SI JOINT INJECTION;  Surgeon: Husam Robb MD;  Location: [facility]    Patient documented not to have experienced any of the following events Right 7/7/2015    Procedure: SI JOINT INJECTION;  Surgeon: Husam Robb MD;  Location: [facility]    Patient documented not to have experienced any of the following events Right 4/7/2015    Procedure: PIRIFORMIS/SCIATIC NERVE INJECTION;  Surgeon: Pancho Lawson MD;  Location: [facility]    Patient documented not to have experienced any of the following events Right 8/24/2015    Procedure: PIRIFORMIS/SCIATIC NERVE INJECTION;  Surgeon: Husam Robb MD;  Location: [facility]    Patient withough preoperative order for iv antibiotic surgical site infection prophylaxis. N/A 4/27/2015    Procedure: LUMBAR EPIDURAL;  Surgeon: Husam Robb MD;  Location: [facility]    Patient withough preoperative order for iv antibiotic surgical site infection prophylaxis.  5/19/2015    Procedure: ;  Surgeon: Husam Robb MD;  Location: [facility]    Patient withough preoperative order for iv antibiotic surgical site infection prophylaxis. Left 6/9/2015    Procedure: SI JOINT INJECTION;  Surgeon: Husam Robb MD;  Location: [facility]    Patient withough preoperative order for iv antibiotic surgical site infection prophylaxis. Right 7/7/2015    Procedure: SI JOINT INJECTION;  Surgeon: Husam Robb MD;  Location: [facility]    Patient withough preoperative order for iv antibiotic surgical  site infection prophylaxis. Right 2015    Procedure: PIRIFORMIS/SCIATIC NERVE INJECTION;  Surgeon: Pancho Lawson MD;  Location: [facility]    Patient withough preoperative order for iv antibiotic surgical site infection prophylaxis. Right 2015    Procedure: PIRIFORMIS/SCIATIC NERVE INJECTION;  Surgeon: Husam Robb MD;  Location: [facility]    Spine surgery procedure unlisted      Tonsillectomy      Total hip replacement      Vasectomy   ?      Social History:    Social History     Socioeconomic History    Marital status:    Tobacco Use    Smoking status: Former     Current packs/day: 0.00     Average packs/day: 2.0 packs/day for 45.0 years (90.0 ttl pk-yrs)     Types: Cigarettes     Start date: 1935     Quit date: 1980     Years since quittin.8    Smokeless tobacco: Never   Vaping Use    Vaping status: Never Used   Substance and Sexual Activity    Alcohol use: Yes     Alcohol/week: 2.0 standard drinks of alcohol     Types: 2 Glasses of wine per week    Drug use: Never   Other Topics Concern    Caffeine Concern No    Stress Concern No    Weight Concern No    Special Diet No    Exercise Yes    Seat Belt Yes     Social Drivers of Health      Received from [facility]    Housing Stability           EXAM:   /50 (BP Location: Right arm)   Pulse 79   Temp 98 °F (36.7 °C) (Temporal)   Ht 5' 8.5\" (1.74 m)   Wt 213 lb (96.6 kg)   SpO2 95%   BMI 31.92 kg/m²   GENERAL: A&O well developed, well nourished,in no apparent distress  SKIN: no rashes,no suspicious lesions  HEENT: atraumatic, MMM, throat is clear  NECK: supple, no jvd, no thyromegaly  LUNGS: clear to auscultation bilateraly, no c/w/r  CARDIO: RRR without g/m/r  GI: soft non tender nondistended no hsm bs throughout  NEURO: CN 2-12 grossly intact  PSYCH: pleasant  EXTREMITIES: no cyanosis, clubbing. Has forefoot swelling bilaterally. No swelling of lower  extrremeties above ankles bilaterally.   Bilateral barefoot skin diabetic exam is normal, visualized feet and the appearance is normal.  Bilateral monofilament/sensation of both feet is normal.  Pulsation pedal pulse exam of both lower legs/feet is normal as well.    ASSESSMENT AND PLAN:   # HTN assoc with DM: blood pressure in office is low but he denies any LH or dizziness. His home machine is showing pressures closer to  and it matches with the fire dept but they also use an automatic cuff.  He is going to continue his current medication regimen and return to office with his machine.   - hydralazine (swelling) and norvasc (swelling)  # COPD - he has been in Incruse inhaler since 3/2025 with no difference. He will complete a 3 month supply and then decide whether to continue it.  # Dyspnea - episodes of dyspnea are very vague at times but he is noticing more HENAO and will see cardiology in June.   # Uretrhal Stricture, BPH: stable, cont flomax 0.8 mg and cont finasteride 5 mg. rapaflo was too costly.   # Afib:  Rate controlled. cont xarelto   # KIRSTIAN: using CPAP nightly but does not sound like his sleep quality is good, needs to re-establish with sleep medicine.   # Venous insufficiency of both lower extremities: continue compression stockings and elevation.   # Gait Instability: 2/2 spinal stenosis.   Cont to reinforce fall prevention and HEP.   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  - gabapentin was not effective.   - 2 cortisone injections and 5 trigger point injections did not help. Has undergone chiropracter and PT 12 weeks. Meloxicam, celebrex did not help.  # Hypothyroidism: normal TSH in 4/2025.  Cont levothyroxine.   # Diabetic Neuropathy in right foot: gabapentin did not help. Well controlled with topical lotion  # CKDz stage 3 in DM - chronic, stable  #  Type 2 DM: at goal in 4/2025. Cont trulicity 1.5 mg  weekly and glipizide 7.5 mg daily.   - metformin (diarrhea)  - cont to reinforce lifestyle interventions.   - DM eye exam - follows with Dr. Tucker every 8 weeks. Exam done on 3/21/2025 without retinopathy in either eye.   - Foot Exam: done 2025   - LDL: see below  - BP: see below  - micro alb:creat done 2025   - Depression Screen: done 2025  - Not on ASA b/c on coumadin  # Macular Degeneration of Right Eye: on injections through Edgar Tucker.   # HLP assoc with DM: LDL is 80 on pravastatin 40mg. Cardiology does not recommend increasing statin dose to achieve LDL less than 70  # GERD: ranitidine was not effective. Needs daily omeprazole for quality of life.   # Health Maintenance: medicare wellness exam 12/2024  Colon Cancer Screening: colonoscopy in 3/2014 with hyperplastic and adenomatous polyps. He declines repeating a colonoscopy. FIT testing negative in 2018.   Bone Health: continue dietary calcium/vitD3, fall prevention and weight bearing exercises   Vaccines: up to date with shingrix and pneumonia series. Cont COVID, flu, RSV. Advised on TDAP  Primary is wife. Second is Daughter, Emma Roman is medical POA. Next would be son, Neel.  Full Code, but no prolonged life support.     Care Team:  Dr. Varun Cole (cardiology)  Dr. Gallego (podiatry)        The patient indicates understanding of these issues and agrees to the plan.  The patient is asked to return in 1 week for blood pressure evaluation.   Arleen Layton MD

## 2025-05-20 ENCOUNTER — TELEPHONE (OUTPATIENT)
Dept: INTERNAL MEDICINE CLINIC | Facility: CLINIC | Age: OVER 89
End: 2025-05-20

## 2025-05-20 ENCOUNTER — PATIENT MESSAGE (OUTPATIENT)
Dept: INTERNAL MEDICINE CLINIC | Facility: CLINIC | Age: OVER 89
End: 2025-05-20

## 2025-05-20 NOTE — TELEPHONE ENCOUNTER
KS: Please see MCM and advise on BP and losartan dosing. TY    LOV 5/19:  Please reduce your losartan to 50 mg every day and please update me on whether your machine is accurate or not.

## 2025-05-21 ENCOUNTER — TELEPHONE (OUTPATIENT)
Dept: INTERNAL MEDICINE CLINIC | Facility: CLINIC | Age: OVER 89
End: 2025-05-21

## 2025-05-22 NOTE — TELEPHONE ENCOUNTER
Spoke with pt and pt agreed to come in.   Future Appointments   Date Time Provider Department Center   5/27/2025 12:00 PM Arleen Layton MD [facility] [place]

## 2025-05-23 DIAGNOSIS — E11.69 TYPE 2 DIABETES MELLITUS WITH OBESITY (HCC): ICD-10-CM

## 2025-05-23 DIAGNOSIS — E66.9 TYPE 2 DIABETES MELLITUS WITH OBESITY (HCC): ICD-10-CM

## 2025-05-23 RX ORDER — GLIPIZIDE 5 MG/1
7.5 TABLET ORAL
Qty: 135 TABLET | Refills: 3 | Status: SHIPPED | OUTPATIENT
Start: 2025-05-23

## 2025-05-27 ENCOUNTER — OFFICE VISIT (OUTPATIENT)
Dept: INTERNAL MEDICINE CLINIC | Facility: CLINIC | Age: OVER 89
End: 2025-05-27
Payer: MEDICARE

## 2025-05-27 VITALS
WEIGHT: 212.81 LBS | TEMPERATURE: 98 F | RESPIRATION RATE: 16 BRPM | OXYGEN SATURATION: 98 % | SYSTOLIC BLOOD PRESSURE: 92 MMHG | BODY MASS INDEX: 31.88 KG/M2 | DIASTOLIC BLOOD PRESSURE: 42 MMHG | HEART RATE: 68 BPM | HEIGHT: 68.5 IN

## 2025-05-27 DIAGNOSIS — I95.9 HYPOTENSION, UNSPECIFIED HYPOTENSION TYPE: Primary | ICD-10-CM

## 2025-05-27 DIAGNOSIS — I15.2 HYPERTENSION ASSOCIATED WITH DIABETES (HCC): ICD-10-CM

## 2025-05-27 DIAGNOSIS — J43.9 PULMONARY EMPHYSEMA, UNSPECIFIED EMPHYSEMA TYPE (HCC): ICD-10-CM

## 2025-05-27 DIAGNOSIS — E11.69 TYPE 2 DIABETES MELLITUS WITH OBESITY (HCC): ICD-10-CM

## 2025-05-27 DIAGNOSIS — E66.9 TYPE 2 DIABETES MELLITUS WITH OBESITY (HCC): ICD-10-CM

## 2025-05-27 DIAGNOSIS — E11.59 HYPERTENSION ASSOCIATED WITH DIABETES (HCC): ICD-10-CM

## 2025-05-27 DIAGNOSIS — E11.22 CONTROLLED TYPE 2 DIABETES MELLITUS WITH STAGE 3 CHRONIC KIDNEY DISEASE, WITHOUT LONG-TERM CURRENT USE OF INSULIN (HCC): ICD-10-CM

## 2025-05-27 DIAGNOSIS — N18.30 CONTROLLED TYPE 2 DIABETES MELLITUS WITH STAGE 3 CHRONIC KIDNEY DISEASE, WITHOUT LONG-TERM CURRENT USE OF INSULIN (HCC): ICD-10-CM

## 2025-05-27 PROCEDURE — G2211 COMPLEX E/M VISIT ADD ON: HCPCS | Performed by: INTERNAL MEDICINE

## 2025-05-27 PROCEDURE — 99214 OFFICE O/P EST MOD 30 MIN: CPT | Performed by: INTERNAL MEDICINE

## 2025-05-27 RX ORDER — LOSARTAN POTASSIUM 50 MG/1
50 TABLET ORAL DAILY
Qty: 90 TABLET | Refills: 3 | Status: SHIPPED | OUTPATIENT
Start: 2025-05-27

## 2025-05-27 NOTE — PROGRESS NOTES
HPI:   Patient presents for HTN. He brings in his home machine.   HTN - we reduced losartan to 50 mg one week ago. His home pressures since then are -145, DBP less than 70s. He feels no different. He denies any symptoms of hypotension  Episodes of HENAO - he has tried using albuterol prior to activity. He is not sure he is using it properly.   Foot swelling, bilaterally - stable.    REVIEW OF SYSTEMS:   GENERAL HEALTH: feels well otherwise. No f/c  NEURO: denies any headaches, LH, dizzyness, LOC, falls  VISION: denies any blurred or double vision  RESPIRATORY: denies shortness of breath, cough, or congestion  CARDIOVASCULAR: denies chest pain, pressure or palpitations  GI: denies abdominal pain, constipation, diarrhea, n/v, BRBPR, melena  : no dysuria, frequency, or hematuria  SKIN: denies any unusual skin lesions or rashes  PSYCH: mood is stable  EXT: denies edema     Wt Readings from Last 6 Encounters:   05/27/25 212 lb 12.8 oz (96.5 kg)   05/19/25 213 lb (96.6 kg)   12/10/24 211 lb (95.7 kg)   11/06/23 202 lb 3.2 oz (91.7 kg)   05/05/23 201 lb (91.2 kg)   05/02/23 201 lb 6.4 oz (91.4 kg)       Allergies   Allergen Reactions    Hydralazine OTHER (SEE COMMENTS)     Constipation    Lovastatin MYALGIA    Metformin DIARRHEA       Family History   Problem Relation Age of Onset    Cancer Father     Dementia Father     Cancer Mother     Stroke Brother     Heart Disease Neg     Stroke Neg       Past Medical History:    Anxiety    Arrhythmia    HX AFIB DX 3 YRS AGO    Arthritis    Atrial fibrillation (HCC)    Back problem    middle cage in back     BPH (benign prostatic hyperplasia)    Cataract    Esophageal reflux    HIGH BLOOD PRESSURE    HIGH CHOLESTEROL    Hyperlipidemia    Hypothyroid    Obesity    Obstructive sleep apnea (adult) (pediatric)    AHI 13 SaO2 conrad 78%     Sleep apnea    Type II or unspecified type diabetes mellitus without mention of complication, not stated as uncontrolled    Unspecified  disorder of thyroid    Unspecified essential hypertension    Visual impairment    glasses      Past Surgical History:   Procedure Laterality Date    Back surgery  20004    l3-l4    Back surgery  8/28/14    L3-L5 PPS TLIF     Cataract      Cholecystectomy      \"prior to 1994\"    Colonoscopy  2009    Colonoscopy  3/21/2014    Procedure: COLONOSCOPY;  Surgeon: Kain Brewer MD;  Location:  ENDOSCOPY    Dil urethra stric,male,gen anesth  8/28/14    EDW, JESUSITA    Drain/inject large joint/bursa N/A 4/27/2015    Procedure: LUMBAR EPIDURAL;  Surgeon: uHsam Robb MD;  Location: [facility]    Drain/inject large joint/bursa Right 7/7/2015    Procedure: SI JOINT INJECTION;  Surgeon: Husam Robb MD;  Location: [facility]    Echo guide needle biopsy Right 4/7/2015    Procedure: PIRIFORMIS/SCIATIC NERVE INJECTION;  Surgeon: Panhco Lawson MD;  Location: [facility]    Eye surgery      Fluor gid & loclzj ndl/cath spi dx/ther njx N/A 4/27/2015    Procedure: LUMBAR EPIDURAL;  Surgeon: Husam Robb MD;  Location: [facility]    Fluor gid & loclzj ndl/cath spi dx/ther njx N/A 4/7/2015    Procedure: LUMBAR EPIDURAL;  Surgeon: Pancho Lawson MD;  Location: [facility]    Fluoroscopic guidance needle placement N/A 4/27/2015    Procedure: LUMBAR EPIDURAL;  Surgeon: Husam Robb MD;  Location: [facility]    Hip replacement surgery  8/1/2005, 8/15/2008    L in 2005, R in 2008    Inj for sacroiliac jt anesth Right 5/19/2015    Procedure: SI JOINT INJECTION;  Surgeon: Husam Robb MD;  Location: [facility]    Inj for sacroiliac jt anesth Left 6/9/2015    Procedure: SI JOINT INJECTION;  Surgeon: Husam Robb MD;  Location: [facility]    Inj for sacroiliac jt anesth Right 7/7/2015    Procedure: SI JOINT INJECTION;  Surgeon: Husam Robb MD;  Location:  [facility]    Injection, w/wo contrast, dx/therapeutic substance, epidural/subarachnoid; lumbar/sacral N/A 4/27/2015    Procedure: LUMBAR EPIDURAL;  Surgeon: Husam Robb MD;  Location: [facility]    Injection, w/wo contrast, dx/therapeutic substance, epidural/subarachnoid; lumbar/sacral N/A 4/7/2015    Procedure: LUMBAR EPIDURAL;  Surgeon: Pancho Lawson MD;  Location: [facility]    Injection; single/multiple trigger point(s), 1/2 muscle Right 4/7/2015    Procedure: PIRIFORMIS/SCIATIC NERVE INJECTION;  Surgeon: Pancho Lawson MD;  Location: [facility]    Injection; single/multiple trigger point(s), 1/2 muscle Right 8/24/2015    Procedure: PIRIFORMIS/SCIATIC NERVE INJECTION;  Surgeon: Husam Robb MD;  Location: [facility]    Laminectomy,lumbar  10/2003    L3-L4    Other surgical history  4/2013    right lumbar facet steroid injection     Other surgical history  8/28/2014    L3-5 post LIF    Patient documented not to have experienced any of the following events N/A 4/27/2015    Procedure: LUMBAR EPIDURAL;  Surgeon: Husam Robb MD;  Location: [facility]    Patient documented not to have experienced any of the following events  5/19/2015    Procedure: ;  Surgeon: Husam Robb MD;  Location: [facility]    Patient documented not to have experienced any of the following events Left 6/9/2015    Procedure: SI JOINT INJECTION;  Surgeon: Husam Robb MD;  Location: [facility]    Patient documented not to have experienced any of the following events Right 7/7/2015    Procedure: SI JOINT INJECTION;  Surgeon: Husam Robb MD;  Location: [facility]    Patient documented not to have experienced any of the following events Right 4/7/2015    Procedure: PIRIFORMIS/SCIATIC NERVE INJECTION;  Surgeon: Pancho Lawson MD;   Location: [facility]    Patient documented not to have experienced any of the following events Right 8/24/2015    Procedure: PIRIFORMIS/SCIATIC NERVE INJECTION;  Surgeon: Husam Robb MD;  Location: [facility]    Patient withough preoperative order for iv antibiotic surgical site infection prophylaxis. N/A 4/27/2015    Procedure: LUMBAR EPIDURAL;  Surgeon: Husam Robb MD;  Location: [facility]    Patient withough preoperative order for iv antibiotic surgical site infection prophylaxis.  5/19/2015    Procedure: ;  Surgeon: Husam Robb MD;  Location: [facility]    Patient withough preoperative order for iv antibiotic surgical site infection prophylaxis. Left 6/9/2015    Procedure: SI JOINT INJECTION;  Surgeon: Husam Robb MD;  Location: [facility]    Patient withough preoperative order for iv antibiotic surgical site infection prophylaxis. Right 7/7/2015    Procedure: SI JOINT INJECTION;  Surgeon: Husam Robb MD;  Location: [facility]    Patient withough preoperative order for iv antibiotic surgical site infection prophylaxis. Right 4/7/2015    Procedure: PIRIFORMIS/SCIATIC NERVE INJECTION;  Surgeon: Pancho Lawson MD;  Location: [facility]    Patient withough preoperative order for iv antibiotic surgical site infection prophylaxis. Right 8/24/2015    Procedure: PIRIFORMIS/SCIATIC NERVE INJECTION;  Surgeon: Husam Robb MD;  Location: [facility]    Spine surgery procedure unlisted      Tonsillectomy      Total hip replacement      Vasectomy  1969 ?      Social History:    Social History     Socioeconomic History    Marital status:    Tobacco Use    Smoking status: Former     Current packs/day: 0.00     Average packs/day: 2.0 packs/day for 45.0 years (90.0 ttl pk-yrs)     Types: Cigarettes     Start date: 7/14/1935      Quit date: 1980     Years since quittin.8    Smokeless tobacco: Never   Vaping Use    Vaping status: Never Used   Substance and Sexual Activity    Alcohol use: Yes     Alcohol/week: 2.0 standard drinks of alcohol     Types: 2 Glasses of wine per week     Comment: 1 drink/month or less    Drug use: Never   Other Topics Concern    Caffeine Concern No    Stress Concern No    Weight Concern No    Special Diet No    Exercise Yes    Seat Belt Yes     Social Drivers of Health      Received from [facility]    Housing Stability             EXAM:   BP 92/42 (BP Location: Right arm)   Pulse 68   Temp 97.9 °F (36.6 °C) (Temporal)   Resp 16   Ht 5' 8.5\" (1.74 m)   Wt 212 lb 12.8 oz (96.5 kg)   SpO2 98%   BMI 31.89 kg/m²   GENERAL: A&O well developed, well nourished,in no apparent distress  SKIN: no rashes,no suspicious lesions  HEENT: atraumatic, MMM, throat is clear  NECK: supple, no jvd, no thyromegaly  LUNGS: clear to auscultation bilateraly, no c/w/r  CARDIO: RRR without g/m/r  GI: soft non tender nondistended no hsm bs throughout  NEURO: CN 2-12 grossly intact  PSYCH: pleasant  EXTREMITIES: no cyanosis, clubbing or edema      ASSESSMENT AND PLAN:   # Hypotension - see management below, I wonder if his fatigue is just age related or due to tightly controlled blood pressure.  # HTN assoc with DM - his pressures are very tightly controlled. We reduced losartan to 50 mg one week ago but pressures are still low. Stop hydrochlorothiazide. If pressures remain low, we will stop aldactone. His home machine is wrong. He is going to purchase a new one and communicate pressures to me q 14 days.   - hydralazine (swelling) and norvasc (swelling)  # COPD - he has been in Incruse inhaler since 3/2025 with no difference. He will complete a 3 month supply and then decide whether to continue it.  # Dyspnea - episodes of dyspnea are very vague at times but he is noticing more HENAO and will see cardiology in  June. He will start to use albuterol prior to activity.   # Uretrhal Stricture, BPH: stable, cont flomax 0.8 mg and cont finasteride 5 mg. rapaflo was too costly.   # Afib:  Rate controlled. cont xarelto   # KRISTIAN: using CPAP nightly but does not sound like his sleep quality is good, needs to re-establish with sleep medicine.   # Venous insufficiency of both lower extremities: continue compression stockings and elevation.   # Gait Instability: 2/2 spinal stenosis.   Cont to reinforce fall prevention and HEP.   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  - gabapentin was not effective.   - 2 cortisone injections and 5 trigger point injections did not help. Has undergone chiropracter and PT 12 weeks. Meloxicam, celebrex did not help.  # Hypothyroidism: normal TSH in 4/2025.  Cont levothyroxine.   # Diabetic Neuropathy in right foot: gabapentin did not help. Well controlled with topical lotion  # CKDz stage 3 in DM - chronic, stable  #  Type 2 DM: at goal in 4/2025. Cont trulicity 1.5 mg weekly and glipizide 7.5 mg daily.   - metformin (diarrhea)  - cont to reinforce lifestyle interventions.   - DM eye exam - follows with Dr. Tucker every 8 weeks. Exam done on 3/21/2025 without retinopathy in either eye.   - Foot Exam: done 2025   - LDL: see below  - BP: see below  - micro alb:creat done 2025   - Depression Screen: done 2025  - Not on ASA b/c on coumadin  # Macular Degeneration of Right Eye: on injections through Edgar Tucker.   # HLP assoc with DM: LDL is 80 on pravastatin 40mg. Cardiology does not recommend increasing statin dose to achieve LDL less than 70  # GERD: ranitidine was not effective. Needs daily omeprazole for quality of life.   # Health Maintenance: medicare wellness exam 12/2024  Colon Cancer Screening: colonoscopy in 3/2014 with hyperplastic and adenomatous polyps. He declines repeating a colonoscopy. FIT testing  negative in 2018.   Bone Health: continue dietary calcium/vitD3, fall prevention and weight bearing exercises   Vaccines: up to date with shingrix and pneumonia series. Cont COVID, flu, RSV. Advised on TDAP  Primary is wife. Second is Daughter, Emma Roman is medical POA. Next would be son, Neel.  Full Code, but no prolonged life support.     Care Team:  Dr. Varun Cole (cardiology)  Dr. Gallego (podiatry)        The patient indicates understanding of these issues and agrees to the plan.  The patient is asked to return in 12/2024 for medicare wellness exam  Arleen Layton MD

## 2025-05-27 NOTE — PATIENT INSTRUCTIONS
Please bring your machine into your next office visit to ensure it is accurate. I like the [employer] brand. Please keep the receipt.   Please measure your blood pressure and pulse daily and record these values. Please measure your blood pressure once daily after you have been resting for at least 5 minutes. Both feet should be flat on the ground and you should be seated with your back supported. Please communicate your blood pressures to me in 14 days.     For your high blood pressure please STOP hydrochlorothiazide. Continue losartan 50 mg every evening and spironolactone 25 mg every day.

## 2025-06-13 ENCOUNTER — LAB ENCOUNTER (OUTPATIENT)
Dept: LAB | Age: OVER 89
End: 2025-06-13
Attending: PHYSICIAN ASSISTANT
Payer: MEDICARE

## 2025-06-13 ENCOUNTER — HOSPITAL ENCOUNTER (OUTPATIENT)
Dept: CV DIAGNOSTICS | Facility: HOSPITAL | Age: OVER 89
Discharge: HOME | End: 2025-06-13
Attending: PHYSICIAN ASSISTANT
Payer: MEDICARE

## 2025-06-13 DIAGNOSIS — I48.0 PAROXYSMAL ATRIAL FIBRILLATION (HCC): ICD-10-CM

## 2025-06-13 DIAGNOSIS — R06.9 RESPIRATORY ABNORMALITY, UNSPECIFIED: ICD-10-CM

## 2025-06-13 DIAGNOSIS — I73.9 PERIPHERAL VASCULAR DISEASE, UNSPECIFIED: Primary | ICD-10-CM

## 2025-06-13 DIAGNOSIS — J44.9 VANISHING LUNG (HCC): ICD-10-CM

## 2025-06-13 DIAGNOSIS — R06.09 DOE (DYSPNEA ON EXERTION): ICD-10-CM

## 2025-06-13 DIAGNOSIS — R06.09 DYSPNEA ON EXERTION: ICD-10-CM

## 2025-06-13 LAB
ANION GAP SERPL CALC-SCNC: 14 MMOL/L (ref 0–18)
BUN BLD-MCNC: 24 MG/DL (ref 9–23)
CALCIUM BLD-MCNC: 9.7 MG/DL (ref 8.7–10.6)
CHLORIDE SERPL-SCNC: 103 MMOL/L (ref 98–112)
CO2 SERPL-SCNC: 24 MMOL/L (ref 21–32)
CREAT BLD-MCNC: 1.44 MG/DL (ref 0.7–1.3)
EGFRCR SERPLBLD CKD-EPI 2021: 46 ML/MIN/1.73M2 (ref 60–?)
FASTING STATUS PATIENT QL REPORTED: NO
GLUCOSE BLD-MCNC: 181 MG/DL (ref 70–99)
NT-PROBNP SERPL-MCNC: 1884 PG/ML (ref ?–450)
OSMOLALITY SERPL CALC.SUM OF ELEC: 301 MOSM/KG (ref 275–295)
POTASSIUM SERPL-SCNC: 4.2 MMOL/L (ref 3.5–5.1)
SODIUM SERPL-SCNC: 141 MMOL/L (ref 136–145)

## 2025-06-13 PROCEDURE — 93306 TTE W/DOPPLER COMPLETE: CPT | Performed by: PHYSICIAN ASSISTANT

## 2025-06-13 PROCEDURE — 80048 BASIC METABOLIC PNL TOTAL CA: CPT

## 2025-06-13 PROCEDURE — 83880 ASSAY OF NATRIURETIC PEPTIDE: CPT

## 2025-06-13 PROCEDURE — 36415 COLL VENOUS BLD VENIPUNCTURE: CPT

## 2025-07-11 ENCOUNTER — HOSPITAL ENCOUNTER (OUTPATIENT)
Dept: ULTRASOUND IMAGING | Facility: HOSPITAL | Age: OVER 89
Discharge: HOME | End: 2025-07-11
Attending: PHYSICIAN ASSISTANT
Payer: MEDICARE

## 2025-07-11 DIAGNOSIS — R60.9 EDEMA: ICD-10-CM

## 2025-07-11 DIAGNOSIS — I73.9 PAD (PERIPHERAL ARTERY DISEASE): ICD-10-CM

## 2025-07-11 PROCEDURE — 93922 UPR/L XTREMITY ART 2 LEVELS: CPT | Performed by: PHYSICIAN ASSISTANT

## 2025-07-11 PROCEDURE — 93925 LOWER EXTREMITY STUDY: CPT | Performed by: PHYSICIAN ASSISTANT

## 2025-08-05 RX ORDER — DOCUSATE SODIUM 100 MG/1
100 CAPSULE, LIQUID FILLED ORAL 2 TIMES DAILY
Qty: 180 CAPSULE | Refills: 3 | Status: SHIPPED | OUTPATIENT
Start: 2025-08-05

## 2025-08-14 ENCOUNTER — TELEPHONE (OUTPATIENT)
Dept: INTERNAL MEDICINE CLINIC | Facility: CLINIC | Age: OVER 89
End: 2025-08-14

## 2025-08-18 DIAGNOSIS — I48.91 ATRIAL FIBRILLATION, UNSPECIFIED TYPE (HCC): ICD-10-CM

## 2025-08-18 DIAGNOSIS — E11.69 HYPERLIPIDEMIA DUE TO TYPE 2 DIABETES MELLITUS (HCC): ICD-10-CM

## 2025-08-18 DIAGNOSIS — E11.59 HYPERTENSION ASSOCIATED WITH DIABETES (HCC): ICD-10-CM

## 2025-08-18 DIAGNOSIS — E78.5 HYPERLIPIDEMIA DUE TO TYPE 2 DIABETES MELLITUS (HCC): ICD-10-CM

## 2025-08-18 DIAGNOSIS — I15.2 HYPERTENSION ASSOCIATED WITH DIABETES (HCC): ICD-10-CM

## 2025-08-18 DIAGNOSIS — E11.42 CONTROLLED TYPE 2 DIABETES MELLITUS WITH DIABETIC POLYNEUROPATHY, WITHOUT LONG-TERM CURRENT USE OF INSULIN (HCC): ICD-10-CM

## 2025-08-18 DIAGNOSIS — E78.2 MIXED HYPERLIPIDEMIA: ICD-10-CM

## 2025-08-18 RX ORDER — PRAVASTATIN SODIUM 40 MG
40 TABLET ORAL NIGHTLY
Qty: 90 TABLET | Refills: 3 | Status: SHIPPED | OUTPATIENT
Start: 2025-08-18

## 2025-08-25 ENCOUNTER — TELEMEDICINE (OUTPATIENT)
Dept: INTERNAL MEDICINE CLINIC | Facility: CLINIC | Age: OVER 89
End: 2025-08-25

## 2025-08-25 ENCOUNTER — TELEPHONE (OUTPATIENT)
Dept: INTERNAL MEDICINE CLINIC | Facility: CLINIC | Age: OVER 89
End: 2025-08-25

## 2025-08-25 DIAGNOSIS — K59.03 DRUG INDUCED CONSTIPATION: Primary | ICD-10-CM

## 2025-08-25 DIAGNOSIS — E11.69 TYPE 2 DIABETES MELLITUS WITH OBESITY (HCC): ICD-10-CM

## 2025-08-25 DIAGNOSIS — E11.59 HYPERTENSION ASSOCIATED WITH DIABETES (HCC): ICD-10-CM

## 2025-08-25 DIAGNOSIS — I15.2 HYPERTENSION ASSOCIATED WITH DIABETES (HCC): ICD-10-CM

## 2025-08-25 DIAGNOSIS — E11.69 HYPERLIPIDEMIA DUE TO TYPE 2 DIABETES MELLITUS (HCC): ICD-10-CM

## 2025-08-25 DIAGNOSIS — E78.5 HYPERLIPIDEMIA DUE TO TYPE 2 DIABETES MELLITUS (HCC): ICD-10-CM

## 2025-08-25 DIAGNOSIS — E66.9 TYPE 2 DIABETES MELLITUS WITH OBESITY (HCC): ICD-10-CM

## 2025-08-25 RX ORDER — FUROSEMIDE 20 MG/1
20 TABLET ORAL DAILY
COMMUNITY
Start: 2025-06-11

## 2025-08-29 ENCOUNTER — LAB ENCOUNTER (OUTPATIENT)
Dept: LAB | Age: OVER 89
End: 2025-08-29
Attending: INTERNAL MEDICINE

## 2025-08-29 DIAGNOSIS — E78.5 HYPERLIPIDEMIA DUE TO TYPE 2 DIABETES MELLITUS (HCC): ICD-10-CM

## 2025-08-29 DIAGNOSIS — E11.69 HYPERLIPIDEMIA DUE TO TYPE 2 DIABETES MELLITUS (HCC): ICD-10-CM

## 2025-08-29 DIAGNOSIS — E11.69 TYPE 2 DIABETES MELLITUS WITH OBESITY (HCC): ICD-10-CM

## 2025-08-29 DIAGNOSIS — I15.2 HYPERTENSION ASSOCIATED WITH DIABETES (HCC): ICD-10-CM

## 2025-08-29 DIAGNOSIS — E66.9 TYPE 2 DIABETES MELLITUS WITH OBESITY (HCC): ICD-10-CM

## 2025-08-29 DIAGNOSIS — E11.59 HYPERTENSION ASSOCIATED WITH DIABETES (HCC): ICD-10-CM

## 2025-08-29 LAB
ALT SERPL-CCNC: 34 U/L (ref 10–49)
AST SERPL-CCNC: 36 U/L (ref ?–34)
BASOPHILS # BLD AUTO: 0.05 X10(3) UL (ref 0–0.2)
BASOPHILS NFR BLD AUTO: 0.6 %
EOSINOPHIL # BLD AUTO: 0.09 X10(3) UL (ref 0–0.7)
EOSINOPHIL NFR BLD AUTO: 1 %
ERYTHROCYTE [DISTWIDTH] IN BLOOD BY AUTOMATED COUNT: 13.8 %
EST. AVERAGE GLUCOSE BLD GHB EST-MCNC: 169 MG/DL (ref 68–126)
HBA1C MFR BLD: 7.5 % (ref ?–5.7)
HCT VFR BLD AUTO: 33.1 % (ref 39–53)
HGB BLD-MCNC: 11.2 G/DL (ref 13–17.5)
IMM GRANULOCYTES # BLD AUTO: 0.05 X10(3) UL (ref 0–1)
IMM GRANULOCYTES NFR BLD: 0.6 %
LYMPHOCYTES # BLD AUTO: 0.92 X10(3) UL (ref 1–4)
LYMPHOCYTES NFR BLD AUTO: 10.6 %
MCH RBC QN AUTO: 30.9 PG (ref 26–34)
MCHC RBC AUTO-ENTMCNC: 33.8 G/DL (ref 31–37)
MCV RBC AUTO: 91.4 FL (ref 80–100)
MONOCYTES # BLD AUTO: 0.57 X10(3) UL (ref 0.1–1)
MONOCYTES NFR BLD AUTO: 6.6 %
NEUTROPHILS # BLD AUTO: 7.01 X10 (3) UL (ref 1.5–7.7)
NEUTROPHILS # BLD AUTO: 7.01 X10(3) UL (ref 1.5–7.7)
NEUTROPHILS NFR BLD AUTO: 80.6 %
PLATELET # BLD AUTO: 227 10(3)UL (ref 150–450)
RBC # BLD AUTO: 3.62 X10(6)UL (ref 3.8–5.8)
WBC # BLD AUTO: 8.7 X10(3) UL (ref 4–11)

## 2025-08-29 PROCEDURE — 83036 HEMOGLOBIN GLYCOSYLATED A1C: CPT

## 2025-08-29 PROCEDURE — 36415 COLL VENOUS BLD VENIPUNCTURE: CPT

## 2025-08-29 PROCEDURE — 85025 COMPLETE CBC W/AUTO DIFF WBC: CPT

## 2025-08-29 PROCEDURE — 84450 TRANSFERASE (AST) (SGOT): CPT

## 2025-08-29 PROCEDURE — 84460 ALANINE AMINO (ALT) (SGPT): CPT

## (undated) DIAGNOSIS — I48.91 ATRIAL FIBRILLATION, UNSPECIFIED TYPE (HCC): ICD-10-CM

## (undated) DIAGNOSIS — E11.9 CONTROLLED TYPE 2 DIABETES MELLITUS WITHOUT COMPLICATION, WITHOUT LONG-TERM CURRENT USE OF INSULIN (HCC): ICD-10-CM

## (undated) DIAGNOSIS — I10 ESSENTIAL HYPERTENSION: ICD-10-CM

## (undated) DIAGNOSIS — E78.2 MIXED HYPERLIPIDEMIA: ICD-10-CM

## (undated) DIAGNOSIS — Z00.00 ROUTINE GENERAL MEDICAL EXAMINATION AT A HEALTH CARE FACILITY: ICD-10-CM

## (undated) DIAGNOSIS — E11.9 DIABETES MELLITUS WITHOUT COMPLICATION (HCC): ICD-10-CM

## (undated) DIAGNOSIS — E03.9 HYPOTHYROIDISM, UNSPECIFIED TYPE: ICD-10-CM

## (undated) DIAGNOSIS — N40.1 BENIGN PROSTATIC HYPERPLASIA WITH INCOMPLETE BLADDER EMPTYING: ICD-10-CM

## (undated) DIAGNOSIS — M48.062 SPINAL STENOSIS OF LUMBAR REGION WITH NEUROGENIC CLAUDICATION: ICD-10-CM

## (undated) DIAGNOSIS — R26.89 NEED FOR ASSISTANCE DUE TO UNSTEADY GAIT: ICD-10-CM

## (undated) DIAGNOSIS — R39.14 BENIGN PROSTATIC HYPERPLASIA WITH INCOMPLETE BLADDER EMPTYING: ICD-10-CM

## (undated) NOTE — Clinical Note
Carlota Gaucher,  Mr. Roman's blood pressures have been borderline elevated for 3 months now. He is not really tolerating CPAP well and does not feel it has helped his energy levels.   Please let me know if you think we should add additional therapy though pa

## (undated) NOTE — Clinical Note
Devika Martinez has developed LE swelling on norvasc and he wants to change therapy. Please let me know if you are okay with change to diltiazem. Thanks,Arleen

## (undated) NOTE — MR AVS SNAPSHOT
[place]  [address] [address]  [address] [facility] [address] [phone]               Thank you for choosing us for your health care visit with Etta Matias MD.  We are glad to serve you and happy to provide you with this summ Recommended Websites for Advanced Directives    [employer].no. org/publications/Documents/personal_dec. pdf  An information packet, including necessary form from the [employer] website. http://www. [place]ph.state. [place]/public/books/advin ? Always use hand rails on stairs and escalators. ? Cover porch steps with gritty weather proof paint. ? Pay attention to curbs and other changes in surfaces when out in the community. ? Take care when walking on gravel or grassy surfaces. ?  Avoid walk Take 1 tablet (40 mg total) by mouth nightly. Commonly known as:  PRAVACHOL           PRESERVISION AREDS Tabs   Take  by mouth. Silodosin 8 MG Caps   Take 1 capsule (8 mg total) by mouth daily.    Commonly known as:  RAPAFLO           TraMADol H ? Make sure carpeting is secure. FLOORS:  ? Remove all loose wires, cords and throw rugs. ? Keep floors clear of clutter. ? Make sure carpets and area rugs have skid proof backing. ? Do not use slippery wax on bare floors. ?  Keep furniture in its accu

## (undated) NOTE — LETTER
11/03/21        PEDRO [facility]      Dear Radha Orozco,    Our records indicate that you have outstanding lab work and or testing that was ordered for you and has not yet been completed:  Orders Placed This Encounter

## (undated) NOTE — ED AVS SNAPSHOT
Emerita Zaldivarcal   MRN: [id number]    Department:  [facility] Emergency Department   Date of Visit:  3/28/2019           Disclosure     Insurance plans vary and the physician(s) referred by the ER may not be covered by your plan.  Please contact you tell this physician (or your personal doctor if your instructions are to return to your personal doctor) about any new or lasting problems. The primary care or specialist physician will see patients referred from the [facility] Emergency Department.  Arthor Bernheim

## (undated) NOTE — LETTER
02/18/19        PEDRO [facility] [address] [address]      Dear Ana Cristina Hernandez,    Our records indicate that you have outstanding lab work and or testing that was ordered for you and has not yet been completed:     To provide you with the bes

## (undated) NOTE — Clinical Note
Moody Stanton recently had an episode of RVR at THE [facility] on 8/12. He is not on rate controlling agents. His holter x 7 day was recently done. Today, his HR was 108 radially but he was asymptomatic. EKG showed rate of 86.      I didn't want to add BB

## (undated) NOTE — LETTER
08/30/19        PEDRO Roxanne Orville  [facility]      Dear Jocelyn Hall,    Our records indicate that you have outstanding lab work and or testing that was ordered for you and has not yet been completed: Non Fasting Lab Work   To complete lab work, please visit an [facility]. The [place] lab located in our building is open Monday through Friday from 7 am until 4:15 pm. You may schedule lab services online at [employer]/schedule-lab. They do accept walk-ins, however without a scheduled appointment there may be a longer wait. Some insurance plans have contracts with lab facilities that may be of lower cost to you, Eg. [facility] or [facility].   Please contact your insurance plan to find out which laboratory is in your network. If they prefer [facility], below is the information to the nearest location from our office:  [address] [place], [place] [address] [phone]  Monday-Friday 7:30 AM to 4:30 PM and Saturday 7:30 AM to 1:00 PM  They accept walk-ins, however there may be a longer wait. To view other locations or to schedule an appointment please visit [email or web address]. Dat make sure to contact our office prior to visiting a different lab facility to ensure orders are accessible by the facility. If you completed your lab work or testing outside of the [employer] please contact the facility and ask them to fax a copy of the results to our office. The fax number is [phone]. To provide you with the best possible care, please complete these orders at your earliest convenience. If you have recently completed these orders please disregard this letter. If you have any questions please call the office at Dept: [phone].      Thank you,       Generic Provider Rafita

## (undated) NOTE — LETTER
11/21/19    Dear Dr. Adan Núñez      Thank you for referring your patient, Nasima Key to me for an evaluation. Please see my initial consult note enclosed below. Let me know if you have any questions.     Thank you  Adiel Shaffer MD, Neurology  Ed palpitations, shortness of breath, rashes, joint pains, bowel / bladder incontinence or mood issues.      Past Medical History:   Diagnosis Date   • Arrhythmia     HX AFIB DX 3 YRS AGO   • Arthritis    • Atrial fibrillation (HCC)    • BPH (benign prostatic Performed by Adele Jarquin MD at [facility] MAIN OR   • SI JOINT INJECTION Right 7/7/2015    Performed by Kala Tomlin MD at [address] [facility]   • SI JOINT INJECTION Left 6/9/2015    Performed by Kala Tomlin MD at [address] [place] [address] • docusate sodium (DOCQLACE) 100 MG Oral Cap Take 1 capsule (100 mg total) by mouth 2 (two) times daily as needed for constipation.  180 capsule 3   • omeprazole 20 MG Oral Capsule Delayed Release Take 1 capsule (20 mg total) by mouth every morning before b visuospatial / executive: 4/5 (incorrect on \"trails\" test but able to correct with cues   Naming: 3/3  Attention: 6/6  Abstraction: 2/2  Language: 3/3 ( 14 words in 1 minute)  Delayed recall: 3 /5  Orientation: 6/6         Fundoscopic Exam: optic discs s 7 - 18 mg/dL 33 (H)   CREATININE      0.70 - 1.30 mg/dL 1.25   BUN/CREAT Ratio      10.0 - 20.0 26.4 (H)   CALCIUM      8.5 - 10.1 mg/dL 9.8   CALCULATED OSMOLALITY      275 - 295 mOsm/kg 294   eGFR NON-AFR.  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 Copy of note was sent to referring physician.       Jn Prado MD, Neurology

## (undated) NOTE — Clinical Note
Smita Bustillo recently had an episode of RVR at THE [facility] on 8/12. He is not on rate controlling agents. His holter x 7 day was recently done. Today, his HR was 108 radially but he was asymptomatic. EKG showed rate of 86.      I didn't want to add